# Patient Record
Sex: FEMALE | Race: WHITE | NOT HISPANIC OR LATINO | Employment: PART TIME
[De-identification: names, ages, dates, MRNs, and addresses within clinical notes are randomized per-mention and may not be internally consistent; named-entity substitution may affect disease eponyms.]

---

## 2018-03-23 ENCOUNTER — TRANSCRIBE ORDERS (OUTPATIENT)
Dept: SCHEDULING | Age: 68
End: 2018-03-23

## 2018-03-23 ENCOUNTER — TRANSCRIBE ORDERS (OUTPATIENT)
Dept: LAB | Age: 68
End: 2018-03-23

## 2018-03-23 ENCOUNTER — APPOINTMENT (OUTPATIENT)
Dept: LAB | Age: 68
End: 2018-03-23
Attending: INTERNAL MEDICINE
Payer: MEDICARE

## 2018-03-23 DIAGNOSIS — Z11.59 SCREENING EXAMINATION FOR POLIOMYELITIS: ICD-10-CM

## 2018-03-23 DIAGNOSIS — E78.00 PURE HYPERCHOLESTEROLEMIA: Primary | ICD-10-CM

## 2018-03-23 DIAGNOSIS — E78.00 PURE HYPERCHOLESTEROLEMIA: ICD-10-CM

## 2018-03-23 DIAGNOSIS — Z12.39 SCREENING BREAST EXAMINATION: Primary | ICD-10-CM

## 2018-03-23 LAB
CHOLEST SERPL-MCNC: 203 MG/DL
HDLC SERPL-MCNC: 55 MG/DL
HDLC SERPL: 3.7 {RATIO}
LDLC SERPL CALC-MCNC: 130 MG/DL
NONHDLC SERPL-MCNC: 148 MG/DL
TRIGL SERPL-MCNC: 91 MG/DL (ref 30–149)

## 2018-03-23 PROCEDURE — 36415 COLL VENOUS BLD VENIPUNCTURE: CPT

## 2018-03-23 PROCEDURE — 80061 LIPID PANEL: CPT

## 2018-03-23 PROCEDURE — 86803 HEPATITIS C AB TEST: CPT

## 2018-03-24 LAB — HCV AB SER QL: NONREACTIVE S/CO

## 2018-04-23 ENCOUNTER — HOSPITAL ENCOUNTER (OUTPATIENT)
Dept: RADIOLOGY | Age: 68
Discharge: HOME | End: 2018-04-23
Attending: OBSTETRICS & GYNECOLOGY
Payer: MEDICARE

## 2018-04-23 DIAGNOSIS — Z12.39 SCREENING BREAST EXAMINATION: ICD-10-CM

## 2018-04-23 PROCEDURE — 77063 BREAST TOMOSYNTHESIS BI: CPT

## 2018-05-09 ENCOUNTER — TRANSCRIBE ORDERS (OUTPATIENT)
Dept: SCHEDULING | Age: 68
End: 2018-05-09

## 2018-05-09 DIAGNOSIS — M85.9 DISORDER OF BONE DENSITY AND STRUCTURE, UNSPECIFIED: ICD-10-CM

## 2018-05-09 DIAGNOSIS — Z87.39 PERSONAL HISTORY OF ARTHRITIS: Primary | ICD-10-CM

## 2018-05-15 ENCOUNTER — HOSPITAL ENCOUNTER (OUTPATIENT)
Dept: RADIOLOGY | Age: 68
Discharge: HOME | End: 2018-05-15
Attending: OBSTETRICS & GYNECOLOGY
Payer: MEDICARE

## 2018-05-15 DIAGNOSIS — Z87.39 PERSONAL HISTORY OF ARTHRITIS: ICD-10-CM

## 2018-05-15 DIAGNOSIS — M85.9 DISORDER OF BONE DENSITY AND STRUCTURE, UNSPECIFIED: ICD-10-CM

## 2018-05-15 PROCEDURE — 77080 DXA BONE DENSITY AXIAL: CPT

## 2019-04-18 ENCOUNTER — TRANSCRIBE ORDERS (OUTPATIENT)
Dept: LAB | Age: 69
End: 2019-04-18

## 2019-04-18 ENCOUNTER — APPOINTMENT (OUTPATIENT)
Dept: LAB | Age: 69
End: 2019-04-18
Attending: INTERNAL MEDICINE
Payer: MEDICARE

## 2019-04-18 DIAGNOSIS — E78.00 PURE HYPERCHOLESTEROLEMIA: Primary | ICD-10-CM

## 2019-04-18 DIAGNOSIS — E78.00 PURE HYPERCHOLESTEROLEMIA: ICD-10-CM

## 2019-04-18 LAB
CHOLEST SERPL-MCNC: 165 MG/DL
HDLC SERPL-MCNC: 50 MG/DL
HDLC SERPL: 3.3 {RATIO}
LDLC SERPL CALC-MCNC: 100 MG/DL
NONHDLC SERPL-MCNC: 115 MG/DL
TRIGL SERPL-MCNC: 74 MG/DL (ref 30–149)

## 2019-04-18 PROCEDURE — 36415 COLL VENOUS BLD VENIPUNCTURE: CPT

## 2019-04-18 PROCEDURE — 80061 LIPID PANEL: CPT

## 2019-04-20 ENCOUNTER — TRANSCRIBE ORDERS (OUTPATIENT)
Dept: SCHEDULING | Age: 69
End: 2019-04-20

## 2019-04-20 DIAGNOSIS — Z12.31 ENCOUNTER FOR SCREENING MAMMOGRAM FOR MALIGNANT NEOPLASM OF BREAST: Primary | ICD-10-CM

## 2019-04-30 ENCOUNTER — HOSPITAL ENCOUNTER (OUTPATIENT)
Dept: RADIOLOGY | Age: 69
Discharge: HOME | End: 2019-04-30
Attending: OBSTETRICS & GYNECOLOGY
Payer: MEDICARE

## 2019-04-30 DIAGNOSIS — Z12.31 ENCOUNTER FOR SCREENING MAMMOGRAM FOR MALIGNANT NEOPLASM OF BREAST: ICD-10-CM

## 2019-04-30 PROCEDURE — 77063 BREAST TOMOSYNTHESIS BI: CPT

## 2020-01-09 ENCOUNTER — TRANSCRIBE ORDERS (OUTPATIENT)
Dept: LAB | Age: 70
End: 2020-01-09

## 2020-01-09 ENCOUNTER — APPOINTMENT (OUTPATIENT)
Dept: LAB | Age: 70
End: 2020-01-09
Attending: INTERNAL MEDICINE
Payer: MEDICARE

## 2020-01-09 DIAGNOSIS — E78.00 PURE HYPERCHOLESTEROLEMIA, UNSPECIFIED: ICD-10-CM

## 2020-01-09 DIAGNOSIS — E78.00 PURE HYPERCHOLESTEROLEMIA, UNSPECIFIED: Primary | ICD-10-CM

## 2020-01-09 LAB
ALT SERPL-CCNC: 27 IU/L (ref 11–54)
AST SERPL-CCNC: 27 IU/L (ref 15–41)
CHOLEST SERPL-MCNC: 176 MG/DL
CK SERPL-CCNC: 72 U/L (ref 15–200)
HDLC SERPL-MCNC: 63 MG/DL
HDLC SERPL: 2.8 {RATIO}
LDLC SERPL CALC-MCNC: 97 MG/DL
NONHDLC SERPL-MCNC: 113 MG/DL
TRIGL SERPL-MCNC: 82 MG/DL (ref 30–149)

## 2020-01-09 PROCEDURE — 36415 COLL VENOUS BLD VENIPUNCTURE: CPT

## 2020-01-09 PROCEDURE — 84450 TRANSFERASE (AST) (SGOT): CPT

## 2020-01-09 PROCEDURE — 84460 ALANINE AMINO (ALT) (SGPT): CPT

## 2020-01-09 PROCEDURE — 82550 ASSAY OF CK (CPK): CPT

## 2020-01-09 PROCEDURE — 80061 LIPID PANEL: CPT

## 2020-02-12 ENCOUNTER — OFFICE VISIT (OUTPATIENT)
Dept: OTOLARYNGOLOGY | Facility: CLINIC | Age: 70
End: 2020-02-12
Payer: MEDICARE

## 2020-02-12 VITALS
DIASTOLIC BLOOD PRESSURE: 70 MMHG | OXYGEN SATURATION: 96 % | HEART RATE: 64 BPM | HEIGHT: 63 IN | TEMPERATURE: 98.2 F | SYSTOLIC BLOOD PRESSURE: 130 MMHG | WEIGHT: 220 LBS | BODY MASS INDEX: 38.98 KG/M2

## 2020-02-12 DIAGNOSIS — H61.23 BILATERAL IMPACTED CERUMEN: Primary | ICD-10-CM

## 2020-02-12 PROCEDURE — 69210 REMOVE IMPACTED EAR WAX UNI: CPT | Performed by: OTOLARYNGOLOGY

## 2020-02-12 PROCEDURE — 99999 PR OFFICE/OUTPT VISIT,PROCEDURE ONLY: CPT | Performed by: OTOLARYNGOLOGY

## 2020-02-12 RX ORDER — MULTIVIT-MIN/IRON/FA/VIT K/LUT 8MG-400MCG
1 TABLET ORAL DAILY
COMMUNITY

## 2020-02-12 RX ORDER — METHYLPREDNISOLONE 4 MG
1 TABLET, DOSE PACK ORAL DAILY
COMMUNITY

## 2020-02-12 RX ORDER — ROSUVASTATIN CALCIUM 5 MG/1
5 TABLET, COATED ORAL DAILY
COMMUNITY

## 2020-02-12 RX ORDER — CHOLECALCIFEROL (VITAMIN D3) 1250 MCG
3000 TABLET ORAL DAILY
COMMUNITY

## 2020-02-12 NOTE — LETTER
February 12, 2020     Don Rand MD  [address] [facility]  [address]  [place] [address]    Patient: Em Briseno  YOB: 1950  Date of Visit: 2/12/2020      Dear Dr. Rand:    Thank you for referring Em Briseno to me for evaluation. Below are my notes for this consultation.    If you have questions, please do not hesitate to call me. I look forward to following your patient along with you.         Sincerely,        Charley Epps MD        CC: MD Mich Hall, Charley NAJERA MD  2/12/2020  1:03 PM  Signed  Procedures   Cerumen Removal-   Both ears were examined under the otomicroscope and noted to have cerumen impaction obstruting adequate visualization of the tympanic membrane.  Using a combination of suction, curettes, alligator and micro instruments, the cerumen was successfully extracted.  At the conclusion of the procedure the EAC was noted to be healthy and intact without evidence of bleeding or trama. The tympanic membrane appeared intact, with normal landmarks and no evidence of effusion    In the left ear there was some mild wet wax no evidence of acute otitis externa the patient did report some pain in palpation of the tragus a few days ago that has since resolved    I discussed with the patient that if she starts to feel more pain in the outer ear or tragus that she should call our office and I will call in Ciprodex eardrops for now I recommend dry ear precautions to prevent infection.

## 2020-08-03 ENCOUNTER — TRANSCRIBE ORDERS (OUTPATIENT)
Dept: SCHEDULING | Age: 70
End: 2020-08-03

## 2020-08-03 DIAGNOSIS — Z13.820 ENCOUNTER FOR SCREENING FOR OSTEOPOROSIS: ICD-10-CM

## 2020-08-03 DIAGNOSIS — Z12.31 ENCOUNTER FOR SCREENING MAMMOGRAM FOR MALIGNANT NEOPLASM OF BREAST: Primary | ICD-10-CM

## 2020-08-03 DIAGNOSIS — M85.80 OTHER SPECIFIED DISORDERS OF BONE DENSITY AND STRUCTURE, UNSPECIFIED SITE: ICD-10-CM

## 2020-08-19 ENCOUNTER — HOSPITAL ENCOUNTER (OUTPATIENT)
Dept: RADIOLOGY | Age: 70
Discharge: HOME | End: 2020-08-19
Attending: OBSTETRICS & GYNECOLOGY
Payer: MEDICARE

## 2020-08-19 ENCOUNTER — TRANSCRIBE ORDERS (OUTPATIENT)
Dept: RADIOLOGY | Age: 70
End: 2020-08-19

## 2020-08-19 DIAGNOSIS — Z12.31 ENCOUNTER FOR SCREENING MAMMOGRAM FOR MALIGNANT NEOPLASM OF BREAST: ICD-10-CM

## 2020-08-19 DIAGNOSIS — M85.89 OTHER SPECIFIED DISORDERS OF BONE DENSITY AND STRUCTURE, MULTIPLE SITES: ICD-10-CM

## 2020-08-19 DIAGNOSIS — M85.80 OTHER SPECIFIED DISORDERS OF BONE DENSITY AND STRUCTURE, UNSPECIFIED SITE: ICD-10-CM

## 2020-08-19 DIAGNOSIS — M85.80 OTHER SPECIFIED DISORDERS OF BONE DENSITY AND STRUCTURE, UNSPECIFIED SITE: Primary | ICD-10-CM

## 2020-08-19 DIAGNOSIS — M85.89 OTHER SPECIFIED DISORDERS OF BONE DENSITY AND STRUCTURE, MULTIPLE SITES: Primary | ICD-10-CM

## 2020-08-19 PROCEDURE — 77063 BREAST TOMOSYNTHESIS BI: CPT

## 2020-08-19 PROCEDURE — 77080 DXA BONE DENSITY AXIAL: CPT

## 2020-08-24 ENCOUNTER — TRANSCRIBE ORDERS (OUTPATIENT)
Dept: SCHEDULING | Age: 70
End: 2020-08-24

## 2020-08-24 DIAGNOSIS — N20.0 CALCULUS OF KIDNEY: Primary | ICD-10-CM

## 2020-09-08 ENCOUNTER — HOSPITAL ENCOUNTER (OUTPATIENT)
Dept: RADIOLOGY | Age: 70
Discharge: HOME | End: 2020-09-08
Attending: UROLOGY
Payer: MEDICARE

## 2020-09-08 DIAGNOSIS — N20.0 CALCULUS OF KIDNEY: ICD-10-CM

## 2020-09-08 PROCEDURE — 76775 US EXAM ABDO BACK WALL LIM: CPT

## 2020-09-08 PROCEDURE — 74018 RADEX ABDOMEN 1 VIEW: CPT

## 2021-03-31 ENCOUNTER — TRANSCRIBE ORDERS (OUTPATIENT)
Dept: LAB | Age: 71
End: 2021-03-31

## 2021-03-31 ENCOUNTER — APPOINTMENT (OUTPATIENT)
Dept: LAB | Age: 71
End: 2021-03-31
Attending: INTERNAL MEDICINE
Payer: MEDICARE

## 2021-03-31 DIAGNOSIS — E78.00 PURE HYPERCHOLESTEROLEMIA, UNSPECIFIED: Primary | ICD-10-CM

## 2021-03-31 DIAGNOSIS — E78.00 PURE HYPERCHOLESTEROLEMIA, UNSPECIFIED: ICD-10-CM

## 2021-03-31 LAB
ALT SERPL-CCNC: 19 IU/L (ref 11–54)
AST SERPL-CCNC: 22 IU/L (ref 15–41)
CHOLEST SERPL-MCNC: 142 MG/DL
CK SERPL-CCNC: 93 U/L (ref 15–200)
HDLC SERPL-MCNC: 58 MG/DL
HDLC SERPL: 2.4 {RATIO}
LDLC SERPL CALC-MCNC: 73 MG/DL
NONHDLC SERPL-MCNC: 84 MG/DL
TRIGL SERPL-MCNC: 56 MG/DL (ref 30–149)

## 2021-03-31 PROCEDURE — 80061 LIPID PANEL: CPT

## 2021-03-31 PROCEDURE — 84460 ALANINE AMINO (ALT) (SGPT): CPT

## 2021-03-31 PROCEDURE — 36415 COLL VENOUS BLD VENIPUNCTURE: CPT

## 2021-03-31 PROCEDURE — 84450 TRANSFERASE (AST) (SGOT): CPT

## 2021-03-31 PROCEDURE — 82550 ASSAY OF CK (CPK): CPT

## 2021-04-15 DIAGNOSIS — Z23 ENCOUNTER FOR IMMUNIZATION: ICD-10-CM

## 2021-08-04 ENCOUNTER — OFFICE VISIT (OUTPATIENT)
Dept: SURGERY | Facility: CLINIC | Age: 71
End: 2021-08-04
Payer: MEDICARE

## 2021-08-04 VITALS — HEIGHT: 63 IN | WEIGHT: 208 LBS | BODY MASS INDEX: 36.86 KG/M2

## 2021-08-04 DIAGNOSIS — K64.8 INTERNAL HEMORRHOID, BLEEDING: Primary | ICD-10-CM

## 2021-08-04 PROCEDURE — 99204 OFFICE O/P NEW MOD 45 MIN: CPT | Performed by: SURGERY

## 2021-08-04 NOTE — PROGRESS NOTES
Chief Complaint:   Chief Complaint   Patient presents with   • Rectal Bleeding       HPI     Patient is a 70 y.o. female who presents with the following:  Pt is up to date with colonoscopies - few times per year has tinge of blood on TP - size of nickel maybe - maybe 2-3 x / year -     Pt got hems when daughter was born - daughter is 40 -     Depends on what she eats - pt has IBS for years - watch out for broccoli and corn - bowels have been irregular in last few months    No pain with BM  Last scope was 2017  No itch    Bleeds more with with wipes -     Had stains on underwear - had to wear pads -     Had stains a month ago -      Medical History:   Past Medical History:   Diagnosis Date   • Lipid disorder        Surgical History:   Past Surgical History:   Procedure Laterality Date   • HERNIA REPAIR     • JOINT REPLACEMENT     • KIDNEY SURGERY      stones   • KNEE ARTHROSCOPY Bilateral    • OOPHORECTOMY         Social History:   Social History     Socioeconomic History   • Marital status:      Spouse name: Not on file   • Number of children: Not on file   • Years of education: Not on file   • Highest education level: Not on file   Occupational History   • Not on file   Tobacco Use   • Smoking status: Never Smoker   • Smokeless tobacco: Never Used   Vaping Use   • Vaping Use: Never used   Substance and Sexual Activity   • Alcohol use: Yes   • Drug use: Never   • Sexual activity: Defer   Other Topics Concern   • Not on file   Social History Narrative   • Not on file     Social Determinants of Health     Financial Resource Strain:    • Difficulty of Paying Living Expenses:    Food Insecurity:    • Worried About Running Out of Food in the Last Year:    • Ran Out of Food in the Last Year:    Transportation Needs:    • Lack of Transportation (Medical):    • Lack of Transportation (Non-Medical):    Physical Activity:    • Days of Exercise per Week:    • Minutes of Exercise per Session:    Stress:    • Feeling  of Stress :    Social Connections:    • Frequency of Communication with Friends and Family:    • Frequency of Social Gatherings with Friends and Family:    • Attends Hoah[employer] Services:    • Active Member of Clubs or Organizations:    • Attends Club or Organization Meetings:    • Marital Status:    Intimate Partner Violence:    • Fear of Current or Ex-Partner:    • Emotionally Abused:    • Physically Abused:    • Sexually Abused:        Family History:   History reviewed. No pertinent family history.    Allergies:   Sulfa (sulfonamide antibiotics), Epinephrine, Phenylephrine, Pseudoephedrine, and Triprolidine    Current Medications:      Current Outpatient Medications:   •  calc-D3-magnes-Q8-Yn-Gx-park 250 mg-400 unit -40 mg-5 mg tablet, Take by mouth., Disp: , Rfl:   •  cholecalciferol (VITAMIN D3) 50,000 unit tablet, Take by mouth., Disp: , Rfl:   •  glucosamine sulfate (GLUCOSAMINE) 500 mg tablet, Take by mouth., Disp: , Rfl:   •  multivitamin with minerals tablet, Take by mouth., Disp: , Rfl:   •  rosuvastatin (CRESTOR) 5 mg tablet, Take 5 mg by mouth daily., Disp: , Rfl:   •  vitB6/mag cit,ox/potassium cit (THERALITH XR ORAL), Take by mouth 4 (four) times a day., Disp: , Rfl:     Review of Systems  All 14 systems reviewed and the findings are not pertinent to the current problem.    Objective     Vital Signs  There were no vitals filed for this visit.  Body mass index is 36.85 kg/m².      Physicial Exam  General Appearance:  Well developed.  Well nourished.  In no acute distress.    Anorectal Examination:    No pilonidal sinus was observed.   No pilonidal cyst was observed.   Perianal skin was not erythematous.  No perianal wart was observed.  No anal fissure was observed.   Anus did not have a fistula.   Anal sphincter tone was normal.    The patient had moderate internal hems - not bleeding    No external anal skin tags were observed.   Anus had no mass.  Rectum:  No rectal abscess was observed.   Rectal  prolapse was not observed.   No rectal fistula was observed.   Rectal exam was not tender.   No rectal fluctuance was observed.  Rectal exam showed no mass.   Normal preet-anal skin with no lesions or dermatitis      Problem List Items Addressed This Visit     Internal hemorrhoid, bleeding - Primary     This nice lady has rectal bleeding that amounts to few dots on the paper every other month.  She has no pain or any other symptoms.  On examination she has moderate internal hemorrhoids that are not actively bleeding.  I reassured her that everything is basically normal and that she does not need any treatment for this.  Her last colonoscopy was 2017.  If her bleeding increases significantly then I will offer her injection sclerotherapy for her moderate internal hemorrhoids                   Pan Mcclellan MD 8/4/2021 9:38 AM

## 2021-08-04 NOTE — PATIENT INSTRUCTIONS
Injection Sclerotherapy for Hemorrhoids    Treatment Overview    Injection sclerotherapy is a medical procedure used to treat bulging or bleeding internal hemorrhoids. This fixative procedure uses a chemical that scars the tissues and cuts off the hemorrhoids' blood supply.    The doctor injects a chemical called “phenol” mixed in olive oil into the veins within a hemorrhoid. This chemical causes the vein to harden and the hemorrhoid tissue to die. A scar forms in place of the hemorrhoid on the wall of the anal canal. The scar tissue, which is firm and thick, holds nearby tissue and veins in place so they don't bulge into the anal canal.    What To Expect After Treatment    ? a small to moderate amount of bleeding may occur for up to 7 days  ? you may feel an ache or soreness for 24-48 hours  ? you may feel full or like you have to defecate for 24 hours    Taking Care of Yourself    ? sitting in a warm tub or sitz bath may relieve the aching sensation  ? tylenol or ibuprofen should be adequate to control the discomfort  ? try to maintain a high fiber diet in order to reduce straining with defecation    Adverse Reactions - please call Dr. Mcclellan if any of these occur    ? fever  ? chills  ? aching joints  ? pelvic pain  ? pelvic inflammation  ? rectal inflammation or infection      These reactions are rare and may occur in 1 in 500 cases.  Most are not serious, are self-limiting, and require no medical intervention.

## 2021-08-04 NOTE — ASSESSMENT & PLAN NOTE
This nice lady has rectal bleeding that amounts to few dots on the paper every other month.  She has no pain or any other symptoms.  On examination she has moderate internal hemorrhoids that are not actively bleeding.  I reassured her that everything is basically normal and that she does not need any treatment for this.  Her last colonoscopy was 2017.  If her bleeding increases significantly then I will offer her injection sclerotherapy for her moderate internal hemorrhoids

## 2021-08-04 NOTE — LETTER
August 4, 2021     Don Rand MD  [address] [place], [place] [address]  [address]  [place] [address]    Patient: Em Briseno  YOB: 1950  Date of Visit: 8/4/2021      Dear Dr. Rand:    Thank you for referring Em Briseno to me for evaluation. Below are my notes for this consultation.    If you have questions, please do not hesitate to call me. I look forward to following your patient along with you.         Sincerely,        Pan Mcclellan MD        CC: No Recipients  Pan Mcclellan MD  8/4/2021  9:38 AM  Sign when Signing Visit      Chief Complaint:   Chief Complaint   Patient presents with   • Rectal Bleeding       HPI     Patient is a 70 y.o. female who presents with the following:  Pt is up to date with colonoscopies - few times per year has tinge of blood on TP - size of nickel maybe - maybe 2-3 x / year -     Pt got hems when daughter was born - daughter is 40 -     Depends on what she eats - pt has IBS for years - watch out for broccoli and corn - bowels have been irregular in last few months    No pain with BM  Last scope was 2017  No itch    Bleeds more with with wipes -     Had stains on underwear - had to wear pads -     Had stains a month ago -      Medical History:   Past Medical History:   Diagnosis Date   • Lipid disorder        Surgical History:   Past Surgical History:   Procedure Laterality Date   • HERNIA REPAIR     • JOINT REPLACEMENT     • KIDNEY SURGERY      stones   • KNEE ARTHROSCOPY Bilateral    • OOPHORECTOMY         Social History:   Social History     Socioeconomic History   • Marital status:      Spouse name: Not on file   • Number of children: Not on file   • Years of education: Not on file   • Highest education level: Not on file   Occupational History   • Not on file   Tobacco Use   • Smoking status: Never Smoker   • Smokeless tobacco: Never Used   Vaping Use   • Vaping Use: Never used   Substance and Sexual Activity   • Alcohol use: Yes   • Drug use:  Never   • Sexual activity: Defer   Other Topics Concern   • Not on file   Social History Narrative   • Not on file     Social Determinants of Health     Financial Resource Strain:    • Difficulty of Paying Living Expenses:    Food Insecurity:    • Worried About Running Out of Food in the Last Year:    • Ran Out of Food in the Last Year:    Transportation Needs:    • Lack of Transportation (Medical):    • Lack of Transportation (Non-Medical):    Physical Activity:    • Days of Exercise per Week:    • Minutes of Exercise per Session:    Stress:    • Feeling of Stress :    Social Connections:    • Frequency of Communication with Friends and Family:    • Frequency of Social Gatherings with Friends and Family:    • Attends Worship Services:    • Active Member of Clubs or Organizations:    • Attends Club or Organization Meetings:    • Marital Status:    Intimate Partner Violence:    • Fear of Current or Ex-Partner:    • Emotionally Abused:    • Physically Abused:    • Sexually Abused:        Family History:   History reviewed. No pertinent family history.    Allergies:   Sulfa (sulfonamide antibiotics), Epinephrine, Phenylephrine, Pseudoephedrine, and Triprolidine    Current Medications:      Current Outpatient Medications:   •  calc-D3-magnes-M5-Al-Pu-park 250 mg-400 unit -40 mg-5 mg tablet, Take by mouth., Disp: , Rfl:   •  cholecalciferol (VITAMIN D3) 50,000 unit tablet, Take by mouth., Disp: , Rfl:   •  glucosamine sulfate (GLUCOSAMINE) 500 mg tablet, Take by mouth., Disp: , Rfl:   •  multivitamin with minerals tablet, Take by mouth., Disp: , Rfl:   •  rosuvastatin (CRESTOR) 5 mg tablet, Take 5 mg by mouth daily., Disp: , Rfl:   •  vitB6/mag cit,ox/potassium cit (THERALITH XR ORAL), Take by mouth 4 (four) times a day., Disp: , Rfl:     Review of Systems  All 14 systems reviewed and the findings are not pertinent to the current problem.    Objective     Vital Signs  There were no vitals filed for this visit.  Body  mass index is 36.85 kg/m².      Physicial Exam  General Appearance:  Well developed.  Well nourished.  In no acute distress.    Anorectal Examination:    No pilonidal sinus was observed.   No pilonidal cyst was observed.   Perianal skin was not erythematous.  No perianal wart was observed.  No anal fissure was observed.   Anus did not have a fistula.   Anal sphincter tone was normal.    The patient had moderate internal hems - not bleeding    No external anal skin tags were observed.   Anus had no mass.  Rectum:  No rectal abscess was observed.   Rectal prolapse was not observed.   No rectal fistula was observed.   Rectal exam was not tender.   No rectal fluctuance was observed.  Rectal exam showed no mass.   Normal preet-anal skin with no lesions or dermatitis      Problem List Items Addressed This Visit     Internal hemorrhoid, bleeding - Primary     This nice lady has rectal bleeding that amounts to few dots on the paper every other month.  She has no pain or any other symptoms.  On examination she has moderate internal hemorrhoids that are not actively bleeding.  I reassured her that everything is basically normal and that she does not need any treatment for this.  Her last colonoscopy was 2017.  If her bleeding increases significantly then I will offer her injection sclerotherapy for her moderate internal hemorrhoids                   Pan Mcclellan MD 8/4/2021 9:38 AM

## 2021-09-01 ENCOUNTER — TRANSCRIBE ORDERS (OUTPATIENT)
Dept: SCHEDULING | Age: 71
End: 2021-09-01

## 2021-09-01 DIAGNOSIS — Z12.31 ENCOUNTER FOR SCREENING MAMMOGRAM FOR MALIGNANT NEOPLASM OF BREAST: Primary | ICD-10-CM

## 2021-09-14 ENCOUNTER — TELEPHONE (OUTPATIENT)
Dept: SURGERY | Facility: CLINIC | Age: 71
End: 2021-09-14

## 2021-09-16 ENCOUNTER — HOSPITAL ENCOUNTER (OUTPATIENT)
Dept: RADIOLOGY | Age: 71
Discharge: HOME | End: 2021-09-16
Attending: OBSTETRICS & GYNECOLOGY
Payer: MEDICARE

## 2021-09-16 DIAGNOSIS — Z12.31 ENCOUNTER FOR SCREENING MAMMOGRAM FOR MALIGNANT NEOPLASM OF BREAST: ICD-10-CM

## 2021-09-16 PROCEDURE — 77063 BREAST TOMOSYNTHESIS BI: CPT

## 2021-10-08 ENCOUNTER — IMMUNIZATION (OUTPATIENT)
Dept: IMMUNIZATION | Facility: CLINIC | Age: 71
End: 2021-10-08
Payer: MEDICARE

## 2021-10-08 PROCEDURE — 0004A COVID-19 (SARS-COV-2) VACCINE, PFIZER: CPT | Performed by: FAMILY MEDICINE

## 2021-10-08 PROCEDURE — 91300 COVID-19 (SARS-COV-2) VACCINE, PFIZER: CPT | Performed by: FAMILY MEDICINE

## 2022-01-12 ENCOUNTER — TRANSCRIBE ORDERS (OUTPATIENT)
Dept: SCHEDULING | Age: 72
End: 2022-01-12

## 2022-01-12 DIAGNOSIS — N95.0 POSTMENOPAUSAL BLEEDING: Primary | ICD-10-CM

## 2022-01-26 ENCOUNTER — HOSPITAL ENCOUNTER (OUTPATIENT)
Dept: RADIOLOGY | Age: 72
Discharge: HOME | End: 2022-01-26
Attending: OBSTETRICS & GYNECOLOGY
Payer: MEDICARE

## 2022-01-26 DIAGNOSIS — N95.0 POSTMENOPAUSAL BLEEDING: ICD-10-CM

## 2022-01-26 PROCEDURE — 76856 US EXAM PELVIC COMPLETE: CPT

## 2022-01-28 PROBLEM — R93.89 THICKENED ENDOMETRIUM: Status: ACTIVE | Noted: 2022-01-28

## 2022-01-28 PROBLEM — N95.0 PMB (POSTMENOPAUSAL BLEEDING): Status: ACTIVE | Noted: 2022-01-28

## 2022-01-31 ENCOUNTER — TELEPHONE (OUTPATIENT)
Dept: GYNECOLOGIC ONCOLOGY | Facility: CLINIC | Age: 72
End: 2022-01-31
Payer: MEDICARE

## 2022-02-03 ENCOUNTER — OFFICE VISIT (OUTPATIENT)
Dept: GYNECOLOGIC ONCOLOGY | Facility: CLINIC | Age: 72
End: 2022-02-03
Attending: OBSTETRICS & GYNECOLOGY
Payer: MEDICARE

## 2022-02-03 VITALS
WEIGHT: 215 LBS | DIASTOLIC BLOOD PRESSURE: 78 MMHG | HEART RATE: 81 BPM | SYSTOLIC BLOOD PRESSURE: 137 MMHG | OXYGEN SATURATION: 98 % | HEIGHT: 63 IN | BODY MASS INDEX: 38.09 KG/M2

## 2022-02-03 DIAGNOSIS — R93.89 THICKENED ENDOMETRIUM: Primary | ICD-10-CM

## 2022-02-03 DIAGNOSIS — N95.0 PMB (POSTMENOPAUSAL BLEEDING): ICD-10-CM

## 2022-02-03 PROCEDURE — 99203 OFFICE O/P NEW LOW 30 MIN: CPT | Performed by: OBSTETRICS & GYNECOLOGY

## 2022-02-03 RX ORDER — ASCORBIC ACID 500 MG
1 TABLET,CHEWABLE ORAL DAILY
COMMUNITY
Start: 2020-05-01

## 2022-02-03 RX ORDER — MISOPROSTOL 200 UG/1
TABLET ORAL
COMMUNITY
Start: 2022-01-26 | End: 2022-02-15 | Stop reason: HOSPADM

## 2022-02-03 RX ORDER — MISOPROSTOL 200 UG/1
200 TABLET ORAL SEE ADMIN INSTRUCTIONS
Qty: 2 TABLET | Refills: 0 | Status: SHIPPED | OUTPATIENT
Start: 2022-02-03 | End: 2022-02-15 | Stop reason: HOSPADM

## 2022-02-03 NOTE — LETTER
February 14, 2022    Patient: Em Briseno   YOB: 1950   Date of Visit: 2/3/2022       Dear Dr. Rand:    The patient is seen back at the [facility] today in follow up with regard to her   1. Thickened endometrium    2. PMB (postmenopausal bleeding)    . Below is my assessment and plan of care.       Georgina, who had a laparoscopic BSO through our office in 2012, returned with an ultrasound showing a thickened endometrium and 2 episodes of postmenopausal bleeding.    I offered her a D&C hysteroscopy either with Dr. Miller or myself.  This could be done in the office or in the operating room.  I do not think a blind endometrial biopsy is appropriate in a patient with persistent bleeding and a thickened endometrium.    She is asked me to do the procedure given her familiarity with the office.  I reassured her that Dr. Miller does this procedure on a routine basis but she was insistent.    I will keep you both aware of her pathology.  Please not hesitate to call me if I can be of further assistance         Sincerely,        Adryan Leyva MD  CC: Oksana Sierra MD

## 2022-02-03 NOTE — PATIENT INSTRUCTIONS
Tylenol 650-1000 mg  Ibuprofen 400mg    Take both 1 hour prior to coming to the office    Cytotec 400mg 12 hours prior to coming to the office

## 2022-02-03 NOTE — H&P (VIEW-ONLY)
Em Briseno presents to the office for  second opinion.  Episode of spotty vaginal bleeding in November.  Several days ago, another episode of spotty vaginal bleeding.  She was seen by Dr. Sierra who noticed thinning of the vaginal epithelium most likely due to her prolapse.  Pelvic ultrasound showed a 6 mm endometrial stripe    Past Medical History:   Diagnosis Date   • Lipid disorder      Past Surgical History:   Procedure Laterality Date   • HERNIA REPAIR     • JOINT REPLACEMENT     • KIDNEY SURGERY      stones   • KNEE ARTHROSCOPY Bilateral    • OOPHORECTOMY         Current Outpatient Medications:   •  ascorbic acid, vitamin C, (VITAMIN C) 500 mg tablet,chewable, , Disp: , Rfl:   •  calc-D3-magnes-I8-Mt-Zq-park 250 mg-400 unit -40 mg-5 mg tablet, Take by mouth., Disp: , Rfl:   •  cholecalciferol (VITAMIN D3) 50,000 unit tablet, Take by mouth., Disp: , Rfl:   •  glucosamine sulfate (GLUCOSAMINE) 500 mg tablet, Take by mouth., Disp: , Rfl:   •  multivitamin with minerals tablet, Take by mouth., Disp: , Rfl:   •  rosuvastatin (CRESTOR) 5 mg tablet, Take 5 mg by mouth daily., Disp: , Rfl:   •  vitB6/mag cit,ox/potassium cit (THERALITH XR ORAL), Take by mouth 4 (four) times a day., Disp: , Rfl:   •  miSOPROStoL (CYTOTEC) 200 mcg tablet, , Disp: , Rfl:   •  miSOPROStoL (CYTOTEC) 200 mcg tablet, Take 1 tablet (200 mcg total) by mouth See admin instr. 2 tablets orally 12 hours prior to procedure, Disp: 2 tablet, Rfl: 0  Sulfa (sulfonamide antibiotics), Epinephrine, Phenylephrine, Pseudoephedrine, and Triprolidine  Cancer-related family history includes Pancreatic cancer in her biological brother.   reports that she has never smoked. She has never used smokeless tobacco. She reports current alcohol use. She reports that she does not use drugs.  ROS: Negative in all systems with the exception of the above    Physical examination: Well-developed female in no apparent distress  Vitals: BP: 137/78  Heart Rate:  "81  SpO2: 98 %  Height: 160 cm (5' 3\")  Weight: 97.5 kg (215 lb) (02/03 1259)  Body mass index is 38.09 kg/m².  HEENT: Normocephalic, atraumatic, nonicteric sclera  Neck: Supple no masses, thyroid normal nontender  Lymphatic: No inguinal or supraclavicular adenopathy  Heart: Regular rate no murmurs  Lungs: Clear to auscultation with good respiratory effort  Extremities: No clubbing, cyanosis, edema  Neuro: Oriented ×3 with normal mood and affect  Abdomen: Soft, nontender, nondistended. No hepatosplenomegaly. No rebound or guarding.      Assessment/Plan   Assesment:  Problem List Items Addressed This Visit        Genitourinary    PMB (postmenopausal bleeding)    Current Assessment & Plan     Patient should have a D&C hysteroscopy, either by Dr. Miller or myself, either in the office or in the operating room.  A blind endometrial biopsy in the face of persistent bleeding and a thickened endometrial stripe would be inappropriate.  She needs directed biopsies.    Georgina has requested an office procedure in our office since she is had surgery with us in the past.  Consent was signed and we will schedule for an office based D&C hysteroscopy         Relevant Medications    miSOPROStoL (CYTOTEC) 200 mcg tablet    Thickened endometrium - Primary    Overview     Spotty VB starting in November.  1/26/22 US Uterus measures 9.3 x 2.8 x 4.1 cm on transvaginal imaging.  Endometrial stripe complex measures 6 mm in thickness.  No focal mass or abnormal vascularity.         Relevant Medications    miSOPROStoL (CYTOTEC) 200 mcg tablet           "

## 2022-02-03 NOTE — ASSESSMENT & PLAN NOTE
Patient should have a D&C hysteroscopy, either by Dr. Miller or myself, either in the office or in the operating room.  A blind endometrial biopsy in the face of persistent bleeding and a thickened endometrial stripe would be inappropriate.  She needs directed biopsies.    Georgina has requested an office procedure in our office since she is had surgery with us in the past.  Consent was signed and we will schedule for an office based D&C hysteroscopy

## 2022-02-03 NOTE — PROGRESS NOTES
Em Briseno presents to the office for  second opinion.  Episode of spotty vaginal bleeding in November.  Several days ago, another episode of spotty vaginal bleeding.  She was seen by Dr. Sierra who noticed thinning of the vaginal epithelium most likely due to her prolapse.  Pelvic ultrasound showed a 6 mm endometrial stripe    Past Medical History:   Diagnosis Date   • Lipid disorder      Past Surgical History:   Procedure Laterality Date   • HERNIA REPAIR     • JOINT REPLACEMENT     • KIDNEY SURGERY      stones   • KNEE ARTHROSCOPY Bilateral    • OOPHORECTOMY         Current Outpatient Medications:   •  ascorbic acid, vitamin C, (VITAMIN C) 500 mg tablet,chewable, , Disp: , Rfl:   •  calc-D3-magnes-R9-Se-Ux-park 250 mg-400 unit -40 mg-5 mg tablet, Take by mouth., Disp: , Rfl:   •  cholecalciferol (VITAMIN D3) 50,000 unit tablet, Take by mouth., Disp: , Rfl:   •  glucosamine sulfate (GLUCOSAMINE) 500 mg tablet, Take by mouth., Disp: , Rfl:   •  multivitamin with minerals tablet, Take by mouth., Disp: , Rfl:   •  rosuvastatin (CRESTOR) 5 mg tablet, Take 5 mg by mouth daily., Disp: , Rfl:   •  vitB6/mag cit,ox/potassium cit (THERALITH XR ORAL), Take by mouth 4 (four) times a day., Disp: , Rfl:   •  miSOPROStoL (CYTOTEC) 200 mcg tablet, , Disp: , Rfl:   •  miSOPROStoL (CYTOTEC) 200 mcg tablet, Take 1 tablet (200 mcg total) by mouth See admin instr. 2 tablets orally 12 hours prior to procedure, Disp: 2 tablet, Rfl: 0  Sulfa (sulfonamide antibiotics), Epinephrine, Phenylephrine, Pseudoephedrine, and Triprolidine  Cancer-related family history includes Pancreatic cancer in her biological brother.   reports that she has never smoked. She has never used smokeless tobacco. She reports current alcohol use. She reports that she does not use drugs.  ROS: Negative in all systems with the exception of the above    Physical examination: Well-developed female in no apparent distress  Vitals: BP: 137/78  Heart Rate:  "81  SpO2: 98 %  Height: 160 cm (5' 3\")  Weight: 97.5 kg (215 lb) (02/03 1259)  Body mass index is 38.09 kg/m².  HEENT: Normocephalic, atraumatic, nonicteric sclera  Neck: Supple no masses, thyroid normal nontender  Lymphatic: No inguinal or supraclavicular adenopathy  Heart: Regular rate no murmurs  Lungs: Clear to auscultation with good respiratory effort  Extremities: No clubbing, cyanosis, edema  Neuro: Oriented ×3 with normal mood and affect  Abdomen: Soft, nontender, nondistended. No hepatosplenomegaly. No rebound or guarding.      Assessment/Plan   Assesment:  Problem List Items Addressed This Visit        Genitourinary    PMB (postmenopausal bleeding)    Current Assessment & Plan     Patient should have a D&C hysteroscopy, either by Dr. Miller or myself, either in the office or in the operating room.  A blind endometrial biopsy in the face of persistent bleeding and a thickened endometrial stripe would be inappropriate.  She needs directed biopsies.    Georgina has requested an office procedure in our office since she is had surgery with us in the past.  Consent was signed and we will schedule for an office based D&C hysteroscopy         Relevant Medications    miSOPROStoL (CYTOTEC) 200 mcg tablet    Thickened endometrium - Primary    Overview     Spotty VB starting in November.  1/26/22 US Uterus measures 9.3 x 2.8 x 4.1 cm on transvaginal imaging.  Endometrial stripe complex measures 6 mm in thickness.  No focal mass or abnormal vascularity.         Relevant Medications    miSOPROStoL (CYTOTEC) 200 mcg tablet           "

## 2022-02-04 ENCOUNTER — TELEPHONE (OUTPATIENT)
Dept: GYNECOLOGIC ONCOLOGY | Facility: CLINIC | Age: 72
End: 2022-02-04
Payer: MEDICARE

## 2022-02-04 ENCOUNTER — PREP FOR CASE (OUTPATIENT)
Dept: GYNECOLOGIC ONCOLOGY | Facility: CLINIC | Age: 72
End: 2022-02-04
Payer: MEDICARE

## 2022-02-04 DIAGNOSIS — R93.89 THICKENED ENDOMETRIUM: ICD-10-CM

## 2022-02-04 DIAGNOSIS — Z01.818 PREOP TESTING: ICD-10-CM

## 2022-02-04 DIAGNOSIS — N95.0 PMB (POSTMENOPAUSAL BLEEDING): Primary | ICD-10-CM

## 2022-02-04 RX ORDER — CELECOXIB 100 MG/1
400 CAPSULE ORAL ONCE
Status: CANCELLED | OUTPATIENT
Start: 2022-02-04 | End: 2022-02-04

## 2022-02-04 RX ORDER — ACETAMINOPHEN 325 MG/1
975 TABLET ORAL ONCE
Status: CANCELLED | OUTPATIENT
Start: 2022-02-04 | End: 2022-02-04

## 2022-02-04 RX ORDER — SODIUM CHLORIDE, SODIUM GLUCONATE, SODIUM ACETATE, POTASSIUM CHLORIDE AND MAGNESIUM CHLORIDE 30; 37; 368; 526; 502 MG/100ML; MG/100ML; MG/100ML; MG/100ML; MG/100ML
80 INJECTION, SOLUTION INTRAVENOUS CONTINUOUS
Status: CANCELLED | OUTPATIENT
Start: 2022-02-04 | End: 2022-02-05

## 2022-02-04 NOTE — TELEPHONE ENCOUNTER
Spoke to this patient. They want to have their D&C procedure switched to the OR. Sent leeanna and  a message to make them aware.

## 2022-02-07 ENCOUNTER — TELEPHONE (OUTPATIENT)
Dept: GYNECOLOGIC ONCOLOGY | Facility: CLINIC | Age: 72
End: 2022-02-07
Payer: MEDICARE

## 2022-02-07 NOTE — TELEPHONE ENCOUNTER
Called patient to discuss questions. Discussed that there is no need to bring CPAP. Discussed inserting Cytotec night before.

## 2022-02-07 NOTE — TELEPHONE ENCOUNTER
She has questions: Should she be picking up medicine to dilate her cervix. Also, She uses a CPAP at night does she need to bring that with her? She also is not clear whether she's having a D&C or something else.

## 2022-02-08 DIAGNOSIS — Z01.818 PRE-OP EVALUATION: Primary | ICD-10-CM

## 2022-02-10 ENCOUNTER — APPOINTMENT (OUTPATIENT)
Dept: LAB | Age: 72
End: 2022-02-10
Attending: OBSTETRICS & GYNECOLOGY
Payer: MEDICARE

## 2022-02-10 ENCOUNTER — APPOINTMENT (OUTPATIENT)
Dept: CARDIOLOGY | Facility: CLINIC | Age: 72
End: 2022-02-10
Attending: PHYSICIAN ASSISTANT
Payer: MEDICARE

## 2022-02-10 ENCOUNTER — APPOINTMENT (OUTPATIENT)
Dept: PREADMISSION TESTING | Facility: HOSPITAL | Age: 72
End: 2022-02-10
Payer: MEDICARE

## 2022-02-10 VITALS — HEIGHT: 63 IN | WEIGHT: 215 LBS | BODY MASS INDEX: 38.09 KG/M2

## 2022-02-10 DIAGNOSIS — R93.89 THICKENED ENDOMETRIUM: ICD-10-CM

## 2022-02-10 DIAGNOSIS — N95.0 PMB (POSTMENOPAUSAL BLEEDING): ICD-10-CM

## 2022-02-10 DIAGNOSIS — Z01.818 PREOP TESTING: ICD-10-CM

## 2022-02-10 DIAGNOSIS — Z01.818 PRE-OP EVALUATION: ICD-10-CM

## 2022-02-10 LAB
ANION GAP SERPL CALC-SCNC: 9 MEQ/L (ref 3–15)
BUN SERPL-MCNC: 14 MG/DL (ref 8–20)
CALCIUM SERPL-MCNC: 9.6 MG/DL (ref 8.9–10.3)
CHLORIDE SERPL-SCNC: 101 MEQ/L (ref 98–109)
CO2 SERPL-SCNC: 31 MEQ/L (ref 22–32)
CREAT SERPL-MCNC: 0.7 MG/DL (ref 0.6–1.1)
ERYTHROCYTE [DISTWIDTH] IN BLOOD BY AUTOMATED COUNT: 14.2 % (ref 11.7–14.4)
GFR SERPL CREATININE-BSD FRML MDRD: >60 ML/MIN/1.73M*2
GLUCOSE SERPL-MCNC: 103 MG/DL (ref 70–99)
HCT VFR BLDCO AUTO: 43.3 % (ref 35–45)
HGB BLD-MCNC: 13.8 G/DL (ref 11.8–15.7)
MCH RBC QN AUTO: 27.4 PG (ref 28–33.2)
MCHC RBC AUTO-ENTMCNC: 31.9 G/DL (ref 32.2–35.5)
MCV RBC AUTO: 86.1 FL (ref 83–98)
PDW BLD AUTO: 9.8 FL (ref 9.4–12.3)
PLATELET # BLD AUTO: 215 K/UL (ref 150–369)
POTASSIUM SERPL-SCNC: 4.3 MEQ/L (ref 3.6–5.1)
RBC # BLD AUTO: 5.03 M/UL (ref 3.93–5.22)
SODIUM SERPL-SCNC: 141 MEQ/L (ref 136–144)
WBC # BLD AUTO: 7.39 K/UL (ref 3.8–10.5)

## 2022-02-10 PROCEDURE — 93000 ELECTROCARDIOGRAM COMPLETE: CPT | Performed by: PHYSICIAN ASSISTANT

## 2022-02-10 PROCEDURE — 36415 COLL VENOUS BLD VENIPUNCTURE: CPT

## 2022-02-10 PROCEDURE — C9803 HOPD COVID-19 SPEC COLLECT: HCPCS

## 2022-02-10 PROCEDURE — U0003 INFECTIOUS AGENT DETECTION BY NUCLEIC ACID (DNA OR RNA); SEVERE ACUTE RESPIRATORY SYNDROME CORONAVIRUS 2 (SARS-COV-2) (CORONAVIRUS DISEASE [COVID-19]), AMPLIFIED PROBE TECHNIQUE, MAKING USE OF HIGH THROUGHPUT TECHNOLOGIES AS DESCRIBED BY CMS-2020-01-R: HCPCS

## 2022-02-10 PROCEDURE — 80048 BASIC METABOLIC PNL TOTAL CA: CPT

## 2022-02-10 PROCEDURE — 85027 COMPLETE CBC AUTOMATED: CPT

## 2022-02-10 ASSESSMENT — PAIN SCALES - GENERAL: PAINLEVEL: 0-NO PAIN

## 2022-02-10 NOTE — PRE-PROCEDURE INSTRUCTIONS
1. We will call you between 3 pm and 7 pm on February 14, 2022 to determine that arrival time for your procedure. If you do not hear by 6PM. Please call [phone] for arrival time.    2. Please report to Main Entrance near Parking lot A, walk into main lobby and report to the admission desk on the first floor on the day of your procedure.      3. Please follow the following fasting guideline:   NPO after midnight as per Dr. Leyva's instructions        Nothing to eat after midnight.  Unlimited clear liquids, meaning water or PLAIN black coffee WITHOUT any milk or cream, are permitted up to TWO HOURS prior to arrival at the hospital.     4. On the morning of the procedure please take your usual dose of the listed medications with a sip of water:  Tylenol  No NSAIDs, Supplements, Vitamins    May take Tylenol if needed.           5. Other Instructions: You may brush your teeth the morning of the procedure. Rinse and spit, do not swallow.  Bring a list of your medications with dosages with you.    Please follow the hospital provided surgical wash instructions given to you today. If surgical procedure indicates the need for CHG anti-bacterial wash, please use provided soap and follow instructions.  Dial Antibacterial Soap the night before and the morning of the procedure.    6. If you develop a cold, cough, fever, rash, or other symptom prior to the data of the procedure, please report it to your physician immediately.   7. If you need to cancel the procedure for any reason, please contact your physician or call the unit listed above.   8. Make arrangements to have someone drive you home from the procedure. If you have not arranged for transportation home, your surgery may be cancelled.    9. You may not take public transportation unless you are accompanied by a responsible person.   10. You may not drive a car or operate complex or potentially dangerous machinery for 24 hours following anesthesia and/or sedation.    11. If it is medically necessary for you to have a longer stay, you will be informed as soon as the decision is made.   12. Do not wear or bring anything of value to the hospital including jewelry of any kind, money, or wallet. Do not wear make-up or contact lenses. DO bring your glasses and a case. DO NOT BRING MEDICATIONS FROM HOME.   13. No lotion, creams, powders, or oils on skin the morning of procedure    14. Dress in comfortable clothes.   15.  If instructed, please bring a copy of your Advanced Directive (Living Will/Durable Power of ) on the day of your procedure.      Pre operative instructions given as per protocol.  Form explained by: Christel Wells RN     I have read and understand the above information. I have had sufficient opportunity to ask questions I might have and they have been answered to my satisfaction. I agree to comply with the Patient Responsibilities listed above and have received a copy of this form.

## 2022-02-11 LAB — SARS-COV-2 RNA RESP QL NAA+PROBE: NEGATIVE

## 2022-02-14 ENCOUNTER — ANESTHESIA EVENT (OUTPATIENT)
Dept: OPERATING ROOM | Facility: HOSPITAL | Age: 72
Setting detail: HOSPITAL OUTPATIENT SURGERY
End: 2022-02-14
Payer: MEDICARE

## 2022-02-14 NOTE — ANESTHESIA PREPROCEDURE EVALUATION
Relevant Problems   GASTROINTESTINAL   (+) Internal hemorrhoid, bleeding       ROS/Med Hx     Past Surgical History:   Procedure Laterality Date   • HERNIA REPAIR     • JOINT REPLACEMENT     • KIDNEY SURGERY      stones   • KNEE ARTHROSCOPY Bilateral    • OOPHORECTOMY     • SKIN BIOPSY       Patient Active Problem List   Diagnosis   • Internal hemorrhoid, bleeding   • PMB (postmenopausal bleeding)   • Thickened endometrium       No current facility-administered medications for this encounter.       Prior to Admission medications    Medication Sig Start Date End Date Taking? Authorizing Provider   ascorbic acid, vitamin C, (VITAMIN C) 500 mg tablet,chewable Take 1 tablet by mouth daily.   5/1/20   Leonarda Almanza MD   calc-D3-magnes-P8-Pa-Gl-park 250 mg-400 unit -40 mg-5 mg tablet Take 1 tablet by mouth daily.      Leonarda Almanza MD   cholecalciferol (VITAMIN D3) 50,000 unit tablet Take 2,000 Units by mouth daily.      Leonarda Almanza MD   glucosamine sulfate (GLUCOSAMINE) 500 mg tablet Take 1 tablet by mouth daily.      Leonarda Almanza MD   miSOPROStoL (CYTOTEC) 200 mcg tablet  1/26/22   Leonarda Almanza MD   miSOPROStoL (CYTOTEC) 200 mcg tablet Take 1 tablet (200 mcg total) by mouth See admin instr. 2 tablets orally 12 hours prior to procedure 2/3/22 3/5/22  Adryan Leyva MD   multivitamin with minerals tablet Take 1 tablet by mouth daily.      Lenoarda Almanza MD   rosuvastatin (CRESTOR) 5 mg tablet Take 5 mg by mouth daily.    Leonarda Almanza MD   vitB6/mag cit,ox/potassium cit (THERALITH XR ORAL) Take 2 tablets by mouth 2 (two) times a day.      Leonarda Almanza MD       CBC Results       02/10/22 09/29/16 09/28/16     1324 0813 0724    WBC 7.39 7.53 6.63    RBC 5.03 3.72 4.19    HGB 13.8 10.6 11.5    HCT 43.3 32.3 35.8    MCV 86.1 86.8 85.4    MCH 27.4 28.5 27.4    MCHC 31.9 32.8 32.1     172 175          BMP Results       02/10/22 09/29/16 09/28/16      1324 0813 0724     139 138    K 4.3 3.9 4.0    Cl 101 106 104    CO2 31 29 29    Glucose 103 117 129    BUN 14 11 11    Creatinine 0.7 0.7 0.7    Calcium 9.6 8.6 8.7    Anion Gap 9 4 5    EGFR >60.0 -- --          No results found for: HCGPREGUR, PREGSERUM, HCG, HCGQUANT          No orders to display         Physical Exam    Anesthesia Plan    Plan: general    Technique: total IV anesthesia     Lines and Monitors: PIV     Airway: natural airway / supplemental oxygen   ASA 3  Anesthetic plan and risks discussed with: patient  Induction:    intravenous   Postop Plan:   Patient Disposition: inpatient floor planned admission   Pain Management: IV analgesics  Comments:    Plan: Full chart reviewed including all available and relevant H&P, notes, consults, labs, studies, medications, allergies, etc.  Decreased physiologic reserve based on age and comorbidities.  Anesthesia plan discussed in detail.  All questions answered.

## 2022-02-15 ENCOUNTER — ANESTHESIA (OUTPATIENT)
Dept: OPERATING ROOM | Facility: HOSPITAL | Age: 72
Setting detail: HOSPITAL OUTPATIENT SURGERY
End: 2022-02-15
Payer: MEDICARE

## 2022-02-15 ENCOUNTER — HOSPITAL ENCOUNTER (OUTPATIENT)
Facility: HOSPITAL | Age: 72
Setting detail: HOSPITAL OUTPATIENT SURGERY
Discharge: HOME | End: 2022-02-15
Attending: OBSTETRICS & GYNECOLOGY | Admitting: OBSTETRICS & GYNECOLOGY
Payer: MEDICARE

## 2022-02-15 VITALS
OXYGEN SATURATION: 100 % | RESPIRATION RATE: 18 BRPM | SYSTOLIC BLOOD PRESSURE: 122 MMHG | TEMPERATURE: 97 F | HEART RATE: 65 BPM | DIASTOLIC BLOOD PRESSURE: 54 MMHG

## 2022-02-15 DIAGNOSIS — N95.0 PMB (POSTMENOPAUSAL BLEEDING): ICD-10-CM

## 2022-02-15 DIAGNOSIS — R93.89 THICKENED ENDOMETRIUM: ICD-10-CM

## 2022-02-15 PROCEDURE — 71000001 HC PACU PHASE 1 INITIAL 30MIN: Performed by: OBSTETRICS & GYNECOLOGY

## 2022-02-15 PROCEDURE — 36000002 HC OR LEVEL 2 INITIAL 30MIN: Performed by: OBSTETRICS & GYNECOLOGY

## 2022-02-15 PROCEDURE — 37000001 HC ANESTHESIA GENERAL: Performed by: OBSTETRICS & GYNECOLOGY

## 2022-02-15 PROCEDURE — 63700000 HC SELF-ADMINISTRABLE DRUG: Performed by: OBSTETRICS & GYNECOLOGY

## 2022-02-15 PROCEDURE — 63600000 HC DRUGS/DETAIL CODE: Performed by: ANESTHESIOLOGY

## 2022-02-15 PROCEDURE — 25000000 HC PHARMACY GENERAL: Performed by: OBSTETRICS & GYNECOLOGY

## 2022-02-15 PROCEDURE — 25000000 HC PHARMACY GENERAL: Performed by: NURSE ANESTHETIST, CERTIFIED REGISTERED

## 2022-02-15 PROCEDURE — 88305 TISSUE EXAM BY PATHOLOGIST: CPT | Performed by: OBSTETRICS & GYNECOLOGY

## 2022-02-15 PROCEDURE — 0UDB7ZX EXTRACTION OF ENDOMETRIUM, VIA NATURAL OR ARTIFICIAL OPENING, DIAGNOSTIC: ICD-10-PCS | Performed by: OBSTETRICS & GYNECOLOGY

## 2022-02-15 PROCEDURE — 25800000 HC PHARMACY IV SOLUTIONS: Performed by: OBSTETRICS & GYNECOLOGY

## 2022-02-15 PROCEDURE — 71000011 HC PACU PHASE 1 EA ADDL MIN: Performed by: OBSTETRICS & GYNECOLOGY

## 2022-02-15 PROCEDURE — 63600000 HC DRUGS/DETAIL CODE: Performed by: NURSE ANESTHETIST, CERTIFIED REGISTERED

## 2022-02-15 PROCEDURE — 71000002 HC PACU PHASE 2 INITIAL 30MIN: Performed by: OBSTETRICS & GYNECOLOGY

## 2022-02-15 PROCEDURE — 71000012 HC PACU PHASE 2 EA ADDL MIN: Performed by: OBSTETRICS & GYNECOLOGY

## 2022-02-15 PROCEDURE — 58558 HYSTEROSCOPY BIOPSY: CPT | Performed by: OBSTETRICS & GYNECOLOGY

## 2022-02-15 PROCEDURE — 0UJD8ZZ INSPECTION OF UTERUS AND CERVIX, VIA NATURAL OR ARTIFICIAL OPENING ENDOSCOPIC: ICD-10-PCS | Performed by: OBSTETRICS & GYNECOLOGY

## 2022-02-15 RX ORDER — DEXAMETHASONE SODIUM PHOSPHATE 4 MG/ML
INJECTION, SOLUTION INTRA-ARTICULAR; INTRALESIONAL; INTRAMUSCULAR; INTRAVENOUS; SOFT TISSUE AS NEEDED
Status: DISCONTINUED | OUTPATIENT
Start: 2022-02-15 | End: 2022-02-15 | Stop reason: SURG

## 2022-02-15 RX ORDER — OXYCODONE HYDROCHLORIDE 5 MG/1
5 TABLET ORAL EVERY 4 HOURS PRN
Status: DISCONTINUED | OUTPATIENT
Start: 2022-02-15 | End: 2022-02-15 | Stop reason: HOSPADM

## 2022-02-15 RX ORDER — LABETALOL HCL 20 MG/4 ML
5 SYRINGE (ML) INTRAVENOUS AS NEEDED
Status: DISCONTINUED | OUTPATIENT
Start: 2022-02-15 | End: 2022-02-15 | Stop reason: HOSPADM

## 2022-02-15 RX ORDER — IBUPROFEN 200 MG
16-32 TABLET ORAL AS NEEDED
Status: DISCONTINUED | OUTPATIENT
Start: 2022-02-15 | End: 2022-02-15 | Stop reason: HOSPADM

## 2022-02-15 RX ORDER — LIDOCAINE HYDROCHLORIDE 10 MG/ML
INJECTION, SOLUTION INFILTRATION; PERINEURAL
Status: DISCONTINUED | OUTPATIENT
Start: 2022-02-15 | End: 2022-02-15 | Stop reason: HOSPADM

## 2022-02-15 RX ORDER — ONDANSETRON HYDROCHLORIDE 2 MG/ML
INJECTION, SOLUTION INTRAVENOUS AS NEEDED
Status: DISCONTINUED | OUTPATIENT
Start: 2022-02-15 | End: 2022-02-15 | Stop reason: SURG

## 2022-02-15 RX ORDER — PROPOFOL 10 MG/ML
INJECTION, EMULSION INTRAVENOUS AS NEEDED
Status: DISCONTINUED | OUTPATIENT
Start: 2022-02-15 | End: 2022-02-15 | Stop reason: SURG

## 2022-02-15 RX ORDER — CELECOXIB 200 MG/1
400 CAPSULE ORAL ONCE
Status: COMPLETED | OUTPATIENT
Start: 2022-02-15 | End: 2022-02-15

## 2022-02-15 RX ORDER — ONDANSETRON HYDROCHLORIDE 2 MG/ML
4 INJECTION, SOLUTION INTRAVENOUS
Status: DISCONTINUED | OUTPATIENT
Start: 2022-02-15 | End: 2022-02-15 | Stop reason: HOSPADM

## 2022-02-15 RX ORDER — DEXTROSE 40 %
15-30 GEL (GRAM) ORAL AS NEEDED
Status: DISCONTINUED | OUTPATIENT
Start: 2022-02-15 | End: 2022-02-15 | Stop reason: HOSPADM

## 2022-02-15 RX ORDER — DEXTROSE 50 % IN WATER (D50W) INTRAVENOUS SYRINGE
25 AS NEEDED
Status: DISCONTINUED | OUTPATIENT
Start: 2022-02-15 | End: 2022-02-15 | Stop reason: SDUPTHER

## 2022-02-15 RX ORDER — LIDOCAINE HYDROCHLORIDE 10 MG/ML
INJECTION, SOLUTION INFILTRATION; PERINEURAL AS NEEDED
Status: DISCONTINUED | OUTPATIENT
Start: 2022-02-15 | End: 2022-02-15 | Stop reason: SURG

## 2022-02-15 RX ORDER — DEXTROSE 40 %
15-30 GEL (GRAM) ORAL AS NEEDED
Status: DISCONTINUED | OUTPATIENT
Start: 2022-02-15 | End: 2022-02-15 | Stop reason: SDUPTHER

## 2022-02-15 RX ORDER — ACETAMINOPHEN 325 MG/1
975 TABLET ORAL EVERY 6 HOURS PRN
Status: DISCONTINUED | OUTPATIENT
Start: 2022-02-15 | End: 2022-02-15 | Stop reason: HOSPADM

## 2022-02-15 RX ORDER — ONDANSETRON 4 MG/1
4 TABLET, ORALLY DISINTEGRATING ORAL EVERY 8 HOURS PRN
Status: DISCONTINUED | OUTPATIENT
Start: 2022-02-15 | End: 2022-02-15 | Stop reason: HOSPADM

## 2022-02-15 RX ORDER — ACETAMINOPHEN 325 MG/1
975 TABLET ORAL ONCE
Status: COMPLETED | OUTPATIENT
Start: 2022-02-15 | End: 2022-02-15

## 2022-02-15 RX ORDER — IBUPROFEN 200 MG
16-32 TABLET ORAL AS NEEDED
Status: DISCONTINUED | OUTPATIENT
Start: 2022-02-15 | End: 2022-02-15 | Stop reason: SDUPTHER

## 2022-02-15 RX ORDER — DEXTROSE 50 % IN WATER (D50W) INTRAVENOUS SYRINGE
25 AS NEEDED
Status: DISCONTINUED | OUTPATIENT
Start: 2022-02-15 | End: 2022-02-15 | Stop reason: HOSPADM

## 2022-02-15 RX ORDER — FENTANYL CITRATE 50 UG/ML
INJECTION, SOLUTION INTRAMUSCULAR; INTRAVENOUS AS NEEDED
Status: DISCONTINUED | OUTPATIENT
Start: 2022-02-15 | End: 2022-02-15 | Stop reason: SURG

## 2022-02-15 RX ORDER — MIDAZOLAM HYDROCHLORIDE 2 MG/2ML
INJECTION, SOLUTION INTRAMUSCULAR; INTRAVENOUS AS NEEDED
Status: DISCONTINUED | OUTPATIENT
Start: 2022-02-15 | End: 2022-02-15 | Stop reason: SURG

## 2022-02-15 RX ORDER — SODIUM CHLORIDE, SODIUM GLUCONATE, SODIUM ACETATE, POTASSIUM CHLORIDE AND MAGNESIUM CHLORIDE 30; 37; 368; 526; 502 MG/100ML; MG/100ML; MG/100ML; MG/100ML; MG/100ML
80 INJECTION, SOLUTION INTRAVENOUS CONTINUOUS
Status: DISCONTINUED | OUTPATIENT
Start: 2022-02-15 | End: 2022-02-15 | Stop reason: HOSPADM

## 2022-02-15 RX ORDER — PROPOFOL 10 MG/ML
INJECTION, EMULSION INTRAVENOUS CONTINUOUS PRN
Status: DISCONTINUED | OUTPATIENT
Start: 2022-02-15 | End: 2022-02-15 | Stop reason: SURG

## 2022-02-15 RX ORDER — FENTANYL CITRATE 50 UG/ML
25 INJECTION, SOLUTION INTRAMUSCULAR; INTRAVENOUS
Status: DISCONTINUED | OUTPATIENT
Start: 2022-02-15 | End: 2022-02-15 | Stop reason: HOSPADM

## 2022-02-15 RX ADMIN — LIDOCAINE HYDROCHLORIDE 5 ML: 10 INJECTION, SOLUTION INFILTRATION; PERINEURAL at 07:37

## 2022-02-15 RX ADMIN — FENTANYL CITRATE 25 MCG: 50 INJECTION, SOLUTION INTRAMUSCULAR; INTRAVENOUS at 07:37

## 2022-02-15 RX ADMIN — DEXAMETHASONE SODIUM PHOSPHATE 4 MG: 4 INJECTION, SOLUTION INTRAMUSCULAR; INTRAVENOUS at 07:46

## 2022-02-15 RX ADMIN — FENTANYL CITRATE 25 MCG: 50 INJECTION INTRAMUSCULAR; INTRAVENOUS at 08:40

## 2022-02-15 RX ADMIN — CELECOXIB 400 MG: 200 CAPSULE ORAL at 06:41

## 2022-02-15 RX ADMIN — MIDAZOLAM HYDROCHLORIDE 2 MG: 1 INJECTION, SOLUTION INTRAMUSCULAR; INTRAVENOUS at 07:31

## 2022-02-15 RX ADMIN — PROPOFOL 150 MCG/KG/MIN: 10 INJECTION, EMULSION INTRAVENOUS at 07:37

## 2022-02-15 RX ADMIN — ONDANSETRON HYDROCHLORIDE 4 MG: 2 SOLUTION INTRAMUSCULAR; INTRAVENOUS at 07:46

## 2022-02-15 RX ADMIN — ACETAMINOPHEN 975 MG: 325 TABLET, FILM COATED ORAL at 06:41

## 2022-02-15 RX ADMIN — FENTANYL CITRATE 25 MCG: 50 INJECTION INTRAMUSCULAR; INTRAVENOUS at 09:00

## 2022-02-15 RX ADMIN — PROPOFOL INJECTABLE EMULSION 25 MG: 10 INJECTION, EMULSION INTRAVENOUS at 07:37

## 2022-02-15 RX ADMIN — SODIUM CHLORIDE, SODIUM GLUCONATE, SODIUM ACETATE, POTASSIUM CHLORIDE AND MAGNESIUM CHLORIDE 80 ML/HR: 526; 502; 368; 37; 30 INJECTION, SOLUTION INTRAVENOUS at 06:45

## 2022-02-15 NOTE — ANESTHESIOLOGIST PRE-PROCEDURE ATTESTATION
Pre-Procedure Patient Identification:  I am the Primary Anesthesiologist and have identified the patient on 02/15/22 at 6:28 AM.   I have confirmed the procedure(s) will be performed by the following surgeon/proceduralist Adryan Leyva MD.

## 2022-02-15 NOTE — DISCHARGE INSTRUCTIONS
[facility]  Dilation and Curettage (D&C/Hysteroscopy)  Discharge Instructions    What to Expect    It is normal to have light bleeding.  This can occur immediately after the procedure or in a few days.      It is normal to experience some mild cramping after the procedure but this usually does not last for more than a few days.  You may take Acetaminophen (Tylenol) or Ibuprofen (Motrin, Advil) as directed for pain.    Activity      Please do not get in a swimming pool, hot tub or tub bath for at least 2-3 days after your procedure.      You may resume normal activities in 1-2 days. Please delay sexual intercourse for at least 5-7 days.      PLEASE CALL THE OFFICE [phone] IF YOU EXPERIENCE ANY OF THE FOLLOWING:    - Heavy bleeding (using one pad an hour)  - Fever greater than 101.0   - Foul smelling vaginal discharge   - Worsening pain or any other concerns

## 2022-02-15 NOTE — ANESTHESIA POSTPROCEDURE EVALUATION
Patient: Em Briseno    Procedure Summary     Date: 02/15/22 Room / Location: [facility] OR 14 / [facility] OR    Anesthesia Start: 0731 Anesthesia Stop: 0807    Procedure: D&C, HYSTEROSCOPY DIAGNOSTIC (N/A ) Diagnosis:       PMB (postmenopausal bleeding)      Thickened endometrium      (PMB (postmenopausal bleeding) [N95.0])      (Thickened endometrium [R93.89])    Surgeons: Adryan Leyva MD Responsible Provider: Bao Mims MD    Anesthesia Type: general ASA Status: 3          Anesthesia Type: general  PACU Vitals    No data found in the last 10 encounters.           Anesthesia Post Evaluation    Mode of pain management: IV medication  Patient location during evaluation: PACU  Patient participation: complete - patient participated  Level of consciousness: awake  Cardiovascular status: acceptable  Airway Patency: patent  Respiratory status: acceptable, spontaneous ventilation and unassisted  Hydration status: stable  Anesthetic complications: no  Comments: Full report to PACU RN.  Vitals stable.  Patent airway, good spontaneous respirations.    nasal supernova

## 2022-02-15 NOTE — OP NOTE
D&C, HYSTEROSCOPY DIAGNOSTIC Procedure Note    Pre-op Diagnosis:  post-menopausal bleeding and thickened endometrium    Post-op Diagnosis: same    Procedure: Procedure:    D&C, HYSTEROSCOPY DIAGNOSTIC  CPT(R) Code:  47352 - MA HYSTEROSCOPY,W/ENDO BX      Surgeon: Adryan Leyva MD    Assistant: Juan Pablo Kelley MD PGY3    Findings: Significant cervical densnsus to level of introitus. Significant anterior compartment prolapse. Polypoid appearing endometrium with papillary projection    Estimated Blood Loss:  5mL           Drains: none           Specimens:   ID Type Source Tests Collected by Time Destination   1 : Endometrial currettings Tissue Endometrium PATHOLOGY TISSUE EXAM Adryan Leyva MD 2/15/2022 0754               Complications:  none           Disposition:  francy Briseno presented to [facility] on 2/15/2022 The surgeon and patient were mutually identified in the preoperative area. Her consents were reconfirmed and her questions were again answered. She was taken to the operating suite and administered MAC anesthesia . She was placed in the dorsal lithotomy position. A bimanual exam under anesthesia revealed a 8-week-sized, anteverted uterus. She was prepped and draped in the usual sterile fashion. A timeout was performed.     A Harden speculum and Keyur retractor were placed in the patient’s vagina. The anterior lip of the cervix was grasped with a single-tooth tenaculum.  10cc of 1% Lidocaine was injected as a paracervical block. The uterus was sounded to 8cm with a nolan dilator. Her cervix was then serially dilated using Nolan dilators to accommodate a 5mm hysteroscope, which was advanced through the cervix into the uterine fundus and uterine distention was applied. Polypoid tissue with projections was noted. There were no signs of injury or uterine perforation. The hysteroscope was removed. The cervix was further dilated to accommodate a sharp curette.    Gentle sharp curettage  was then performed in 360° fashion. This specimen was sent to pathology for permanent evaluation. Bleeding at the cervical os was noted to be minimal. The hysteroscope was reinserted and the projections seen prior to the procedure were gone. The tenaculum was removed from the cervix and the site was hemostatic. All sponges, instruments, needles were removed from the patient’s vagina. All counts were found to be correct. Fluid deficit was noted to be minimal.The patient was awoken in the operating room and taken to the PACU awake and in stable condition.    Dr. Leyva was present and scrubbed for the entire procedure.     Juan Pablo Kelley MD PGY3  Obstetrics and Gynecology  Beeper #[id number]

## 2022-02-15 NOTE — OR SURGEON
Pre-Procedure patient identification:  I am the primary operating surgeon/proceduralist and I have identified the patient on 02/15/22 at 6:57 AM Adryan Leyva MD  Phone Number: [phone]

## 2022-02-16 ENCOUNTER — TELEPHONE (OUTPATIENT)
Dept: GYNECOLOGIC ONCOLOGY | Facility: CLINIC | Age: 72
End: 2022-02-16
Payer: MEDICARE

## 2022-02-16 LAB
CASE RPRT: NORMAL
CLINICAL INFO: NORMAL
PATH REPORT.FINAL DX SPEC: NORMAL
PATH REPORT.GROSS SPEC: NORMAL

## 2022-02-17 ENCOUNTER — TELEPHONE (OUTPATIENT)
Dept: GYNECOLOGIC ONCOLOGY | Facility: CLINIC | Age: 72
End: 2022-02-17
Payer: MEDICARE

## 2022-02-24 ENCOUNTER — TELEPHONE (OUTPATIENT)
Dept: GYNECOLOGIC ONCOLOGY | Facility: CLINIC | Age: 72
End: 2022-02-24
Payer: MEDICARE

## 2022-02-24 DIAGNOSIS — N81.10 VAGINAL PROLAPSE: Primary | ICD-10-CM

## 2022-02-24 NOTE — TELEPHONE ENCOUNTER
Spoke to Pat. Told her spotting after a D&C is normal. She told me that  said if she is till having bleeding mid-march, to give our office a call to schedule surgery.  Told her that she is good to get her reclast injection.

## 2022-03-04 NOTE — TELEPHONE ENCOUNTER
Patient called the office again regarding intermittent spotty bleeding since D&C on 2/15/22. Discussed with patient that spotty bleeding can be normal up until 2 weeks after D&C. She denies any heavy vaginal bleeding, hypovolemic symptoms. Discussed plan to call our office if bleeding persists after march 17; per Dr. Leyva's plan for further evaluation and to discuss hysterectomy if bleeding persists.     Patient also wants referral to see uro/gyn for her prolapse.referral provided for Dr. Orozco.

## 2022-03-17 ENCOUNTER — TELEPHONE (OUTPATIENT)
Dept: GYNECOLOGIC ONCOLOGY | Facility: CLINIC | Age: 72
End: 2022-03-17
Payer: MEDICARE

## 2022-03-17 NOTE — TELEPHONE ENCOUNTER
Pt reached out was told to call back if she was having any further issues after her procedure ,will like a call back

## 2022-03-17 NOTE — TELEPHONE ENCOUNTER
Looks like Dr. Leyva spoke with this patient. I called to follow-up to make sure she didn't have any other additional questions/concerns. She is scheduled to see Dr. Orozco.

## 2022-03-17 NOTE — TELEPHONE ENCOUNTER
Corrected - Test was 18th bled for 8 days bled until 23rd - didn't bleed again until March 3rd only once. Did not bleed 4 days ago like it was stated in last message. Only called today because Dr. Leyva said to call him by the 17th to give update. Wanted to make sure correct info is in MyChart.

## 2022-03-17 NOTE — TELEPHONE ENCOUNTER
VB 8 days after procedure, stopped but then episode 4 days ago.     Thinks may be related to prolapse. Seeing Dr. Orozco on 3/31

## 2022-03-31 ENCOUNTER — OFFICE VISIT (OUTPATIENT)
Dept: UROGYNECOLOGY | Facility: CLINIC | Age: 72
End: 2022-03-31
Payer: MEDICARE

## 2022-03-31 VITALS
BODY MASS INDEX: 37.03 KG/M2 | DIASTOLIC BLOOD PRESSURE: 71 MMHG | WEIGHT: 209 LBS | HEIGHT: 63 IN | SYSTOLIC BLOOD PRESSURE: 150 MMHG

## 2022-03-31 DIAGNOSIS — E66.09 CLASS 2 OBESITY DUE TO EXCESS CALORIES WITHOUT SERIOUS COMORBIDITY WITH BODY MASS INDEX (BMI) OF 37.0 TO 37.9 IN ADULT: ICD-10-CM

## 2022-03-31 DIAGNOSIS — N81.2 UTEROVAGINAL PROLAPSE, INCOMPLETE: Primary | ICD-10-CM

## 2022-03-31 DIAGNOSIS — N95.0 PMB (POSTMENOPAUSAL BLEEDING): ICD-10-CM

## 2022-03-31 DIAGNOSIS — N39.3 STRESS INCONTINENCE: ICD-10-CM

## 2022-03-31 DIAGNOSIS — E66.812 CLASS 2 OBESITY DUE TO EXCESS CALORIES WITHOUT SERIOUS COMORBIDITY WITH BODY MASS INDEX (BMI) OF 37.0 TO 37.9 IN ADULT: ICD-10-CM

## 2022-03-31 DIAGNOSIS — N30.00 ACUTE CYSTITIS WITHOUT HEMATURIA: ICD-10-CM

## 2022-03-31 PROCEDURE — 87086 URINE CULTURE/COLONY COUNT: CPT | Performed by: OBSTETRICS & GYNECOLOGY

## 2022-03-31 PROCEDURE — 99205 OFFICE O/P NEW HI 60 MIN: CPT | Mod: 25,GC | Performed by: OBSTETRICS & GYNECOLOGY

## 2022-03-31 PROCEDURE — 51701 INSERT BLADDER CATHETER: CPT | Mod: GC | Performed by: OBSTETRICS & GYNECOLOGY

## 2022-03-31 NOTE — ASSESSMENT & PLAN NOTE
A point-of-care urinalysis on a clean-catch specimen showed the presence of leukocyte esterase and heme. I therefore ordered a urine culture on a catheterized specimen. Furthermore, I reviewed her previous urine cultures in our system, with the following results:  No results found for: URINECX    I will follow-up on the result of her urine studies, and will offer antibiotics and/or further evaluation, if appropriate.

## 2022-03-31 NOTE — ASSESSMENT & PLAN NOTE
Ms. Briseno has stress urinary incontinence by history. She has a negative urinalysis and her post void residual is within normal limits. Her symptoms occur frequently and are having a significantly negative impact on her quality of life. She has not yet tried behavioral modifications and pelvic exercises, without significant improvement. We discussed expectant management, conservative management, weight loss, urethral bulking, and sling surgery. Patient education was provided regarding surgical and non-surgical options for stress urinary incontinence, including risks and complications. We discussed the specific risks of mesh, and I also gave her the TVT Exact sling brochure from [employer], and a copy of the [employer] joint position statement about mesh midurethral slings for stress urinary incontinence. Ms. Briseno was provided handouts from [employer] about midurethral slings and urodynamic testing for more information.

## 2022-03-31 NOTE — PATIENT INSTRUCTIONS
If you have any problems or concerns, call our office at [phone].  If you think you are having a medical emergency, please call [phone].    If you have access to [facility] (the online patient portal), results from tests ordered at your visit (such as urine tests or ultrasounds) will appear in your portal as soon as they are ready. Please understand that you may see these results faster than we do. Please do not call about the results immediately - we will review your results and contact you after we have had time to analyze your tests.     Please also understand that we frequently do not receive messages sent to us by patients through [facility]. If you have questions or concerns, please call our office.

## 2022-03-31 NOTE — PROGRESS NOTES
"Ms. Briseno is seen in consultation regarding leaking urine and prolapse at the request of Dr. Leyva      CC: urinary leaking and prolapse     HPI: This is a 71 y.o.  who presents to discuss her symptoms of urinary leaking and prolpase. Of note, the patient has a history of post-menopausal bleeding and was found to have a thickened endometrial stripe of 6mm on ultrasound. The patient underwent a D&C, hysteroscopy. I personally reviewed the operative note from 2/15/22 where polypoid endometrial tissue was visualized. I also personally reviewed the pathology report, where no endometrial tissue was visualized in the curetting sample provided. In regards to her bleeding, she notes a few episodes of vaginal bleeding over the past year. She notices bleeding especially after walking. The bleeding is always spotting and she has not reported passing blood clots.     Urinary Symptoms: She has noted leaking with exercise, as well as, when she has a \"bad\" cough. She uses panty liner pads sometimes for protection. She denies nocturnal enuresis.  She denies recurrent or frequent urinary tract infections, had no urinary tract infections in the last year, denies dysuria, and denies hematuria. Denies symptoms of urge incontinence. Reports that she empties completely when she void.     Prolapse Symptoms: Em Briseno feels a \"bulge\" and pressure after lifting something heavy. She also does need to reduce the prolapse.     Bowel Symptoms: Denies constipation or diarrhea.  She denies splinting.  She denies anal incontinence.    Sexual Function: She is not sexually active.     She  has a past medical history of Arthritis, Bladder prolapse, female, acquired, Bronchitis, Concussion, IBS (irritable bowel syndrome), Kidney stones, Lipid disorder, Murmur, cardiac, Osteoporosis, and Sleep apnea.    She  has a past surgical history that includes Knee arthroscopy (Bilateral); Hernia repair; Joint replacement; Oophorectomy; Kidney surgery; " and Skin biopsy.      Current Outpatient Medications:   •  ascorbic acid, vitamin C, 500 mg tablet,chewable, Take 1 tablet by mouth daily.  , Disp: , Rfl:   •  calc-D3-magnes-E1-Bo-Go-park 250 mg-400 unit -40 mg-5 mg tablet, Take 1 tablet by mouth daily.  , Disp: , Rfl:   •  cholecalciferol (VITAMIN D3) 50,000 unit tablet, Take 2,000 Units by mouth daily.  , Disp: , Rfl:   •  glucosamine sulfate 500 mg tablet, Take 1 tablet by mouth daily.  , Disp: , Rfl:   •  multivitamin with minerals tablet, Take 1 tablet by mouth daily.  , Disp: , Rfl:   •  rosuvastatin (CRESTOR) 5 mg tablet, Take 5 mg by mouth daily., Disp: , Rfl:   •  vitB6/mag cit,ox/potassium cit (THERALITH XR ORAL), Take 2 tablets by mouth 2 (two) times a day.  , Disp: , Rfl:   •  zoledronic acid/mannitol-water (RECLAST IV), Infuse into a venous catheter., Disp: , Rfl:     She is allergic to sulfa (sulfonamide antibiotics), epinephrine, phenylephrine, pseudoephedrine, and triprolidine.    Her social history is not contributory.    Her family history is not contributory.    Review of Systems    Review of Systems   All other systems reviewed and are negative.      Physical Exam    Vitals:    03/31/22 1409   BP: (!) 150/71     Weight: 94.8 kg (209 lb)   Body mass index is 37.02 kg/m².   Constitutional: She is oriented to person, place, and time and well-developed, well-nourished, and in no distress.  Psychiatric: Mood, memory, affect and judgment normal.   Neurological: Pleasant and oriented.   Neck: Normal in appearance.   Back: No tenderness over the spine or CVA tenderness.  Cardiovascular: No JVD.  No lower extremity edema.  Pulmonary/Chest: Effort normal. No respiratory distress.   Skin: Skin is warm and dry. No rash noted. No pallor.   Abd: Soft, non tender.    Pelvic Exam (a female chaperone was present during the entire pelvic examination)    Normal external genitalia, including clitoris, urethral meatus and perineal body.  NAOMI was not demonstrable  with cough or Valsalva in the lithotomy position, approximately 15 mine after her last spontaneous void.    PROCEDURE NOTE FOR POST-VOID RESIDUAL: (81120)  The urethra was prepped, and a lubricated 12 [place] catheter was inserted to collect a post void residual.  The procedure was well tolerated by the patient  The PVR amount is 60 mL.     POC UA was ordered: positive for trace heme, positive for LE, negative for nitrites detected.    Speculum examination: Vaginal epithelium was normal, without any lesions or masses.  The cervix was mobile, nontender.  Bimanual exam: The uterus was mobile, non-tender.  There were no masses or tenderness in the pelvis/adnexal region.  Rectal exam: Normally-situated anus.  JESSICA deferred.    POP-Q: Physical Exam  Genitourinary:   POP-Q measurements were:      Aa: +3, Ba: +5, C: +2     gH: 4/6, pB: 2, TVL: 10     Ap: -2, Bp: -2, D: -3              Assessment/Plan     Uterovaginal prolapse, incomplete  Ms. Briseno has stage III A uterovaginal prolapse. I discussed multiple options, including expectant management, conservative management with pessary and/or pelvic floor PT, and surgical management. I discussed transabdominal and transvaginal, as well as native-tissue vs mesh-augmented surgical approaches.  Ms Briseno is currently being seen by Dr Leyva for post-menopausal bleeding. We discussed the possibility of a combined surgery with Dr Leyva versus performing the surgery by myself. We discussed that Dr Leyav and I will have a discussion and will get back to her.         Class 2 obesity due to excess calories without serious comorbidity with body mass index (BMI) of 37.0 to 37.9 in adult  Her obesity is likely contributing to her incontinence.  I explained that research has shown that weight loss of 5-10% could improve her leakage by approximately 50%. We discussed the fact that a BMI of > 30 may adversely affect success rates for anti-incontinence surgery such as mid urethral slings. I  also explained that research has shown that women who are overweight or obese are more likely to have lower urinary tract symptoms, and that urgency and urgency urinary incontinence are more refractory to treatment in these women. As such, weight loss via diet and exercise were encouraged.         Stress incontinence  Ms. Briseno has stress urinary incontinence by history. She has a negative urinalysis and her post void residual is within normal limits. Her symptoms occur frequently and are having a significantly negative impact on her quality of life. She has not yet tried behavioral modifications and pelvic exercises, without significant improvement. We discussed expectant management, conservative management, weight loss, urethral bulking, and sling surgery. Patient education was provided regarding surgical and non-surgical options for stress urinary incontinence, including risks and complications. We discussed the specific risks of mesh, and I also gave her the TVT Exact sling brochure from [employer], and a copy of the AUGS/SUFU joint position statement about mesh midurethral slings for stress urinary incontinence. Ms. Briseno was provided handouts from [facility] about midurethral slings and urodynamic testing for more information.            Acute cystitis without hematuria  A point-of-care urinalysis on a clean-catch specimen showed the presence of leukocyte esterase and heme. I therefore ordered a urine culture on a catheterized specimen. Furthermore, I reviewed her previous urine cultures in our system, with the following results:  No results found for: URINECX    I will follow-up on the result of her urine studies, and will offer antibiotics and/or further evaluation, if appropriate.        Return for Urodynamics, Dede will schedule.       Chrystal Yost MD    I performed a history and physical examination of the patient and discussed the management with the Resident. I reviewed the Resident's note  and agree with the documented findings and plan of care, except for my comments below or within the additional notes today.    I spent 70 minutes on this date of service performing the following activities: obtaining history, performing examination, documenting, obtaining / reviewing records, providing counseling and education and coordinating care.     Jairo Orozco MD PhD

## 2022-03-31 NOTE — ASSESSMENT & PLAN NOTE
Her obesity is likely contributing to her incontinence.  I explained that research has shown that weight loss of 5-10% could improve her leakage by approximately 50%. We discussed the fact that a BMI of > 30 may adversely affect success rates for anti-incontinence surgery such as mid urethral slings. I also explained that research has shown that women who are overweight or obese are more likely to have lower urinary tract symptoms, and that urgency and urgency urinary incontinence are more refractory to treatment in these women. As such, weight loss via diet and exercise were encouraged.

## 2022-03-31 NOTE — ASSESSMENT & PLAN NOTE
Ms. Briseno has stage III A uterovaginal prolapse. I discussed multiple options, including expectant management, conservative management with pessary and/or pelvic floor PT, and surgical management. I discussed transabdominal and transvaginal, as well as native-tissue vs mesh-augmented surgical approaches.  Ms Briseno is currently being seen by Dr Leyva for post-menopausal bleeding. We discussed the possibility of a combined surgery with Dr Leyva versus performing the surgery by myself. We discussed that Dr Leyva and I will have a discussion and will get back to her.

## 2022-04-01 ENCOUNTER — TELEPHONE (OUTPATIENT)
Dept: UROGYNECOLOGY | Facility: CLINIC | Age: 72
End: 2022-04-01
Payer: MEDICARE

## 2022-04-01 NOTE — TELEPHONE ENCOUNTER
I called Pat to let her know that I spoke to Dr. Leyva and he did not think he needed to be involved in her surgery. She had several questions about the surgery, recovery and treatment options. She is most interested in having surgery at the [facility], so I will ask my surgery scheduler to call Pat and discuss possible dates for the surgery (vaginal hysterectomy, apical ligament suspension, combined anterior and posterior colporrhaphy, cystoscopy and possible TVT).    Jairo Orozco MD PhD

## 2022-04-02 LAB — BACTERIA UR CULT: NORMAL

## 2022-04-04 ENCOUNTER — TELEPHONE (OUTPATIENT)
Dept: GYNECOLOGIC ONCOLOGY | Facility: CLINIC | Age: 72
End: 2022-04-04
Payer: MEDICARE

## 2022-04-05 ENCOUNTER — TELEPHONE (OUTPATIENT)
Dept: GYNECOLOGIC ONCOLOGY | Facility: CLINIC | Age: 72
End: 2022-04-05
Payer: MEDICARE

## 2022-04-12 ENCOUNTER — TRANSCRIBE ORDERS (OUTPATIENT)
Dept: SCHEDULING | Age: 72
End: 2022-04-12

## 2022-04-12 DIAGNOSIS — N95.0 POSTMENOPAUSAL BLEEDING: Primary | ICD-10-CM

## 2022-04-20 ENCOUNTER — HOSPITAL ENCOUNTER (OUTPATIENT)
Dept: RADIOLOGY | Age: 72
Discharge: HOME | End: 2022-04-20
Attending: OBSTETRICS & GYNECOLOGY
Payer: MEDICARE

## 2022-04-20 DIAGNOSIS — N95.0 POSTMENOPAUSAL BLEEDING: ICD-10-CM

## 2022-04-20 PROCEDURE — 76830 TRANSVAGINAL US NON-OB: CPT

## 2022-04-20 PROCEDURE — 76856 US EXAM PELVIC COMPLETE: CPT

## 2022-04-27 ENCOUNTER — TRANSCRIBE ORDERS (OUTPATIENT)
Dept: LAB | Age: 72
End: 2022-04-27

## 2022-04-27 ENCOUNTER — APPOINTMENT (OUTPATIENT)
Dept: LAB | Age: 72
End: 2022-04-27
Attending: INTERNAL MEDICINE
Payer: MEDICARE

## 2022-04-27 DIAGNOSIS — E78.00 PURE HYPERCHOLESTEROLEMIA, UNSPECIFIED: ICD-10-CM

## 2022-04-27 DIAGNOSIS — E78.00 PURE HYPERCHOLESTEROLEMIA, UNSPECIFIED: Primary | ICD-10-CM

## 2022-04-27 LAB
ALT SERPL-CCNC: 25 IU/L (ref 11–54)
AST SERPL-CCNC: 25 IU/L (ref 15–41)
CHOLEST SERPL-MCNC: 160 MG/DL
CK SERPL-CCNC: 72 U/L (ref 15–200)
HDLC SERPL-MCNC: 61 MG/DL
HDLC SERPL: 2.6 {RATIO}
LDLC SERPL CALC-MCNC: 80 MG/DL
NONHDLC SERPL-MCNC: 99 MG/DL
TRIGL SERPL-MCNC: 93 MG/DL (ref 30–149)

## 2022-04-27 PROCEDURE — 82550 ASSAY OF CK (CPK): CPT

## 2022-04-27 PROCEDURE — 84460 ALANINE AMINO (ALT) (SGPT): CPT

## 2022-04-27 PROCEDURE — 80061 LIPID PANEL: CPT

## 2022-04-27 PROCEDURE — 36415 COLL VENOUS BLD VENIPUNCTURE: CPT

## 2022-04-27 PROCEDURE — 84450 TRANSFERASE (AST) (SGOT): CPT

## 2022-05-23 ENCOUNTER — TELEPHONE (OUTPATIENT)
Dept: GYNECOLOGIC ONCOLOGY | Facility: CLINIC | Age: 72
End: 2022-05-23
Payer: MEDICARE

## 2022-05-23 NOTE — TELEPHONE ENCOUNTER
Called and spoke with Pat. She is s/p D&C hysteroscopy with you on 2/15/22 for PMB. Pathology revealed no endometrial tissue. You recommended TLH because she had another episode of vaginal bleeding a few weeks after this procedure. Patient also has prolapse and was counseled on sling with Dr. Orozco. She wanted to treat prolapse more conservatively and also does not want a hysterectomy unless absolutely necessary. She went back to Dr. Sierra's office and they performed another in office EMBX.Results showed benign findings. She went for follow-up US and US showed normal EMS. Patient wants to know if you still recommend TLH at this time. She denies any further vaginal bleeding since 3/2022.      Let me know what you think and I can call her with plan/recommendations

## 2022-05-23 NOTE — TELEPHONE ENCOUNTER
Patient has a long message for Dr. Leyva but would like to speak to Tanvi berg about some concerns.

## 2022-05-27 NOTE — TELEPHONE ENCOUNTER
Called patient back after discussing with Dr. Leyva. Advised patient that our recommendations are the same and we still recommend TL. Discussed with patient that she may hold off 1-2 months if she would like. If she experiences any vaginal bleeding she must contact us immediately. Patient advised to call and schedule pre-op appointment 1 month out from when she would like to have the surgery.

## 2022-06-02 ENCOUNTER — TELEPHONE (OUTPATIENT)
Dept: GYNECOLOGIC ONCOLOGY | Facility: CLINIC | Age: 72
End: 2022-06-02
Payer: MEDICARE

## 2022-06-02 NOTE — TELEPHONE ENCOUNTER
Called to discuss next steps. Will reach out to Dr. Sierra, if no bleeding OK to observe understanding small residual risk of undiagnosed endometrial cancer.

## 2022-08-08 ENCOUNTER — TRANSCRIBE ORDERS (OUTPATIENT)
Dept: SCHEDULING | Age: 72
End: 2022-08-08

## 2022-08-08 DIAGNOSIS — M81.0 AGE-RELATED OSTEOPOROSIS WITHOUT CURRENT PATHOLOGICAL FRACTURE: Primary | ICD-10-CM

## 2022-09-09 ENCOUNTER — HOSPITAL ENCOUNTER (OUTPATIENT)
Dept: RADIOLOGY | Age: 72
Discharge: HOME | End: 2022-09-09
Attending: INTERNAL MEDICINE
Payer: MEDICARE

## 2022-09-09 DIAGNOSIS — M81.0 AGE-RELATED OSTEOPOROSIS WITHOUT CURRENT PATHOLOGICAL FRACTURE: ICD-10-CM

## 2022-09-09 PROCEDURE — 77080 DXA BONE DENSITY AXIAL: CPT

## 2022-09-12 ENCOUNTER — TRANSCRIBE ORDERS (OUTPATIENT)
Dept: SCHEDULING | Age: 72
End: 2022-09-12

## 2022-09-27 ENCOUNTER — HOSPITAL ENCOUNTER (OUTPATIENT)
Dept: RADIOLOGY | Age: 72
Discharge: HOME | End: 2022-09-27
Attending: OBSTETRICS & GYNECOLOGY
Payer: MEDICARE

## 2022-09-27 ENCOUNTER — TRANSCRIBE ORDERS (OUTPATIENT)
Dept: RADIOLOGY | Age: 72
End: 2022-09-27

## 2022-09-27 DIAGNOSIS — Z12.31 ENCOUNTER FOR SCREENING MAMMOGRAM FOR MALIGNANT NEOPLASM OF BREAST: Primary | ICD-10-CM

## 2022-09-27 DIAGNOSIS — Z12.31 ENCOUNTER FOR SCREENING MAMMOGRAM FOR MALIGNANT NEOPLASM OF BREAST: ICD-10-CM

## 2022-09-27 PROCEDURE — 77067 SCR MAMMO BI INCL CAD: CPT

## 2022-09-30 ENCOUNTER — TRANSCRIBE ORDERS (OUTPATIENT)
Dept: SCHEDULING | Age: 72
End: 2022-09-30

## 2022-09-30 DIAGNOSIS — N20.0 CALCULUS OF KIDNEY: Primary | ICD-10-CM

## 2022-10-04 ENCOUNTER — HOSPITAL ENCOUNTER (OUTPATIENT)
Dept: RADIOLOGY | Facility: HOSPITAL | Age: 72
Discharge: HOME | End: 2022-10-04
Attending: UROLOGY
Payer: MEDICARE

## 2022-10-04 DIAGNOSIS — N20.0 CALCULUS OF KIDNEY: ICD-10-CM

## 2022-10-04 PROCEDURE — 74018 RADEX ABDOMEN 1 VIEW: CPT

## 2022-10-04 PROCEDURE — 76775 US EXAM ABDO BACK WALL LIM: CPT

## 2022-11-21 ENCOUNTER — TRANSCRIBE ORDERS (OUTPATIENT)
Dept: REGISTRATION | Facility: CLINIC | Age: 72
End: 2022-11-21

## 2022-11-21 DIAGNOSIS — N95.0 POSTMENOPAUSAL BLEEDING: Primary | ICD-10-CM

## 2022-11-29 ENCOUNTER — HOSPITAL ENCOUNTER (OUTPATIENT)
Dept: RADIOLOGY | Facility: CLINIC | Age: 72
Discharge: HOME | End: 2022-11-29
Attending: OBSTETRICS & GYNECOLOGY
Payer: MEDICARE

## 2022-11-29 DIAGNOSIS — N95.0 POSTMENOPAUSAL BLEEDING: ICD-10-CM

## 2022-11-29 PROCEDURE — 76856 US EXAM PELVIC COMPLETE: CPT

## 2022-12-30 ENCOUNTER — TRANSCRIBE ORDERS (OUTPATIENT)
Dept: SCHEDULING | Age: 72
End: 2022-12-30

## 2022-12-30 ENCOUNTER — TRANSCRIBE ORDERS (OUTPATIENT)
Dept: SCHEDULING | Facility: REHABILITATION | Age: 72
End: 2022-12-30

## 2022-12-30 DIAGNOSIS — N20.0 CALCULUS OF KIDNEY: Primary | ICD-10-CM

## 2022-12-30 DIAGNOSIS — N32.81 OAB (OVERACTIVE BLADDER): Primary | ICD-10-CM

## 2023-01-03 ENCOUNTER — HOSPITAL ENCOUNTER (OUTPATIENT)
Dept: RADIOLOGY | Age: 73
Discharge: HOME | End: 2023-01-03
Attending: UROLOGY
Payer: MEDICARE

## 2023-01-03 DIAGNOSIS — N20.0 CALCULUS OF KIDNEY: ICD-10-CM

## 2023-01-03 PROCEDURE — 74176 CT ABD & PELVIS W/O CONTRAST: CPT

## 2023-02-14 ENCOUNTER — HOSPITAL ENCOUNTER (OUTPATIENT)
Dept: PHYSICAL THERAPY | Age: 73
Setting detail: THERAPIES SERIES
Discharge: HOME | End: 2023-02-14
Attending: UROLOGY
Payer: MEDICARE

## 2023-02-14 DIAGNOSIS — N32.81 OAB (OVERACTIVE BLADDER): Primary | ICD-10-CM

## 2023-02-14 PROCEDURE — 97162 PT EVAL MOD COMPLEX 30 MIN: CPT | Mod: GP

## 2023-02-14 PROCEDURE — 97110 THERAPEUTIC EXERCISES: CPT | Mod: GP

## 2023-02-14 NOTE — OP PT TREATMENT LOG
Pt is a 72 y.o. F with complaint of pelvic pressure and heaviness, using a pessary current; and complaints of daily UI.  Feels like sitting on a “ball” when not wearing a pessary. Using pessary right now and for last 1 year, with Dr. Mcdermott. Some bloody discharge prior to pessary, only noted one time since pessary in place.  Had recent US which was negative. Pt wants to avoid surgery for now.     Bladder: Comment:            Urination frequency Likely WNL, thinks about 8x/day   Urgency:  Can be urgent   Incontinence UUI first in the AM sometimes, maybe with running errands away from home    NAOMI with coughing/sneezing; lifting something heavy;   Usually a small amount of leak unless lifting something heavy    No UI stairs, walking, bed mobility   Pads Liner mult/day, will change when damp   Nocturia 5 hours without need to void, only sleeps 5 hours   Pain None   Emptying Complete  Pessary helped with starting the stream   Prolapse symptoms  Minimal with pessary, sometimes a discomfort, feels like she needs a break   Liquid consumption 3-4 quarts water  Decaf coffee in AM   UTI history    Irritants          Bowel: Comment:        Frequency Every other day   Urge WNL   Incontinence Avoids eggplant and food irritants   Emptying Complete, no pushing/straining   [place] stool smooth   Fiber Well rounded, fruits>veggies   Management Apricots   Pain None current    No bleeding current     OBGYN Comment:        Pregnancies 3   Births 3   Birth type Vaginal   Tears/ episiotomies Episiotomy x3   Surgery B ovaries removed due to cyst  2012  appendectomy   Menstruation Menopause    Last baby was 9#               Sexual Activity Comment:        Type None current     Pain None historically   Orgasm    Masturbation    Libido              Pain Comment:        L LBP on occasion 1/10 today   R shoulder/arm pain Has aggravated this with exercises in the past               Other Comment:        Physical activity  Walking 4-5  days/wk  [facility] on occasion  Free weights on arms   PLOF    Living environment withhusband   Other osteoporosis   Sleep Fall asleep in recliner  5 hours only  cpap             Systems Review:   Cord questions:  Pins and needles or tingling in both arms and both legs at the same time? denies  Problems with stumbling or falling? Denies     Cauda equina questions:  Problems with bowel and bladder control? Specifically retention? See above  Pins and needles or numbness in the saddle area? Denies     Review of systems:  General - (chills, night sweats, recent infection, fever, weight loss/gain, unexplained night pain, excessive fatigue): Denies  Do you have a history of cancer? Denies  Gastrointestinal system - (abdominal pain, bowel changes, nausea, vomiting, bloating): Denies  Cardiovascular system - (chest pain, palpitations, orthopnea, other): Denies  Respiratory system - (cough, SOB, sputum production, other): Denies  Musculoskeletal system: osteoporosis, vertebral fracture? Denies  Endocrine - (polyuria, polydipsia, heat or cold intolerance, other): Denies  Neurological - (numbness/tingling, falling/stumbling, HA, dizziness, diplopia, dysphagia/dysarthria, double vision, tinnitus, memory, drop attacks, other): Denies  Any long-term steroid use? Denies      Patient goals - reduce UI    OBJECTIVE FINDINGS:      ASSESSMENT   PELVIS/POSTURE            LUMBAR AROM    Flexion    Extension    rotation    Sidebending        HIP ROM    ER    IR    Flexion    Extension    Ankle ROM    Lower Extremity Strength    hip flexion    hip extension    Hip IR    Hip ER    Hip abduction    Knee    Ankle            SPECIAL TESTS    Hamstring length    Hip FADIR    Hip SCOUR    Trendelenburg    Slump test    SLR test    BOZENA            LUMBAR PALPATION    HIP PALPATION    OTHER PALPATION        SI JOINT TESTING     S/L Compression test    Thigh thrust test    Prone sacral thrust test    Gaenslen test        BALANCE    GAIT MECHANICS  "   RUNNING MECHANICS    SQUATTING MECHANICS    LUNGING MECHANICS    BED MOBILITY    SIT TO STAND    FLOOR TO STAND      Verbal consent give for internal evaluation and treatment. yes    Pelvic Floor MMT and Function INITIAL EVALUATION F/U ASSESSMENT   Consent to eval and treat yes    Assessment position Hooklying, draped with sheet     EXTERNAL PFM EXAM     PFM OBSERVATION     Skin integrity     Contract     Relax     Bulge/pelvic drop     Cough     Perineal descent     Vaginal wall laxity     Light touch sensation     INTERNAL Assessment performed Internal vaginal     Levator Ani PERF       Power      Endurance      Repetitions      Quick Flicks      Vaginal introitus     Preet-urethra     Levator palpation     Puborectalis      Obturator Internus      Piriformis      TAILBONE          ABDOMEN     MMT strength      Diastasis Recti 1 finger width for 4\" preet-umbilicus     Pain Mild discomfort RA L>R  P! With pressure to that area following ab contraction     Breathing pattern WNL     Hip flexors No TTP    Skin integumentary/incision lines Unremarkable, well healed lap incision lines          PFDI OUTCOME MEASURE 70.8 total: 12.5 POPDI, 12.5 CRAD, 45.8 DARNELL     Adductors      Biofeedback                                                    TREATMENT LOG:  verbal consent for internal intervention: yes  Diagnosis  UI    Precautions       PT INITIAL EVALUATION:   2/14/23     PT PROGRESS NOTE:        Y/N Treatment Details: Time:   MODALITIES  26026   0 mins   Heat       Ice       THER ACT          10896   5 mins   OUTCOME MEASURES Y PFDI    POC discussion y     Review of symptoms  Tests/Measures y     Falls Screen, Medication Review, VS, pain assessment Y      Education:  Y Pelvic floor anatomy/ purpose function    Intro to POP precautions: intro to breath with movements/exercise; no pushing/straining  Weight loss goal discussion  Use of vaginal estrogen discussion    *Pt verbalized understanding of education and returned " demonstration appropriately*    Bladder Education      Bowel Education      Toilet Posture edu/demo/practice      Postural edu      HEP / HEP Review Y  eval: TAC, TAC with add    THER EX         25941   15 mins   SUPINE THEREX:   y  y TAC with exhale 10x8 sec  TAC with bolster squeeze ADD 10x5 sec    SEATED THEREX:         STANDING THEREX:        STRETCHING:               NEURO RE-ED    98646   0 mins   Diaphragmatic breathing      Postural Re-Edu            Coordination with EMG Biofeedback  Pelvic floor muscle strengthening with Promethappssavvy biofeedback unit, external sensors placed at 9 and 3 o'clock of anal opening, grounding electrode placed on ischial tuberosity:   - SUPINE:   - SEATED:  - STANDING:      Biofeedback Objective Findings  SUPINE REST:  SUPINE WORK:  SEATED REST:  SEATED WORK:  STANDING REST:  STANDING WORK:    MANUAL                 19966   0 mins   Joint Mobilization       Deep Tissue mobilizations External      Deep Tissue Mobilization Internal      IASTM/MFR/TrP Release                PLAN:   - pelvic eval - skin   - progress TE

## 2023-02-14 NOTE — Clinical Note
[address]  [facility] [address]      Dear DR. Latham,      Thank you for this referral. Please review the attached notes and plan of care for your approval.  Please contact our department with any questions.     Sincerely,     Deann Waters, PT        Referring Provider: By co-signing this Plan of Care (POC) either electronically or physically you agree to the following:    I have reviewed the the Plan of Care established by the therapist within this document and certify that the services are skilled and medically necessary. I have reviewed the plan and recommend that these services continue to meet the goals stated in this document.       EXTERNAL PROVIDER FAXING BACK:    PHYSICIAN SIGNATURE: __________________________________     DATE: ___________________  TIME: _____________    IMPORTANT:  If returning this Plan of Care by fax, please fax back ONLY the signature page.   _________________________________________________________________________      [facility] Fax: [phone]        PT EVALUATION FOR OUTPATIENT THERAPY    Patient: Georgina Briseno  MRN: [id number]  : 1950 72 y.o.   Referring Physician: Rocio Latham MD  Date of Visit: 2023      Certification Dates:  23 through 05/15/23         Recommended Frequency & Duration:  1 time/week for up to 3 months     Diagnosis:   1. OAB (overactive bladder)        Chief Complaints:   Chief Complaint   Patient presents with   • Pain   • Other     Bowel and bladder function       Precautions: no known precautions/restrictions  Precautions additional comments:      Past Medical History:   Past Medical History:   Diagnosis Date   • Arthritis     balbina. knees   • Bladder prolapse, female, acquired     hx of   • Bronchitis     hx of   • Concussion     hx of   • IBS (irritable bowel syndrome)     hx of   • Kidney stones     hx of   • Lipid disorder    • Murmur, cardiac     hx of   • Osteoporosis    • Sleep apnea         Past Surgical History:   Past Surgical History:   Procedure Laterality Date   • HERNIA REPAIR  2000    umbilical   • JOINT REPLACEMENT     • KIDNEY SURGERY      stones   • KNEE ARTHROSCOPY Bilateral    • OOPHORECTOMY Bilateral    • SKIN BIOPSY           LEARNING ASSESSMENT    Assessment completed:  Yes    Learner name:  Pat    Learner: Patient    Learning Barriers:  Learning barriers: No Barriers    Preferred Language: English     Needed: No    Education Provided:   Method: Discussion, Handout and Demonstration  Readiness: acceptance and eager  Response: Demonstrated understanding, Needs reinforcement and Verbalizes understanding      CO-LEARNER ASSESSMENT:    Completed: No            OBJECTIVE MEASUREMENTS/DATA:    Time In Session:  Start Time: 0809 (check-in time)  Stop Time: 0902  Time Calculation (min): 53 min   Assessment and Plan - 02/14/23 0813        Assessment    Plan of Care reviewed and patient/family in agreement Yes     System Pathology/Pathophysiology Noted musculoskeletal     Functional Limitations in Following Categories (PT Eval) self-care;home management;community/leisure     Rehab Potential/Prognosis good, to achieve stated therapy goals     Problem List other (see comments);decreased strength   bowel and bladder function    Clinical Assessment Pt presents to pelvic PT today for initial eval with goal of minimizing UI and potential goal of delaying POP surgery, using a pessary at present and is pleased with her symptom reduction. Pt today educated on initial HEP of core and pelvic strengthening to improve her symptoms and complaints. Pt will benefit from a pelvic PT plan of care for progression towards her bladder function goals.     Planned Services CPT 09350 Gait training;CPT 15634 Manual therapy;CPT 04826 Neuromuscular Reeducation;CPT 67070 Therapeutic activities;CPT 61457 Therapeutic exercises;CPT 20330 Electrical stimulation ATTENDED;CPT 18013 Electrical stimulation  UNATTENDED;CPT 59177 Hot/Cold Packs therapy                General Information - 02/14/23 0813        Session Details    Document Type initial evaluation     Mode of Treatment individual therapy        General Information    Referring Physician Dr. Rocio Latham MD     Patient/Family/Caregiver Comments/Observations MedHx: POP with pessary in place; 3 vaginal births with episiotomy x3; B ovaries removed due to cysts; lap appendectomy; B TKAs     Existing Precautions/Restrictions no known precautions/restrictions                Pain/Vitals - 02/14/23 0813        Pain Assessment    Currently in pain Yes     Preferred Pain Scale number (Numeric Rating Pain Scale)     Pain Side/Orientation left;lower     Pain: Body location Back     Pain Rating (0-10): Pre Activity 1        Pain Intervention    Intervention  n/a     Post Intervention Comments n/a                Falls/Food Screening - 02/14/23 0813        Initial Falls Assessment    One or more falls in the last year No                PT - 02/14/23 0813        Physical Therapy    Physical Therapy Specialty Pelvic Floor Program: Age 14+        PT Plan    Frequency of treatment 1 time/week     PT Duration 3 months     PT Cert From 02/14/23     PT Cert To 05/15/23     Date PT POC was sent to provider 02/14/23     Signed PT Plan of Care received?  No                   Outcome Measures    PT Outcome Measures - 02/14/23 0813        Other Outcome Measures Used/Comments    Other outcome measure used: PFDI 70.8 total                  Goals        Patient Stated    •  <enter goal here> (pt-stated)        Other    •  Mutually agreed upon pain goal       Mutually agreed upon pain goal: n/a    PATIENT STATED: reduce UI      •  Pelvic PT Goals       Pt will be I with initial pain management strategies and PFM relaxation/coordination/strengthening exercises to improve pt's bladder function: 3 wks    Pt will be able to reduce urinary freq to 3 hours ave, 90% of the time, for improved  bladder function and attention to work/errand task: 6 wks    Pt will be able to successfully demonstrate urge suppression strategies to delay urinary urgency for 5 min, 90% of the time, for improved bladder function and attention to errand task: 8 wks    Pt will be I with diaphragmatic breathing for reducing tension, stress, pelvic pain complaints: 4 wks    Pt will demonstrate the knack with cough/laugh/sneeze, 90% of the time, to reduce risk of urinary incontinence: 8 wks    Pt will report urinary leakage no greater than 2x/day, for improved bladder function: 8 wks    Pt will note 50% reduction in pad wetness, indicating improvement in UI: 12 wks    Pt will report urinary leakage no greater than 1x/wk, for improved bladder function: 12 wks    PFDI score will improve to below 55.8, indicating improvements in bladder function and QOL (70.8 on IE): 12 wks     Pt will be I with progression of PFM strengthening exercises to maintain/improve strength and bladder function: 12 wks                TREATMENT PLAN:    Pt is a 72 y.o. F with complaint of pelvic pressure and heaviness, using a pessary current; and complaints of daily UI.  Feels like sitting on a “ball” when not wearing a pessary. Using pessary right now and for last 1 year, with Dr. Mcdermott. Some bloody discharge prior to pessary, only noted one time since pessary in place.  Had recent US which was negative. Pt wants to avoid surgery for now.     Bladder: Comment:            Urination frequency Likely WNL, thinks about 8x/day   Urgency:  Can be urgent   Incontinence UUI first in the AM sometimes, maybe with running errands away from home    NAOMI with coughing/sneezing; lifting something heavy;   Usually a small amount of leak unless lifting something heavy    No UI stairs, walking, bed mobility   Pads Liner mult/day, will change when damp   Nocturia 5 hours without need to void, only sleeps 5 hours   Pain None   Emptying Complete  Pessary helped with starting the  stream   Prolapse symptoms  Minimal with pessary, sometimes a discomfort, feels like she needs a break   Liquid consumption 3-4 quarts water  Decaf coffee in AM   UTI history    Irritants          Bowel: Comment:        Frequency Every other day   Urge WNL   Incontinence Avoids eggplant and food irritants   Emptying Complete, no pushing/straining   Jewell stool smooth   Fiber Well rounded, fruits>veggies   Management Apricots   Pain None current    No bleeding current     OBGYN Comment:        Pregnancies 3   Births 3   Birth type Vaginal   Tears/ episiotomies Episiotomy x3   Surgery B ovaries removed due to cyst  2012  appendectomy   Menstruation Menopause    Last baby was 9#               Sexual Activity Comment:        Type None current     Pain None historically   Orgasm    Masturbation    Libido              Pain Comment:        L LBP on occasion 1/10 today   R shoulder/arm pain Has aggravated this with exercises in the past               Other Comment:        Physical activity  Walking 4-5 days/wk  [facility] on occasion  Free weights on arms   PLOF    Living environment withhusband   Other osteoporosis   Sleep Fall asleep in recliner  5 hours only  cpap             Systems Review:   Cord questions:  Pins and needles or tingling in both arms and both legs at the same time? denies  Problems with stumbling or falling? Denies     Cauda equina questions:  Problems with bowel and bladder control? Specifically retention? See above  Pins and needles or numbness in the saddle area? Denies     Review of systems:  General - (chills, night sweats, recent infection, fever, weight loss/gain, unexplained night pain, excessive fatigue): Denies  Do you have a history of cancer? Denies  Gastrointestinal system - (abdominal pain, bowel changes, nausea, vomiting, bloating): Denies  Cardiovascular system - (chest pain, palpitations, orthopnea, other): Denies  Respiratory system - (cough, SOB, sputum production, other):  "Denies  Musculoskeletal system: osteoporosis, vertebral fracture? Denies  Endocrine - (polyuria, polydipsia, heat or cold intolerance, other): Denies  Neurological - (numbness/tingling, falling/stumbling, HA, dizziness, diplopia, dysphagia/dysarthria, double vision, tinnitus, memory, drop attacks, other): Denies  Any long-term steroid use? Denies      Patient goals - reduce UI    OBJECTIVE FINDINGS:      ASSESSMENT   PELVIS/POSTURE            LUMBAR AROM    Flexion    Extension    rotation    Sidebending        HIP ROM    ER    IR    Flexion    Extension    Ankle ROM    Lower Extremity Strength    hip flexion    hip extension    Hip IR    Hip ER    Hip abduction    Knee    Ankle            SPECIAL TESTS    Hamstring length    Hip FADIR    Hip SCOUR    Trendelenburg    Slump test    SLR test    BOZENA            LUMBAR PALPATION    HIP PALPATION    OTHER PALPATION        SI JOINT TESTING     S/L Compression test    Thigh thrust test    Prone sacral thrust test    Gaenslen test        BALANCE    GAIT MECHANICS    RUNNING MECHANICS    SQUATTING MECHANICS    LUNGING MECHANICS    BED MOBILITY    SIT TO STAND    FLOOR TO STAND      Verbal consent give for internal evaluation and treatment. yes    Pelvic Floor MMT and Function INITIAL EVALUATION F/U ASSESSMENT   Consent to eval and treat yes    Assessment position Hooklying, draped with sheet     EXTERNAL PFM EXAM     PFM OBSERVATION     Skin integrity     Contract     Relax     Bulge/pelvic drop     Cough     Perineal descent     Vaginal wall laxity     Light touch sensation     INTERNAL Assessment performed Internal vaginal     Levator Ani PERF       Power      Endurance      Repetitions      Quick Flicks      Vaginal introitus     Preet-urethra     Levator palpation     Puborectalis      Obturator Internus      Piriformis      TAILBONE          ABDOMEN     MMT strength      Diastasis Recti 1 finger width for 4\" preet-umbilicus     Pain Mild discomfort RA L>R  P! With " pressure to that area following ab contraction     Breathing pattern WNL     Hip flexors No TTP    Skin integumentary/incision lines Unremarkable, well healed lap incision lines          PFDI OUTCOME MEASURE 70.8 total: 12.5 POPDI, 12.5 CRAD, 45.8 DARNELL     Adductors      Biofeedback                                                    TREATMENT LOG:  verbal consent for internal intervention: yes  Diagnosis  UI    Precautions       PT INITIAL EVALUATION:   2/14/23     PT PROGRESS NOTE:        Y/N Treatment Details: Time:   MODALITIES  56366   0 mins   Heat       Ice       THER ACT          96088   5 mins   OUTCOME MEASURES Y PFDI    POC discussion y     Review of symptoms  Tests/Measures y     Falls Screen, Medication Review, VS, pain assessment Y      Education:  Y Pelvic floor anatomy/ purpose function    Intro to POP precautions: intro to breath with movements/exercise; no pushing/straining  Weight loss goal discussion  Use of vaginal estrogen discussion    *Pt verbalized understanding of education and returned demonstration appropriately*    Bladder Education      Bowel Education      Toilet Posture edu/demo/practice      Postural edu      HEP / HEP Review Y  eval: TAC, TAC with add    THER EX         22481   15 mins   SUPINE THEREX:   y  y TAC with exhale 10x8 sec  TAC with bolster squeeze ADD 10x5 sec    SEATED THEREX:         STANDING THEREX:        STRETCHING:               NEURO RE-ED    76755   0 mins   Diaphragmatic breathing      Postural Re-Edu            Coordination with EMG Biofeedback  Pelvic floor muscle strengthening with [employer] biofeedback unit, external sensors placed at 9 and 3 o'clock of anal opening, grounding electrode placed on ischial tuberosity:   - SUPINE:   - SEATED:  - STANDING:      Biofeedback Objective Findings  SUPINE REST:  SUPINE WORK:  SEATED REST:  SEATED WORK:  STANDING REST:  STANDING WORK:    MANUAL                 46807   0 mins   Joint Mobilization       Deep Tissue  mobilizations External      Deep Tissue Mobilization Internal      IASTM/MFR/TrP Release                PLAN:   - pelvic eval - skin   - progress TE              ASSESSMENT:    This 72 y.o. year old female presents to PT with above stated diagnosis. Physical Therapy evaluation reveals other (see comments), decreased strength (bowel and bladder function) resulting in self-care, home management, community/leisure limitations. Em Briseno will benefit from skilled PT services to address limitation, work towards rehab and patient goals and maximize PLOF of chosen ADLs.     Planned Services: The patient's treatment will include CPT 71466 Gait training, CPT 71593 Manual therapy, CPT 03985 Neuromuscular Reeducation, CPT 34799 Therapeutic activities, CPT 39982 Therapeutic exercises, CPT 85753 Electrical stimulation ATTENDED, CPT 92538 Electrical stimulation UNATTENDED, CPT 16112 Hot/Cold Packs therapy, .

## 2023-02-14 NOTE — PROGRESS NOTES
Referring Provider: By co-signing this Plan of Care (POC) either electronically or physically you agree to the following:    I have reviewed the the Plan of Care established by the therapist within this document and certify that the services are skilled and medically necessary. I have reviewed the plan and recommend that these services continue to meet the goals stated in this document.       EXTERNAL PROVIDER FAXING BACK:    PHYSICIAN SIGNATURE: __________________________________     DATE: ___________________  TIME: _____________    IMPORTANT:  If returning this Plan of Care by fax, please fax back ONLY the signature page.   _________________________________________________________________________      [facility] Fax: [phone]        PT EVALUATION FOR OUTPATIENT THERAPY    Patient: Georgina Briseno  MRN: [id number]  : 1950 72 y.o.   Referring Physician: Rocio Latham MD  Date of Visit: 2023      Certification Dates:  23 through 05/15/23         Recommended Frequency & Duration:  1 time/week for up to 3 months     Diagnosis:   1. OAB (overactive bladder)        Chief Complaints:   Chief Complaint   Patient presents with   • Pain   • Other     Bowel and bladder function       Precautions: no known precautions/restrictions  Precautions additional comments:      Past Medical History:   Past Medical History:   Diagnosis Date   • Arthritis     balbina. knees   • Bladder prolapse, female, acquired     hx of   • Bronchitis     hx of   • Concussion     hx of   • IBS (irritable bowel syndrome)     hx of   • Kidney stones     hx of   • Lipid disorder    • Murmur, cardiac     hx of   • Osteoporosis    • Sleep apnea        Past Surgical History:   Past Surgical History:   Procedure Laterality Date   • HERNIA REPAIR      umbilical   • JOINT REPLACEMENT     • KIDNEY SURGERY      stones   • KNEE ARTHROSCOPY Bilateral    • OOPHORECTOMY Bilateral    • SKIN BIOPSY           LEARNING  ASSESSMENT    Assessment completed:  Yes    Learner name:  Georgina    Learner: Patient    Learning Barriers:  Learning barriers: No Barriers    Preferred Language: English     Needed: No    Education Provided:   Method: Discussion, Handout and Demonstration  Readiness: acceptance and eager  Response: Demonstrated understanding, Needs reinforcement and Verbalizes understanding      CO-LEARNER ASSESSMENT:    Completed: No            OBJECTIVE MEASUREMENTS/DATA:    Time In Session:  Start Time: 0809 (check-in time)  Stop Time: 0902  Time Calculation (min): 53 min   Assessment and Plan - 02/14/23 0813        Assessment    Plan of Care reviewed and patient/family in agreement Yes     System Pathology/Pathophysiology Noted musculoskeletal     Functional Limitations in Following Categories (PT Eval) self-care;home management;community/leisure     Rehab Potential/Prognosis good, to achieve stated therapy goals     Problem List other (see comments);decreased strength   bowel and bladder function    Clinical Assessment Pt presents to pelvic PT today for initial eval with goal of minimizing UI and potential goal of delaying POP surgery, using a pessary at present and is pleased with her symptom reduction. Pt today educated on initial HEP of core and pelvic strengthening to improve her symptoms and complaints. Pt will benefit from a pelvic PT plan of care for progression towards her bladder function goals.     Planned Services CPT 00514 Gait training;CPT 87367 Manual therapy;CPT 18723 Neuromuscular Reeducation;CPT 22817 Therapeutic activities;CPT 14318 Therapeutic exercises;CPT 06064 Electrical stimulation ATTENDED;CPT 67257 Electrical stimulation UNATTENDED;CPT 28072 Hot/Cold Packs therapy                General Information - 02/14/23 0813        Session Details    Document Type initial evaluation     Mode of Treatment individual therapy        General Information    Referring Physician Dr. Rocio Latham MD      Patient/Family/Caregiver Comments/Observations MedHx: POP with pessary in place; 3 vaginal births with episiotomy x3; B ovaries removed due to cysts; lap appendectomy; B TKAs     Existing Precautions/Restrictions no known precautions/restrictions                Pain/Vitals - 02/14/23 0813        Pain Assessment    Currently in pain Yes     Preferred Pain Scale number (Numeric Rating Pain Scale)     Pain Side/Orientation left;lower     Pain: Body location Back     Pain Rating (0-10): Pre Activity 1        Pain Intervention    Intervention  n/a     Post Intervention Comments n/a                Falls/Food Screening - 02/14/23 0813        Initial Falls Assessment    One or more falls in the last year No                PT - 02/14/23 0813        Physical Therapy    Physical Therapy Specialty Pelvic Floor Program: Age 14+        PT Plan    Frequency of treatment 1 time/week     PT Duration 3 months     PT Cert From 02/14/23     PT Cert To 05/15/23     Date PT POC was sent to provider 02/14/23     Signed PT Plan of Care received?  No                   Outcome Measures    PT Outcome Measures - 02/14/23 0813        Other Outcome Measures Used/Comments    Other outcome measure used: PFDI 70.8 total                  Goals        Patient Stated    •  <enter goal here> (pt-stated)        Other    •  Mutually agreed upon pain goal       Mutually agreed upon pain goal: n/a    PATIENT STATED: reduce UI      •  Pelvic PT Goals       Pt will be I with initial pain management strategies and PFM relaxation/coordination/strengthening exercises to improve pt's bladder function: 3 wks    Pt will be able to reduce urinary freq to 3 hours ave, 90% of the time, for improved bladder function and attention to work/errand task: 6 wks    Pt will be able to successfully demonstrate urge suppression strategies to delay urinary urgency for 5 min, 90% of the time, for improved bladder function and attention to errand task: 8 wks    Pt will be I with  diaphragmatic breathing for reducing tension, stress, pelvic pain complaints: 4 wks    Pt will demonstrate the knack with cough/laugh/sneeze, 90% of the time, to reduce risk of urinary incontinence: 8 wks    Pt will report urinary leakage no greater than 2x/day, for improved bladder function: 8 wks    Pt will note 50% reduction in pad wetness, indicating improvement in UI: 12 wks    Pt will report urinary leakage no greater than 1x/wk, for improved bladder function: 12 wks    PFDI score will improve to below 55.8, indicating improvements in bladder function and QOL (70.8 on IE): 12 wks     Pt will be I with progression of PFM strengthening exercises to maintain/improve strength and bladder function: 12 wks                TREATMENT PLAN:    Pt is a 72 y.o. F with complaint of pelvic pressure and heaviness, using a pessary current; and complaints of daily UI.  Feels like sitting on a “ball” when not wearing a pessary. Using pessary right now and for last 1 year, with Dr. Mcdermott. Some bloody discharge prior to pessary, only noted one time since pessary in place.  Had recent US which was negative. Pt wants to avoid surgery for now.     Bladder: Comment:            Urination frequency Likely WNL, thinks about 8x/day   Urgency:  Can be urgent   Incontinence UUI first in the AM sometimes, maybe with running errands away from home    NAOMI with coughing/sneezing; lifting something heavy;   Usually a small amount of leak unless lifting something heavy    No UI stairs, walking, bed mobility   Pads Liner mult/day, will change when damp   Nocturia 5 hours without need to void, only sleeps 5 hours   Pain None   Emptying Complete  Pessary helped with starting the stream   Prolapse symptoms  Minimal with pessary, sometimes a discomfort, feels like she needs a break   Liquid consumption 3-4 quarts water  Decaf coffee in AM   UTI history    Irritants          Bowel: Comment:        Frequency Every other day   Urge WNL   Incontinence  Avoids eggplant and food irritants   Emptying Complete, no pushing/straining   [place] stool smooth   Fiber Well rounded, fruits>veggies   Management Apricots   Pain None current    No bleeding current     OBGYN Comment:        Pregnancies 3   Births 3   Birth type Vaginal   Tears/ episiotomies Episiotomy x3   Surgery B ovaries removed due to cyst  2012  appendectomy   Menstruation Menopause    Last baby was 9#               Sexual Activity Comment:        Type None current     Pain None historically   Orgasm    Masturbation    Libido              Pain Comment:        L LBP on occasion 1/10 today   R shoulder/arm pain Has aggravated this with exercises in the past               Other Comment:        Physical activity  Walking 4-5 days/wk  [facility]  Free weights on arms   PLOF    Living environment withhusband   Other osteoporosis   Sleep Fall asleep in recliner  5 hours only  cpap             Systems Review:   Cord questions:  Pins and needles or tingling in both arms and both legs at the same time? denies  Problems with stumbling or falling? Denies     Cauda equina questions:  Problems with bowel and bladder control? Specifically retention? See above  Pins and needles or numbness in the saddle area? Denies     Review of systems:  General - (chills, night sweats, recent infection, fever, weight loss/gain, unexplained night pain, excessive fatigue): Denies  Do you have a history of cancer? Denies  Gastrointestinal system - (abdominal pain, bowel changes, nausea, vomiting, bloating): Denies  Cardiovascular system - (chest pain, palpitations, orthopnea, other): Denies  Respiratory system - (cough, SOB, sputum production, other): Denies  Musculoskeletal system: osteoporosis, vertebral fracture? Denies  Endocrine - (polyuria, polydipsia, heat or cold intolerance, other): Denies  Neurological - (numbness/tingling, falling/stumbling, HA, dizziness, diplopia, dysphagia/dysarthria, double vision, tinnitus,  "memory, drop attacks, other): Denies  Any long-term steroid use? Denies      Patient goals - reduce UI    OBJECTIVE FINDINGS:      ASSESSMENT   PELVIS/POSTURE            LUMBAR AROM    Flexion    Extension    rotation    Sidebending        HIP ROM    ER    IR    Flexion    Extension    Ankle ROM    Lower Extremity Strength    hip flexion    hip extension    Hip IR    Hip ER    Hip abduction    Knee    Ankle            SPECIAL TESTS    Hamstring length    Hip FADIR    Hip SCOUR    Trendelenburg    Slump test    SLR test    BOZENA            LUMBAR PALPATION    HIP PALPATION    OTHER PALPATION        SI JOINT TESTING     S/L Compression test    Thigh thrust test    Prone sacral thrust test    Gaenslen test        BALANCE    GAIT MECHANICS    RUNNING MECHANICS    SQUATTING MECHANICS    LUNGING MECHANICS    BED MOBILITY    SIT TO STAND    FLOOR TO STAND      Verbal consent give for internal evaluation and treatment. yes    Pelvic Floor MMT and Function INITIAL EVALUATION F/U ASSESSMENT   Consent to eval and treat yes    Assessment position Hooklying, draped with sheet     EXTERNAL PFM EXAM     PFM OBSERVATION     Skin integrity     Contract     Relax     Bulge/pelvic drop     Cough     Perineal descent     Vaginal wall laxity     Light touch sensation     INTERNAL Assessment performed Internal vaginal     Levator Ani PERF       Power      Endurance      Repetitions      Quick Flicks      Vaginal introitus     Preet-urethra     Levator palpation     Puborectalis      Obturator Internus      Piriformis      TAILBONE          ABDOMEN     MMT strength      Diastasis Recti 1 finger width for 4\" preet-umbilicus     Pain Mild discomfort RA L>R  P! With pressure to that area following ab contraction     Breathing pattern WNL     Hip flexors No TTP    Skin integumentary/incision lines Unremarkable, well healed lap incision lines          PFDI OUTCOME MEASURE 70.8 total: 12.5 POPDI, 12.5 CRAD, 45.8 DARNELL     Adductors      Biofeedback "                                                    TREATMENT LOG:  verbal consent for internal intervention: yes  Diagnosis  UI    Precautions       PT INITIAL EVALUATION:   2/14/23     PT PROGRESS NOTE:        Y/N Treatment Details: Time:   MODALITIES  49216   0 mins   Heat       Ice       THER ACT          08207   5 mins   OUTCOME MEASURES Y PFDI    POC discussion y     Review of symptoms  Tests/Measures y     Falls Screen, Medication Review, VS, pain assessment Y      Education:  Y Pelvic floor anatomy/ purpose function    Intro to POP precautions: intro to breath with movements/exercise; no pushing/straining  Weight loss goal discussion  Use of vaginal estrogen discussion    *Pt verbalized understanding of education and returned demonstration appropriately*    Bladder Education      Bowel Education      Toilet Posture edu/demo/practice      Postural edu      HEP / HEP Review Y  eval: TAC, TAC with add    THER EX         94909   15 mins   SUPINE THEREX:   y  y TAC with exhale 10x8 sec  TAC with bolster squeeze ADD 10x5 sec    SEATED THEREX:         STANDING THEREX:        STRETCHING:               NEURO RE-ED    56293   0 mins   Diaphragmatic breathing      Postural Re-Edu            Coordination with EMG Biofeedback  Pelvic floor muscle strengthening with [employer] biofeedback unit, external sensors placed at 9 and 3 o'clock of anal opening, grounding electrode placed on ischial tuberosity:   - SUPINE:   - SEATED:  - STANDING:      Biofeedback Objective Findings  SUPINE REST:  SUPINE WORK:  SEATED REST:  SEATED WORK:  STANDING REST:  STANDING WORK:    MANUAL                 07789   0 mins   Joint Mobilization       Deep Tissue mobilizations External      Deep Tissue Mobilization Internal      IASTM/MFR/TrP Release                PLAN:   - pelvic eval - skin   - progress TE              ASSESSMENT:    This 72 y.o. year old female presents to PT with above stated diagnosis. Physical Therapy evaluation reveals  other (see comments), decreased strength (bowel and bladder function) resulting in self-care, home management, community/leisure limitations. Em Briseno will benefit from skilled PT services to address limitation, work towards rehab and patient goals and maximize PLOF of chosen ADLs.     Planned Services: The patient's treatment will include CPT 48598 Gait training, CPT 59899 Manual therapy, CPT 69458 Neuromuscular Reeducation, CPT 36996 Therapeutic activities, CPT 57155 Therapeutic exercises, CPT 53297 Electrical stimulation ATTENDED, CPT 57485 Electrical stimulation UNATTENDED, CPT 35577 Hot/Cold Packs therapy, .

## 2023-02-22 ENCOUNTER — HOSPITAL ENCOUNTER (OUTPATIENT)
Dept: PHYSICAL THERAPY | Age: 73
Setting detail: THERAPIES SERIES
Discharge: HOME | End: 2023-02-22
Attending: UROLOGY
Payer: MEDICARE

## 2023-02-22 DIAGNOSIS — N32.81 OAB (OVERACTIVE BLADDER): Primary | ICD-10-CM

## 2023-02-22 PROCEDURE — 97530 THERAPEUTIC ACTIVITIES: CPT | Mod: GP

## 2023-02-22 PROCEDURE — 97110 THERAPEUTIC EXERCISES: CPT | Mod: GP

## 2023-02-22 PROCEDURE — 97112 NEUROMUSCULAR REEDUCATION: CPT | Mod: GP

## 2023-02-22 NOTE — OP PT TREATMENT LOG
Pt is a 72 y.o. F with complaint of pelvic pressure and heaviness, using a pessary current; and complaints of daily UI.  Feels like sitting on a “ball” when not wearing a pessary. Using pessary right now and for last 1 year, with Dr. Mcdermott. Some bloody discharge prior to pessary, only noted one time since pessary in place.  Had recent US which was negative. Pt wants to avoid surgery for now.     Bladder: Comment:            Urination frequency Likely WNL, thinks about 8x/day   Urgency:  Can be urgent   Incontinence UUI first in the AM sometimes, maybe with running errands away from home    NAOMI with coughing/sneezing; lifting something heavy;   Usually a small amount of leak unless lifting something heavy    No UI stairs, walking, bed mobility   Pads Liner mult/day, will change when damp   Nocturia 5 hours without need to void, only sleeps 5 hours   Pain None   Emptying Complete  Pessary helped with starting the stream   Prolapse symptoms  Minimal with pessary, sometimes a discomfort, feels like she needs a break   Liquid consumption 3-4 quarts water  Decaf coffee in AM   UTI history    Irritants          Bowel: Comment:        Frequency Every other day   Urge WNL   Incontinence Avoids eggplant and food irritants   Emptying Complete, no pushing/straining   [place] stool smooth   Fiber Well rounded, fruits>veggies   Management Apricots   Pain None current    No bleeding current     OBGYN Comment:        Pregnancies 3   Births 3   Birth type Vaginal   Tears/ episiotomies Episiotomy x3   Surgery B ovaries removed due to cyst  2012  appendectomy   Menstruation Menopause    Last baby was 9#               Sexual Activity Comment:        Type None current     Pain None historically   Orgasm    Masturbation    Libido              Pain Comment:        L LBP on occasion 1/10 today   R shoulder/arm pain Has aggravated this with exercises in the past               Other Comment:        Physical activity  Walking 4-5  days/wk  [facility]  Free weights on arms   PLOF    Living environment withhusband   Other osteoporosis   Sleep Fall asleep in recliner  5 hours only  cpap             Systems Review:   Cord questions:  Pins and needles or tingling in both arms and both legs at the same time? denies  Problems with stumbling or falling? Denies     Cauda equina questions:  Problems with bowel and bladder control? Specifically retention? See above  Pins and needles or numbness in the saddle area? Denies     Review of systems:  General - (chills, night sweats, recent infection, fever, weight loss/gain, unexplained night pain, excessive fatigue): Denies  Do you have a history of cancer? Denies  Gastrointestinal system - (abdominal pain, bowel changes, nausea, vomiting, bloating): Denies  Cardiovascular system - (chest pain, palpitations, orthopnea, other): Denies  Respiratory system - (cough, SOB, sputum production, other): Denies  Musculoskeletal system: osteoporosis, vertebral fracture? Denies  Endocrine - (polyuria, polydipsia, heat or cold intolerance, other): Denies  Neurological - (numbness/tingling, falling/stumbling, HA, dizziness, diplopia, dysphagia/dysarthria, double vision, tinnitus, memory, drop attacks, other): Denies  Any long-term steroid use? Denies      Patient goals - reduce UI    OBJECTIVE FINDINGS:      ASSESSMENT   PELVIS/POSTURE            LUMBAR AROM    Flexion    Extension    rotation    Sidebending        HIP ROM    ER    IR    Flexion    Extension    Ankle ROM    Lower Extremity Strength    hip flexion    hip extension    Hip IR    Hip ER    Hip abduction    Knee    Ankle            SPECIAL TESTS    Hamstring length    Hip FADIR    Hip SCOUR    Trendelenburg    Slump test    SLR test    BOZENA            LUMBAR PALPATION    HIP PALPATION    OTHER PALPATION        SI JOINT TESTING     S/L Compression test    Thigh thrust test    Prone sacral thrust test    Gaenslen test        BALANCE    GAIT MECHANICS  "   RUNNING MECHANICS    SQUATTING MECHANICS    LUNGING MECHANICS    BED MOBILITY    SIT TO STAND    FLOOR TO STAND      Verbal consent give for internal evaluation and treatment. yes    Pelvic Floor MMT and Function INITIAL EVALUATION  2/22/23 pelvic eval F/U ASSESSMENT   Consent to eval and treat yes    Assessment position Hooklying, draped with sheet     EXTERNAL PFM EXAM No TTP B    PFM OBSERVATION     Skin integrity Atrophy, minor presence of labia minora    Contract Present, coordinates with breathing    Relax present    Bulge/pelvic drop present    Cough present    Perineal descent none    Vaginal wall laxity Pessary in place - did not assess    Light touch sensation intact    INTERNAL Assessment performed Internal vaginal     Levator Ani PERF       Power 3/5     Endurance 5     Repetitions 2 - well coordinated with breathing     Quick Flicks      Vaginal introitus loose    Preet-urethra No TTP    Levator palpation Min TTP  Dryness sensation  Mod tension    Puborectalis      Obturator Internus      Piriformis      TAILBONE          ABDOMEN     MMT strength      Diastasis Recti 1 finger width for 4\" preet-umbilicus     Pain Mild discomfort RA L>R  P! With pressure to that area following ab contraction     Breathing pattern WNL     Hip flexors No TTP    Skin integumentary/incision lines Unremarkable, well healed lap incision lines          PFDI OUTCOME MEASURE 70.8 total: 12.5 POPDI, 12.5 CRAD, 45.8 DARNELL     Adductors      Biofeedback                                                    TREATMENT LOG:  verbal consent for internal intervention: yes  Diagnosis  UI    Precautions       PT INITIAL EVALUATION:   2/14/23     PT PROGRESS NOTE:        Y/N Treatment Details: Time:   MODALITIES  20735   0 mins   Heat       Ice       THER ACT          00779   25 mins   OUTCOME MEASURES  PFDI    POC discussion      Review of symptoms  Tests/Measures      Falls Screen, Medication Review, VS, pain assessment Y      Education:  " Y    y    h  y Pelvic floor anatomy/ purpose function    Intro to POP precautions: intro to breath with movements/exercise; no pushing/straining  Weight loss goal discussion  Use of vaginal estrogen discussion - consider topical coconut oil vs replens vs reveree    *Pt verbalized understanding of education and returned demonstration appropriately*    Bladder Education      Bowel Education      Toilet Posture edu/demo/practice      Postural edu y Squatting mechanics - bend from knees and hips; artificial knees may feel mild discomfort but should not have pain    HEP / HEP Review Y  eval: TAC, TAC with add  2/22/23: bridge, hip abd, wall squat    THER EX         01317   19 mins   SUPINE THEREX:   y  y TAC with exhale 10x8 sec  TAC with bolster squeeze ADD 10x5 sec  TAC with hip abd, gtb 10x3 sec  TAC with bridge 15x3 sec, gtb at knees    SEATED THEREX:         STANDING THEREX:   y Wall squat 5x slowly with coordinated exhale    STRETCHING:   y Hamstring str in seated 30 sec B           NEURO RE-ED    23133   10 mins   Diaphragmatic breathing      Postural Re-Edu      Pelvic eval y edu on findings  Cued coordination of PFMC with exhale    Coordination with EMG Biofeedback  Pelvic floor muscle strengthening with [employer] biofeedback unit, external sensors placed at 9 and 3 o'clock of anal opening, grounding electrode placed on ischial tuberosity:   - SUPINE:   - SEATED:  - STANDING:      Biofeedback Objective Findings  SUPINE REST:  SUPINE WORK:  SEATED REST:  SEATED WORK:  STANDING REST:  STANDING WORK:    MANUAL                 40024   0 mins   Joint Mobilization       Deep Tissue mobilizations External      Deep Tissue Mobilization Internal      IASTM/MFR/TrP Release                PLAN:   - progress TE - use TRX? Practice squats

## 2023-02-22 NOTE — PROGRESS NOTES
PT DAILY NOTE FOR OUTPATIENT THERAPY    Patient: Georgina Briseno MRN: [id number]  : 1950 72 y.o.  Referring Physician: Rocio Latham MD  Date of Visit: 2023    Certification Dates: 23 through 05/15/23    Diagnosis:   1. OAB (overactive bladder)               Precautions:   Existing Precautions/Restrictions: no known precautions/restrictions      TODAY'S VISIT    Time In Session:  Start Time: 902  Stop Time: 958  Time Calculation (min): 56 min   History/Vitals/Pain/Encounter Info - 23 09        Injury History/Precautions/Daily Required Info    Document Type daily treatment     Referring Physician Dr. Rocio Latham MD     Existing Precautions/Restrictions no known precautions/restrictions     Patient/Family/Caregiver Comments/Observations Has been doing her initial exercises daily. Pt wants to get her core back in shape. Pt has mostly been walking lately. Pt says that every not and then if she bends from her back to pick something up, she may feel a discomfort in her pelvis region. Never feels like the pessary is sliding around or irritating.     Patient reported fall since last visit No        Pain Assessment    Currently in pain No/Denies        Pain Intervention    Intervention  n/a     Post Intervention Comments n/a                Daily Treatment Assessment and Plan - 23        Daily Treatment Assessment and Plan    Progress toward goals Progressing     Daily Outcome Summary Reviewed squat mechanics for minimizing and preventing POP worsening. Progressed TE to include greater core/pelvic hybrid strengthening exercises. Good PFM coordination noted today, with breathing. Evidence of tissue atrophy that may cause some of her discomfort, and discussed use of topical moisturizers and topical estrogen.                         Today's Treatment:    Pt is a 72 y.o. F with complaint of pelvic pressure and heaviness, using a pessary current; and complaints of daily UI.  Feels  like sitting on a “ball” when not wearing a pessary. Using pessary right now and for last 1 year, with Dr. Mcdermott. Some bloody discharge prior to pessary, only noted one time since pessary in place.  Had recent US which was negative. Pt wants to avoid surgery for now.     Bladder: Comment:            Urination frequency Likely WNL, thinks about 8x/day   Urgency:  Can be urgent   Incontinence UUI first in the AM sometimes, maybe with running errands away from home    NAOMI with coughing/sneezing; lifting something heavy;   Usually a small amount of leak unless lifting something heavy    No UI stairs, walking, bed mobility   Pads Liner mult/day, will change when damp   Nocturia 5 hours without need to void, only sleeps 5 hours   Pain None   Emptying Complete  Pessary helped with starting the stream   Prolapse symptoms  Minimal with pessary, sometimes a discomfort, feels like she needs a break   Liquid consumption 3-4 quarts water  Decaf coffee in AM   UTI history    Irritants          Bowel: Comment:        Frequency Every other day   Urge WNL   Incontinence Avoids eggplant and food irritants   Emptying Complete, no pushing/straining   [place] stool smooth   Fiber Well rounded, fruits>veggies   Management Apricots   Pain None current    No bleeding current     OBGYN Comment:        Pregnancies 3   Births 3   Birth type Vaginal   Tears/ episiotomies Episiotomy x3   Surgery B ovaries removed due to cyst  2012  appendectomy   Menstruation Menopause    Last baby was 9#               Sexual Activity Comment:        Type None current     Pain None historically   Orgasm    Masturbation    Libido              Pain Comment:        L LBP on occasion 1/10 today   R shoulder/arm pain Has aggravated this with exercises in the past               Other Comment:        Physical activity  Walking 4-5 days/wk  [facility]  Free weights on arms   PLOF    Living environment withhusband   Other osteoporosis   Sleep Fall asleep in  recliner  5 hours only  cpap             Systems Review:   Cord questions:  Pins and needles or tingling in both arms and both legs at the same time? denies  Problems with stumbling or falling? Denies     Cauda equina questions:  Problems with bowel and bladder control? Specifically retention? See above  Pins and needles or numbness in the saddle area? Denies     Review of systems:  General - (chills, night sweats, recent infection, fever, weight loss/gain, unexplained night pain, excessive fatigue): Denies  Do you have a history of cancer? Denies  Gastrointestinal system - (abdominal pain, bowel changes, nausea, vomiting, bloating): Denies  Cardiovascular system - (chest pain, palpitations, orthopnea, other): Denies  Respiratory system - (cough, SOB, sputum production, other): Denies  Musculoskeletal system: osteoporosis, vertebral fracture? Denies  Endocrine - (polyuria, polydipsia, heat or cold intolerance, other): Denies  Neurological - (numbness/tingling, falling/stumbling, HA, dizziness, diplopia, dysphagia/dysarthria, double vision, tinnitus, memory, drop attacks, other): Denies  Any long-term steroid use? Denies      Patient goals - reduce UI    OBJECTIVE FINDINGS:      ASSESSMENT   PELVIS/POSTURE            LUMBAR AROM    Flexion    Extension    rotation    Sidebending        HIP ROM    ER    IR    Flexion    Extension    Ankle ROM    Lower Extremity Strength    hip flexion    hip extension    Hip IR    Hip ER    Hip abduction    Knee    Ankle            SPECIAL TESTS    Hamstring length    Hip FADIR    Hip SCOUR    Trendelenburg    Slump test    SLR test    BOZENA            LUMBAR PALPATION    HIP PALPATION    OTHER PALPATION        SI JOINT TESTING     S/L Compression test    Thigh thrust test    Prone sacral thrust test    Gaenslen test        BALANCE    GAIT MECHANICS    RUNNING MECHANICS    SQUATTING MECHANICS    LUNGING MECHANICS    BED MOBILITY    SIT TO STAND    FLOOR TO STAND      Verbal consent  "give for internal evaluation and treatment. yes    Pelvic Floor MMT and Function INITIAL EVALUATION  2/22/23 pelvic eval F/U ASSESSMENT   Consent to eval and treat yes    Assessment position Hooklying, draped with sheet     EXTERNAL PFM EXAM No TTP B    PFM OBSERVATION     Skin integrity Atrophy, minor presence of labia minora    Contract Present, coordinates with breathing    Relax present    Bulge/pelvic drop present    Cough present    Perineal descent none    Vaginal wall laxity Pessary in place - did not assess    Light touch sensation intact    INTERNAL Assessment performed Internal vaginal     Levator Ani PERF       Power 3/5     Endurance 5     Repetitions 2 - well coordinated with breathing     Quick Flicks      Vaginal introitus loose    Preet-urethra No TTP    Levator palpation Min TTP  Dryness sensation  Mod tension    Puborectalis      Obturator Internus      Piriformis      TAILBONE          ABDOMEN     MMT strength      Diastasis Recti 1 finger width for 4\" preet-umbilicus     Pain Mild discomfort RA L>R  P! With pressure to that area following ab contraction     Breathing pattern WNL     Hip flexors No TTP    Skin integumentary/incision lines Unremarkable, well healed lap incision lines          PFDI OUTCOME MEASURE 70.8 total: 12.5 POPDI, 12.5 CRAD, 45.8 DARNELL     Adductors      Biofeedback                                                    TREATMENT LOG:  verbal consent for internal intervention: yes  Diagnosis  UI    Precautions       PT INITIAL EVALUATION:   2/14/23     PT PROGRESS NOTE:        Y/N Treatment Details: Time:   MODALITIES  54464   0 mins   Heat       Ice       THER ACT          03920   25 mins   OUTCOME MEASURES  PFDI    POC discussion      Review of symptoms  Tests/Measures      Falls Screen, Medication Review, VS, pain assessment Y      Education:  Y    y    h  y Pelvic floor anatomy/ purpose function    Intro to POP precautions: intro to breath with movements/exercise; no " pushing/straining  Weight loss goal discussion  Use of vaginal estrogen discussion - consider topical coconut oil vs replens vs reveree    *Pt verbalized understanding of education and returned demonstration appropriately*    Bladder Education      Bowel Education      Toilet Posture edu/demo/practice      Postural edu y Squatting mechanics - bend from knees and hips; artificial knees may feel mild discomfort but should not have pain    HEP / HEP Review Y  eval: TAC, TAC with add  2/22/23: bridge, hip abd, wall squat    THER EX         81421   15 mins   SUPINE THEREX:   y  y TAC with exhale 10x8 sec  TAC with bolster squeeze ADD 10x5 sec  TAC with hip abd, gtb 10x3 sec  TAC with bridge 15x3 sec, gtb at knees    SEATED THEREX:         STANDING THEREX:   y Wall squat 5x slowly with coordinated exhale    STRETCHING:   y Hamstring str in seated 30 sec B           NEURO RE-ED    98285   10 mins   Diaphragmatic breathing      Postural Re-Edu      Pelvic eval y edu on findings  Cued coordination of PFMC with exhale    Coordination with EMG Biofeedback  Pelvic floor muscle strengthening with [employer] biofeedback unit, external sensors placed at 9 and 3 o'clock of anal opening, grounding electrode placed on ischial tuberosity:   - SUPINE:   - SEATED:  - STANDING:      Biofeedback Objective Findings  SUPINE REST:  SUPINE WORK:  SEATED REST:  SEATED WORK:  STANDING REST:  STANDING WORK:    MANUAL                 38012   0 mins   Joint Mobilization       Deep Tissue mobilizations External      Deep Tissue Mobilization Internal      IASTM/MFR/TrP Release                PLAN:   - progress TE - use TRX? Practice squats

## 2023-02-23 NOTE — ADDENDUM NOTE
Encounter addended by: Deann Waters, PT on: 2/23/2023 11:54 AM   Actions taken: Clinical Note Signed, Flowsheet accepted, Charge Capture section accepted

## 2023-02-27 ENCOUNTER — HOSPITAL ENCOUNTER (OUTPATIENT)
Dept: PHYSICAL THERAPY | Age: 73
Setting detail: THERAPIES SERIES
Discharge: HOME | End: 2023-02-27
Attending: UROLOGY
Payer: MEDICARE

## 2023-02-27 DIAGNOSIS — N32.81 OAB (OVERACTIVE BLADDER): Primary | ICD-10-CM

## 2023-02-27 PROCEDURE — 97110 THERAPEUTIC EXERCISES: CPT | Mod: GP

## 2023-02-27 PROCEDURE — 97530 THERAPEUTIC ACTIVITIES: CPT | Mod: GP

## 2023-02-27 NOTE — PROGRESS NOTES
PT DAILY NOTE FOR OUTPATIENT THERAPY    Patient: Georgina Briseno MRN: [id number]  : 1950 72 y.o.  Referring Physician: Rocio Latham MD  Date of Visit: 2023    Certification Dates: 23 through 05/15/23    Diagnosis:   1. OAB (overactive bladder)             Precautions:   Existing Precautions/Restrictions: no known precautions/restrictions      TODAY'S VISIT    Time In Session:  Start Time: 08  Stop Time: 0857  Time Calculation (min): 56 min   History/Vitals/Pain/Encounter Info - 23 1022        Injury History/Precautions/Daily Required Info    Document Type daily treatment     Referring Physician Dr. Rocio Latham MD     Existing Precautions/Restrictions no known precautions/restrictions     Patient/Family/Caregiver Comments/Observations Pt has been completing exercises mostly without complaint. Has been noting neck pain, but not sure if this occurred at the gym or with a different movement.     Patient reported fall since last visit No        Pain Assessment    Currently in pain No/Denies        Pain Intervention    Intervention  education, therex, postural edu     Post Intervention Comments see assessment                Daily Treatment Assessment and Plan - 23 1022        Daily Treatment Assessment and Plan    Progress toward goals Progressing     Daily Outcome Summary Review of therex for PFM strengthening and progressed today. Added postural awareness for pain prevention but also to minimize risk of worsening prolapse with additional truncal pressue and force. Pt agreeable to progression and was assigned HEP.                     OBJECTIVE DATA TAKEN TODAY:      Today's Treatment:    Pt is a 72 y.o. F with complaint of pelvic pressure and heaviness, using a pessary current; and complaints of daily UI.  Feels like sitting on a “ball” when not wearing a pessary. Using pessary right now and for last 1 year, with Dr. Mcdermott. Some bloody discharge prior to pessary, only noted one  time since pessary in place.  Had recent US which was negative. Pt wants to avoid surgery for now.     Bladder: Comment:            Urination frequency Likely WNL, thinks about 8x/day   Urgency:  Can be urgent   Incontinence UUI first in the AM sometimes, maybe with running errands away from home    NAOMI with coughing/sneezing; lifting something heavy;   Usually a small amount of leak unless lifting something heavy    No UI stairs, walking, bed mobility   Pads Liner mult/day, will change when damp   Nocturia 5 hours without need to void, only sleeps 5 hours   Pain None   Emptying Complete  Pessary helped with starting the stream   Prolapse symptoms  Minimal with pessary, sometimes a discomfort, feels like she needs a break   Liquid consumption 3-4 quarts water  Decaf coffee in AM   UTI history    Irritants          Bowel: Comment:        Frequency Every other day   Urge WNL   Incontinence Avoids eggplant and food irritants   Emptying Complete, no pushing/straining   [place] stool smooth   Fiber Well rounded, fruits>veggies   Management Apricots   Pain None current    No bleeding current     OBGYN Comment:        Pregnancies 3   Births 3   Birth type Vaginal   Tears/ episiotomies Episiotomy x3   Surgery B ovaries removed due to cyst  2012  appendectomy   Menstruation Menopause    Last baby was 9#               Sexual Activity Comment:        Type None current     Pain None historically   Orgasm    Masturbation    Libido              Pain Comment:        L LBP on occasion 1/10 today   R shoulder/arm pain Has aggravated this with exercises in the past               Other Comment:        Physical activity  Walking 4-5 days/wk  [facility]  Free weights on arms   PLOF    Living environment withhusband   Other osteoporosis   Sleep Fall asleep in recliner  5 hours only  cpap             Systems Review:   Cord questions:  Pins and needles or tingling in both arms and both legs at the same time? denies  Problems  with stumbling or falling? Denies     Cauda equina questions:  Problems with bowel and bladder control? Specifically retention? See above  Pins and needles or numbness in the saddle area? Denies     Review of systems:  General - (chills, night sweats, recent infection, fever, weight loss/gain, unexplained night pain, excessive fatigue): Denies  Do you have a history of cancer? Denies  Gastrointestinal system - (abdominal pain, bowel changes, nausea, vomiting, bloating): Denies  Cardiovascular system - (chest pain, palpitations, orthopnea, other): Denies  Respiratory system - (cough, SOB, sputum production, other): Denies  Musculoskeletal system: osteoporosis, vertebral fracture? Denies  Endocrine - (polyuria, polydipsia, heat or cold intolerance, other): Denies  Neurological - (numbness/tingling, falling/stumbling, HA, dizziness, diplopia, dysphagia/dysarthria, double vision, tinnitus, memory, drop attacks, other): Denies  Any long-term steroid use? Denies      Patient goals - reduce UI    OBJECTIVE FINDINGS:      ASSESSMENT   PELVIS/POSTURE            LUMBAR AROM    Flexion    Extension    rotation    Sidebending        HIP ROM    ER    IR    Flexion    Extension    Ankle ROM    Lower Extremity Strength    hip flexion    hip extension    Hip IR    Hip ER    Hip abduction    Knee    Ankle            SPECIAL TESTS    Hamstring length    Hip FADIR    Hip SCOUR    Trendelenburg    Slump test    SLR test    BOZENA            LUMBAR PALPATION    HIP PALPATION    OTHER PALPATION        SI JOINT TESTING     S/L Compression test    Thigh thrust test    Prone sacral thrust test    Gaenslen test        BALANCE    GAIT MECHANICS    RUNNING MECHANICS    SQUATTING MECHANICS    LUNGING MECHANICS    BED MOBILITY    SIT TO STAND    FLOOR TO STAND      Verbal consent give for internal evaluation and treatment. yes    Pelvic Floor MMT and Function INITIAL EVALUATION  2/22/23 pelvic eval F/U ASSESSMENT   Consent to eval and treat yes   "  Assessment position Hooklying, draped with sheet     EXTERNAL PFM EXAM No TTP B    PFM OBSERVATION     Skin integrity Atrophy, minor presence of labia minora    Contract Present, coordinates with breathing    Relax present    Bulge/pelvic drop present    Cough present    Perineal descent none    Vaginal wall laxity Pessary in place - did not assess    Light touch sensation intact    INTERNAL Assessment performed Internal vaginal     Levator Ani PERF       Power 3/5     Endurance 5     Repetitions 2 - well coordinated with breathing     Quick Flicks      Vaginal introitus loose    Preet-urethra No TTP    Levator palpation Min TTP  Dryness sensation  Mod tension    Puborectalis      Obturator Internus      Piriformis      TAILBONE          ABDOMEN     MMT strength      Diastasis Recti 1 finger width for 4\" preet-umbilicus     Pain Mild discomfort RA L>R  P! With pressure to that area following ab contraction     Breathing pattern WNL     Hip flexors No TTP    Skin integumentary/incision lines Unremarkable, well healed lap incision lines          PFDI OUTCOME MEASURE 70.8 total: 12.5 POPDI, 12.5 CRAD, 45.8 DARNELL     Adductors      Biofeedback                                                                                                                      TREATMENT LOG:  verbal consent for internal intervention: yes  Diagnosis  UI    Precautions       PT INITIAL EVALUATION:   2/14/23     PT PROGRESS NOTE:        Y/N Treatment Details: Time:   MODALITIES  82733   0 mins   Heat       Ice       THER ACT          41043   10 mins   OUTCOME MEASURES  PFDI    POC discussion      Review of symptoms  Tests/Measures      Falls Screen, Medication Review, VS, pain assessment h      Education:  h    y    h  h Pelvic floor anatomy/ purpose function    Intro to POP precautions: intro to breath with movements/exercise; no pushing/straining  Weight loss goal discussion  Use of vaginal estrogen discussion - consider topical coconut oil " vs replens vs reveree    *Pt verbalized understanding of education and returned demonstration appropriately*    Bladder Education      Bowel Education      Toilet Posture edu/demo/practice      Postural edu h      y Squatting mechanics - bend from knees and hips; artificial knees may feel mild discomfort but should not have pain    Minimize kyphosis to minimize worsening POP and pressure on the bladder    HEP / HEP Review Y  eval: TAC, TAC with add  2/22/23: bridge, hip abd, wall squat  2/27/23: KFO, shoulder row, SKTC str, piriformis str    THER EX         80946   45 mins   SUPINE THEREX:   h  y  y  y  y  y TAC with exhale 10x8 sec  TAC with bolster squeeze ADD 10x5 sec  TAC with hip abd, gtb 10x3 sec  TAC with bridge 15x3 sec, gtb at knees  TAC with march 5x3 alt reps  TAC with KFO    *all with coordinated breathing    SEATED THEREX:         STANDING THEREX:   h  y Wall squat 5x slowly with coordinated exhale  Shoulder row 20x3 sec hold    STRETCHING:   h  y  y Hamstring str in seated 30 sec B  SKTC 3x20 sec  Piriformis str 3x20 sec           NEURO RE-ED    16643   0 mins   Diaphragmatic breathing      Postural Re-Edu      Pelvic eval y edu on findings  Cued coordination of PFMC with exhale    Coordination with EMG Biofeedback  Pelvic floor muscle strengthening with [employer] biofeedback unit, external sensors placed at 9 and 3 o'clock of anal opening, grounding electrode placed on ischial tuberosity:   - SUPINE:   - SEATED:  - STANDING:      Biofeedback Objective Findings  SUPINE REST:  SUPINE WORK:  SEATED REST:  SEATED WORK:  STANDING REST:  STANDING WORK:    MANUAL                 83290   0 mins   Joint Mobilization       Deep Tissue mobilizations External      Deep Tissue Mobilization Internal      IASTM/MFR/TrP Release                PLAN:   - progress TE - use TRX? Practice squats

## 2023-02-27 NOTE — OP PT TREATMENT LOG
Pt is a 72 y.o. F with complaint of pelvic pressure and heaviness, using a pessary current; and complaints of daily UI.  Feels like sitting on a “ball” when not wearing a pessary. Using pessary right now and for last 1 year, with Dr. Mcdermott. Some bloody discharge prior to pessary, only noted one time since pessary in place.  Had recent US which was negative. Pt wants to avoid surgery for now.     Bladder: Comment:            Urination frequency Likely WNL, thinks about 8x/day   Urgency:  Can be urgent   Incontinence UUI first in the AM sometimes, maybe with running errands away from home    NAOMI with coughing/sneezing; lifting something heavy;   Usually a small amount of leak unless lifting something heavy    No UI stairs, walking, bed mobility   Pads Liner mult/day, will change when damp   Nocturia 5 hours without need to void, only sleeps 5 hours   Pain None   Emptying Complete  Pessary helped with starting the stream   Prolapse symptoms  Minimal with pessary, sometimes a discomfort, feels like she needs a break   Liquid consumption 3-4 quarts water  Decaf coffee in AM   UTI history    Irritants          Bowel: Comment:        Frequency Every other day   Urge WNL   Incontinence Avoids eggplant and food irritants   Emptying Complete, no pushing/straining   [place] stool smooth   Fiber Well rounded, fruits>veggies   Management Apricots   Pain None current    No bleeding current     OBGYN Comment:        Pregnancies 3   Births 3   Birth type Vaginal   Tears/ episiotomies Episiotomy x3   Surgery B ovaries removed due to cyst  2012  appendectomy   Menstruation Menopause    Last baby was 9#               Sexual Activity Comment:        Type None current     Pain None historically   Orgasm    Masturbation    Libido              Pain Comment:        L LBP on occasion 1/10 today   R shoulder/arm pain Has aggravated this with exercises in the past               Other Comment:        Physical activity  Walking 4-5  days/wk  [facility]  Free weights on arms   PLOF    Living environment withhusband   Other osteoporosis   Sleep Fall asleep in recliner  5 hours only  cpap             Systems Review:   Cord questions:  Pins and needles or tingling in both arms and both legs at the same time? denies  Problems with stumbling or falling? Denies     Cauda equina questions:  Problems with bowel and bladder control? Specifically retention? See above  Pins and needles or numbness in the saddle area? Denies     Review of systems:  General - (chills, night sweats, recent infection, fever, weight loss/gain, unexplained night pain, excessive fatigue): Denies  Do you have a history of cancer? Denies  Gastrointestinal system - (abdominal pain, bowel changes, nausea, vomiting, bloating): Denies  Cardiovascular system - (chest pain, palpitations, orthopnea, other): Denies  Respiratory system - (cough, SOB, sputum production, other): Denies  Musculoskeletal system: osteoporosis, vertebral fracture? Denies  Endocrine - (polyuria, polydipsia, heat or cold intolerance, other): Denies  Neurological - (numbness/tingling, falling/stumbling, HA, dizziness, diplopia, dysphagia/dysarthria, double vision, tinnitus, memory, drop attacks, other): Denies  Any long-term steroid use? Denies      Patient goals - reduce UI    OBJECTIVE FINDINGS:      ASSESSMENT   PELVIS/POSTURE            LUMBAR AROM    Flexion    Extension    rotation    Sidebending        HIP ROM    ER    IR    Flexion    Extension    Ankle ROM    Lower Extremity Strength    hip flexion    hip extension    Hip IR    Hip ER    Hip abduction    Knee    Ankle            SPECIAL TESTS    Hamstring length    Hip FADIR    Hip SCOUR    Trendelenburg    Slump test    SLR test    BOZENA            LUMBAR PALPATION    HIP PALPATION    OTHER PALPATION        SI JOINT TESTING     S/L Compression test    Thigh thrust test    Prone sacral thrust test    Gaenslen test        BALANCE    GAIT MECHANICS  "   RUNNING MECHANICS    SQUATTING MECHANICS    LUNGING MECHANICS    BED MOBILITY    SIT TO STAND    FLOOR TO STAND      Verbal consent give for internal evaluation and treatment. yes    Pelvic Floor MMT and Function INITIAL EVALUATION  2/22/23 pelvic eval F/U ASSESSMENT   Consent to eval and treat yes    Assessment position Hooklying, draped with sheet     EXTERNAL PFM EXAM No TTP B    PFM OBSERVATION     Skin integrity Atrophy, minor presence of labia minora    Contract Present, coordinates with breathing    Relax present    Bulge/pelvic drop present    Cough present    Perineal descent none    Vaginal wall laxity Pessary in place - did not assess    Light touch sensation intact    INTERNAL Assessment performed Internal vaginal     Levator Ani PERF       Power 3/5     Endurance 5     Repetitions 2 - well coordinated with breathing     Quick Flicks      Vaginal introitus loose    Preet-urethra No TTP    Levator palpation Min TTP  Dryness sensation  Mod tension    Puborectalis      Obturator Internus      Piriformis      TAILBONE          ABDOMEN     MMT strength      Diastasis Recti 1 finger width for 4\" preet-umbilicus     Pain Mild discomfort RA L>R  P! With pressure to that area following ab contraction     Breathing pattern WNL     Hip flexors No TTP    Skin integumentary/incision lines Unremarkable, well healed lap incision lines          PFDI OUTCOME MEASURE 70.8 total: 12.5 POPDI, 12.5 CRAD, 45.8 DARNELL     Adductors      Biofeedback                                                                                                                      TREATMENT LOG:  verbal consent for internal intervention: yes  Diagnosis  UI    Precautions       PT INITIAL EVALUATION:   2/14/23     PT PROGRESS NOTE:        Y/N Treatment Details: Time:   MODALITIES  05922   0 mins   Heat       Ice       THER ACT          43790   10 mins   OUTCOME MEASURES  PFDI    POC discussion      Review of symptoms  Tests/Measures      Falls " Screen, Medication Review, VS, pain assessment h      Education:  h    y    h  h Pelvic floor anatomy/ purpose function    Intro to POP precautions: intro to breath with movements/exercise; no pushing/straining  Weight loss goal discussion  Use of vaginal estrogen discussion - consider topical coconut oil vs replens vs reveree    *Pt verbalized understanding of education and returned demonstration appropriately*    Bladder Education      Bowel Education      Toilet Posture edu/demo/practice      Postural edu h      y Squatting mechanics - bend from knees and hips; artificial knees may feel mild discomfort but should not have pain    Minimize kyphosis to minimize worsening POP and pressure on the bladder    HEP / HEP Review Y  eval: TAC, TAC with add  2/22/23: bridge, hip abd, wall squat  2/27/23: KFO, shoulder row, SKTC str, piriformis str    THER EX         [id number]   45 mins   SUPINE THEREX:   h  y  y  y  y  y TAC with exhale 10x8 sec  TAC with bolster squeeze ADD 10x5 sec  TAC with hip abd, gtb 10x3 sec  TAC with bridge 15x3 sec, gtb at knees  TAC with march 5x3 alt reps  TAC with KFO    *all with coordinated breathing    SEATED THEREX:         STANDING THEREX:   h  y Wall squat 5x slowly with coordinated exhale  Shoulder row 20x3 sec hold    STRETCHING:   h  y  y Hamstring str in seated 30 sec B  SKTC 3x20 sec  Piriformis str 3x20 sec           NEURO RE-ED    46987   0 mins   Diaphragmatic breathing      Postural Re-Edu      Pelvic eval y edu on findings  Cued coordination of PFMC with exhale    Coordination with EMG Biofeedback  Pelvic floor muscle strengthening with [employer] biofeedback unit, external sensors placed at 9 and 3 o'clock of anal opening, grounding electrode placed on ischial tuberosity:   - SUPINE:   - SEATED:  - STANDING:      Biofeedback Objective Findings  SUPINE REST:  SUPINE WORK:  SEATED REST:  SEATED WORK:  STANDING REST:  STANDING WORK:    MANUAL                 78877   0 mins   Joint  Mobilization       Deep Tissue mobilizations External      Deep Tissue Mobilization Internal      IASTM/MFR/TrP Release                PLAN:   - progress TE - use TRX? Practice squats

## 2023-03-06 ENCOUNTER — HOSPITAL ENCOUNTER (OUTPATIENT)
Dept: PHYSICAL THERAPY | Age: 73
Setting detail: THERAPIES SERIES
Discharge: HOME | End: 2023-03-06
Attending: UROLOGY
Payer: MEDICARE

## 2023-03-06 DIAGNOSIS — N32.81 OAB (OVERACTIVE BLADDER): Primary | ICD-10-CM

## 2023-03-06 PROCEDURE — 97110 THERAPEUTIC EXERCISES: CPT | Mod: GP

## 2023-03-06 PROCEDURE — 97530 THERAPEUTIC ACTIVITIES: CPT | Mod: GP

## 2023-03-06 NOTE — OP PT TREATMENT LOG
Pt is a 72 y.o. F with complaint of pelvic pressure and heaviness, using a pessary current; and complaints of daily UI.  Feels like sitting on a “ball” when not wearing a pessary. Using pessary right now and for last 1 year, with Dr. Mcdermott. Some bloody discharge prior to pessary, only noted one time since pessary in place.  Had recent US which was negative. Pt wants to avoid surgery for now.     Bladder: Comment:            Urination frequency Likely WNL, thinks about 8x/day   Urgency:  Can be urgent   Incontinence UUI first in the AM sometimes, maybe with running errands away from home    NAOMI with coughing/sneezing; lifting something heavy;   Usually a small amount of leak unless lifting something heavy    No UI stairs, walking, bed mobility   Pads Liner mult/day, will change when damp   Nocturia 5 hours without need to void, only sleeps 5 hours   Pain None   Emptying Complete  Pessary helped with starting the stream   Prolapse symptoms  Minimal with pessary, sometimes a discomfort, feels like she needs a break   Liquid consumption 3-4 quarts water  Decaf coffee in AM   UTI history    Irritants          Bowel: Comment:        Frequency Every other day   Urge WNL   Incontinence Avoids eggplant and food irritants   Emptying Complete, no pushing/straining   [place] stool smooth   Fiber Well rounded, fruits>veggies   Management Apricots   Pain None current    No bleeding current     OBGYN Comment:        Pregnancies 3   Births 3   Birth type Vaginal   Tears/ episiotomies Episiotomy x3   Surgery B ovaries removed due to cyst  2012  appendectomy   Menstruation Menopause    Last baby was 9#               Sexual Activity Comment:        Type None current     Pain None historically   Orgasm    Masturbation    Libido              Pain Comment:        L LBP on occasion 1/10 today   R shoulder/arm pain Has aggravated this with exercises in the past               Other Comment:        Physical activity  Walking 4-5  days/wk  [facility]  Free weights on arms   PLOF    Living environment withhusband   Other osteoporosis   Sleep Fall asleep in recliner  5 hours only  cpap             Systems Review:   Cord questions:  Pins and needles or tingling in both arms and both legs at the same time? denies  Problems with stumbling or falling? Denies     Cauda equina questions:  Problems with bowel and bladder control? Specifically retention? See above  Pins and needles or numbness in the saddle area? Denies     Review of systems:  General - (chills, night sweats, recent infection, fever, weight loss/gain, unexplained night pain, excessive fatigue): Denies  Do you have a history of cancer? Denies  Gastrointestinal system - (abdominal pain, bowel changes, nausea, vomiting, bloating): Denies  Cardiovascular system - (chest pain, palpitations, orthopnea, other): Denies  Respiratory system - (cough, SOB, sputum production, other): Denies  Musculoskeletal system: osteoporosis, vertebral fracture? Denies  Endocrine - (polyuria, polydipsia, heat or cold intolerance, other): Denies  Neurological - (numbness/tingling, falling/stumbling, HA, dizziness, diplopia, dysphagia/dysarthria, double vision, tinnitus, memory, drop attacks, other): Denies  Any long-term steroid use? Denies      Patient goals - reduce UI    OBJECTIVE FINDINGS:      ASSESSMENT   PELVIS/POSTURE            LUMBAR AROM    Flexion    Extension    rotation    Sidebending        HIP ROM    ER    IR    Flexion    Extension    Ankle ROM    Lower Extremity Strength    hip flexion    hip extension    Hip IR    Hip ER    Hip abduction    Knee    Ankle            SPECIAL TESTS    Hamstring length    Hip FADIR    Hip SCOUR    Trendelenburg    Slump test    SLR test    BOZENA            LUMBAR PALPATION    HIP PALPATION    OTHER PALPATION        SI JOINT TESTING     S/L Compression test    Thigh thrust test    Prone sacral thrust test    Gaenslen test        BALANCE    GAIT MECHANICS  "   RUNNING MECHANICS    SQUATTING MECHANICS    LUNGING MECHANICS    BED MOBILITY    SIT TO STAND    FLOOR TO STAND      Verbal consent give for internal evaluation and treatment. yes    Pelvic Floor MMT and Function INITIAL EVALUATION  2/22/23 pelvic eval F/U ASSESSMENT   Consent to eval and treat yes    Assessment position Hooklying, draped with sheet     EXTERNAL PFM EXAM No TTP B    PFM OBSERVATION     Skin integrity Atrophy, minor presence of labia minora    Contract Present, coordinates with breathing    Relax present    Bulge/pelvic drop present    Cough present    Perineal descent none    Vaginal wall laxity Pessary in place - did not assess    Light touch sensation intact    INTERNAL Assessment performed Internal vaginal     Levator Ani PERF       Power 3/5     Endurance 5     Repetitions 2 - well coordinated with breathing     Quick Flicks      Vaginal introitus loose    Preet-urethra No TTP    Levator palpation Min TTP  Dryness sensation  Mod tension    Puborectalis      Obturator Internus      Piriformis      TAILBONE          ABDOMEN     MMT strength      Diastasis Recti 1 finger width for 4\" preet-umbilicus     Pain Mild discomfort RA L>R  P! With pressure to that area following ab contraction     Breathing pattern WNL     Hip flexors No TTP    Skin integumentary/incision lines Unremarkable, well healed lap incision lines          PFDI OUTCOME MEASURE 70.8 total: 12.5 POPDI, 12.5 CRAD, 45.8 DARNELL     Adductors      Biofeedback                                                                                                                      TREATMENT LOG:  verbal consent for internal intervention: yes  Diagnosis  UI    Precautions       PT INITIAL EVALUATION:   2/14/23     PT PROGRESS NOTE:        Y/N Treatment Details: Time:   MODALITIES  25891   0 mins   Heat       Ice       THER ACT          05112   10 mins   OUTCOME MEASURES  PFDI    POC discussion      Review of symptoms  Tests/Measures      Falls " Screen, Medication Review, VS, pain assessment h      Education:  h    h    h  h Pelvic floor anatomy/ purpose function    Intro to POP precautions: intro to breath with movements/exercise; no pushing/straining  Weight loss goal discussion  Use of vaginal estrogen discussion - consider topical coconut oil vs replens vs reveree    *Pt verbalized understanding of education and returned demonstration appropriately*    Bladder Education      Bowel Education      Toilet Posture edu/demo/practice      Postural edu y        h Squatting mechanics - bend from knees and hips; knee valgus; =WB, shift slightly to R; exhale with the lift  15x picking up bolster    Minimize kyphosis to minimize worsening POP and pressure on the bladder    HEP / HEP Review Y  eval: TAC, TAC with add  2/22/23: bridge, hip abd, wall squat  2/27/23: KFO, shoulder row, SKTC str, piriformis str    THER EX         [id number]   45 mins   SUPINE THEREX:   h  y  y  y  y  y TAC with exhale 10x8 sec  TAC with bolster squeeze ADD 10x5 sec  TAC with hip abd, gtb 10x3 sec  TAC with bridge 15x3 sec, gtb at knees  TAC with march 5x3 alt reps  TAC with KFO    *all with coordinated breathing    SEATED THEREX:   y STS 15x with exhale - cued valgus    STANDING THEREX:   h  y  y Wall squat 5x slowly with coordinated exhale  Shoulder row 20x5 sec hold  No money in from of mirror 20x5 sec    STRETCHING:   h  y  y Hamstring str in seated 30 sec B  SKTC 3x20 sec  Piriformis str 3x20 sec           NEURO RE-ED    63401   0 mins   Diaphragmatic breathing      Postural Re-Edu      Pelvic eval y edu on findings  Cued coordination of PFMC with exhale    Coordination with EMG Biofeedback  Pelvic floor muscle strengthening with [employer] biofeedback unit, external sensors placed at 9 and 3 o'clock of anal opening, grounding electrode placed on ischial tuberosity:   - SUPINE:   - SEATED:  - STANDING:      Biofeedback Objective Findings  SUPINE REST:  SUPINE WORK:  SEATED REST:  SEATED  WORK:  STANDING REST:  STANDING WORK:    MANUAL                 15036   0 mins   Joint Mobilization       Deep Tissue mobilizations External      Deep Tissue Mobilization Internal      IASTM/MFR/TrP Release                PLAN:   - progress TE - use TRX? Practice squats   - add no money and squats to HEP

## 2023-03-06 NOTE — PROGRESS NOTES
PT DAILY NOTE FOR OUTPATIENT THERAPY    Patient: Georgina Briseno MRN: [id number]  : 1950 72 y.o.  Referring Physician: Rocio Latham MD  Date of Visit: 3/6/2023    Certification Dates: 23 through 05/15/23    Diagnosis:   1. OAB (overactive bladder)             Precautions:   Existing Precautions/Restrictions: no known precautions/restrictions      TODAY'S VISIT    Time In Session:  Start Time: 904  Stop Time: 1000  Time Calculation (min): 56 min   History/Vitals/Pain/Encounter Info - 23 09        Injury History/Precautions/Daily Required Info    Document Type daily treatment     Referring Physician Dr. Rocio Latham MD     Existing Precautions/Restrictions no known precautions/restrictions     Patient/Family/Caregiver Comments/Observations Pt had one moment of neck pain this week but it went away. Pt feels that she is getting better at the exercises, thinks the bridges feel ok. Still noting primary leakage moments with bending and picking things up.     Patient reported fall since last visit No        Pain Assessment    Currently in pain No/Denies        Pain Intervention    Intervention  n/a     Post Intervention Comments n/a                Daily Treatment Assessment and Plan - 23        Daily Treatment Assessment and Plan    Progress toward goals Progressing     Daily Outcome Summary Pt educated more extensively today on core engagement with functional movements - bending, lifting, squatting - to  improve leakage with these expereinces. Pt able to demo these activities today with minimal cuing for knee valgus and bias L side wt shift. Will review and re-assess these skills at next session, and cont with lumbopelvic strengthening.                 Today's Treatment:    Pt is a 72 y.o. F with complaint of pelvic pressure and heaviness, using a pessary current; and complaints of daily UI.  Feels like sitting on a “ball” when not wearing a pessary. Using pessary right now and for  last 1 year, with Dr. Mcdermott. Some bloody discharge prior to pessary, only noted one time since pessary in place.  Had recent US which was negative. Pt wants to avoid surgery for now.     Bladder: Comment:            Urination frequency Likely WNL, thinks about 8x/day   Urgency:  Can be urgent   Incontinence UUI first in the AM sometimes, maybe with running errands away from home    NAOMI with coughing/sneezing; lifting something heavy;   Usually a small amount of leak unless lifting something heavy    No UI stairs, walking, bed mobility   Pads Liner mult/day, will change when damp   Nocturia 5 hours without need to void, only sleeps 5 hours   Pain None   Emptying Complete  Pessary helped with starting the stream   Prolapse symptoms  Minimal with pessary, sometimes a discomfort, feels like she needs a break   Liquid consumption 3-4 quarts water  Decaf coffee in AM   UTI history    Irritants          Bowel: Comment:        Frequency Every other day   Urge WNL   Incontinence Avoids eggplant and food irritants   Emptying Complete, no pushing/straining   [place] stool smooth   Fiber Well rounded, fruits>veggies   Management Apricots   Pain None current    No bleeding current     OBGYN Comment:        Pregnancies 3   Births 3   Birth type Vaginal   Tears/ episiotomies Episiotomy x3   Surgery B ovaries removed due to cyst  2012  appendectomy   Menstruation Menopause    Last baby was 9#               Sexual Activity Comment:        Type None current     Pain None historically   Orgasm    Masturbation    Libido              Pain Comment:        L LBP on occasion 1/10 today   R shoulder/arm pain Has aggravated this with exercises in the past               Other Comment:        Physical activity  Walking 4-5 days/wk  [facility]  Free weights on arms   PLOF    Living environment withhusband   Other osteoporosis   Sleep Fall asleep in recliner  5 hours only  cpap             Systems Review:   Cord questions:  Pins and  needles or tingling in both arms and both legs at the same time? denies  Problems with stumbling or falling? Denies     Cauda equina questions:  Problems with bowel and bladder control? Specifically retention? See above  Pins and needles or numbness in the saddle area? Denies     Review of systems:  General - (chills, night sweats, recent infection, fever, weight loss/gain, unexplained night pain, excessive fatigue): Denies  Do you have a history of cancer? Denies  Gastrointestinal system - (abdominal pain, bowel changes, nausea, vomiting, bloating): Denies  Cardiovascular system - (chest pain, palpitations, orthopnea, other): Denies  Respiratory system - (cough, SOB, sputum production, other): Denies  Musculoskeletal system: osteoporosis, vertebral fracture? Denies  Endocrine - (polyuria, polydipsia, heat or cold intolerance, other): Denies  Neurological - (numbness/tingling, falling/stumbling, HA, dizziness, diplopia, dysphagia/dysarthria, double vision, tinnitus, memory, drop attacks, other): Denies  Any long-term steroid use? Denies      Patient goals - reduce UI    OBJECTIVE FINDINGS:      ASSESSMENT   PELVIS/POSTURE            LUMBAR AROM    Flexion    Extension    rotation    Sidebending        HIP ROM    ER    IR    Flexion    Extension    Ankle ROM    Lower Extremity Strength    hip flexion    hip extension    Hip IR    Hip ER    Hip abduction    Knee    Ankle            SPECIAL TESTS    Hamstring length    Hip FADIR    Hip SCOUR    Trendelenburg    Slump test    SLR test    BOZENA            LUMBAR PALPATION    HIP PALPATION    OTHER PALPATION        SI JOINT TESTING     S/L Compression test    Thigh thrust test    Prone sacral thrust test    Gaenslen test        BALANCE    GAIT MECHANICS    RUNNING MECHANICS    SQUATTING MECHANICS    LUNGING MECHANICS    BED MOBILITY    SIT TO STAND    FLOOR TO STAND      Verbal consent give for internal evaluation and treatment. yes    Pelvic Floor MMT and Function  "INITIAL EVALUATION  2/22/23 pelvic eval F/U ASSESSMENT   Consent to eval and treat yes    Assessment position Hooklying, draped with sheet     EXTERNAL PFM EXAM No TTP B    PFM OBSERVATION     Skin integrity Atrophy, minor presence of labia minora    Contract Present, coordinates with breathing    Relax present    Bulge/pelvic drop present    Cough present    Perineal descent none    Vaginal wall laxity Pessary in place - did not assess    Light touch sensation intact    INTERNAL Assessment performed Internal vaginal     Levator Ani PERF       Power 3/5     Endurance 5     Repetitions 2 - well coordinated with breathing     Quick Flicks      Vaginal introitus loose    Preet-urethra No TTP    Levator palpation Min TTP  Dryness sensation  Mod tension    Puborectalis      Obturator Internus      Piriformis      TAILBONE          ABDOMEN     MMT strength      Diastasis Recti 1 finger width for 4\" preet-umbilicus     Pain Mild discomfort RA L>R  P! With pressure to that area following ab contraction     Breathing pattern WNL     Hip flexors No TTP    Skin integumentary/incision lines Unremarkable, well healed lap incision lines          PFDI OUTCOME MEASURE 70.8 total: 12.5 POPDI, 12.5 CRAD, 45.8 DARNELL     Adductors      Biofeedback                                                                                                                      TREATMENT LOG:  verbal consent for internal intervention: yes  Diagnosis  UI    Precautions       PT INITIAL EVALUATION:   2/14/23     PT PROGRESS NOTE:        Y/N Treatment Details: Time:   MODALITIES  10687   0 mins   Heat       Ice       THER ACT          57071   10 mins   OUTCOME MEASURES  PFDI    POC discussion      Review of symptoms  Tests/Measures      Falls Screen, Medication Review, VS, pain assessment h      Education:  h    h    h  h Pelvic floor anatomy/ purpose function    Intro to POP precautions: intro to breath with movements/exercise; no pushing/straining  Weight " loss goal discussion  Use of vaginal estrogen discussion - consider topical coconut oil vs replens vs reveree    *Pt verbalized understanding of education and returned demonstration appropriately*    Bladder Education      Bowel Education      Toilet Posture edu/demo/practice      Postural edu y        h Squatting mechanics - bend from knees and hips; knee valgus; =WB, shift slightly to R; exhale with the lift  15x picking up bolster    Minimize kyphosis to minimize worsening POP and pressure on the bladder    HEP / HEP Review Y  eval: TAC, TAC with add  2/22/23: bridge, hip abd, wall squat  2/27/23: KFO, shoulder row, SKTC str, piriformis str    THER EX         08458   45 mins   SUPINE THEREX:   h  y  y  y  y  y TAC with exhale 10x8 sec  TAC with bolster squeeze ADD 10x5 sec  TAC with hip abd, gtb 10x3 sec  TAC with bridge 15x3 sec, gtb at knees  TAC with march 5x3 alt reps  TAC with KFO    *all with coordinated breathing    SEATED THEREX:   y STS 15x with exhale - cued valgus    STANDING THEREX:   h  y  y Wall squat 5x slowly with coordinated exhale  Shoulder row 20x5 sec hold  No money in from of mirror 20x5 sec    STRETCHING:   h  y  y Hamstring str in seated 30 sec B  SKTC 3x20 sec  Piriformis str 3x20 sec           NEURO RE-ED    95515   0 mins   Diaphragmatic breathing      Postural Re-Edu      Pelvic eval y edu on findings  Cued coordination of PFMC with exhale    Coordination with EMG Biofeedback  Pelvic floor muscle strengthening with LumiFold biofeedback unit, external sensors placed at 9 and 3 o'clock of anal opening, grounding electrode placed on ischial tuberosity:   - SUPINE:   - SEATED:  - STANDING:      Biofeedback Objective Findings  SUPINE REST:  SUPINE WORK:  SEATED REST:  SEATED WORK:  STANDING REST:  STANDING WORK:    MANUAL                 02874   0 mins   Joint Mobilization       Deep Tissue mobilizations External      Deep Tissue Mobilization Internal      IASTM/MFR/TrP Release                 PLAN:   - progress TE - use TRX? Practice squats   - add no money and squats to HEP

## 2023-03-13 ENCOUNTER — HOSPITAL ENCOUNTER (OUTPATIENT)
Dept: PHYSICAL THERAPY | Age: 73
Setting detail: THERAPIES SERIES
Discharge: HOME | End: 2023-03-13
Attending: UROLOGY
Payer: MEDICARE

## 2023-03-13 DIAGNOSIS — N32.81 OAB (OVERACTIVE BLADDER): Primary | ICD-10-CM

## 2023-03-13 PROCEDURE — 97110 THERAPEUTIC EXERCISES: CPT | Mod: GP

## 2023-03-13 NOTE — OP PT TREATMENT LOG
Pt is a 72 y.o. F with complaint of pelvic pressure and heaviness, using a pessary current; and complaints of daily UI.  Feels like sitting on a “ball” when not wearing a pessary. Using pessary right now and for last 1 year, with Dr. Mcdermott. Some bloody discharge prior to pessary, only noted one time since pessary in place.  Had recent US which was negative. Pt wants to avoid surgery for now.     Bladder: Comment:            Urination frequency Likely WNL, thinks about 8x/day   Urgency:  Can be urgent   Incontinence UUI first in the AM sometimes, maybe with running errands away from home    NAOMI with coughing/sneezing; lifting something heavy;   Usually a small amount of leak unless lifting something heavy    No UI stairs, walking, bed mobility   Pads Liner mult/day, will change when damp   Nocturia 5 hours without need to void, only sleeps 5 hours   Pain None   Emptying Complete  Pessary helped with starting the stream   Prolapse symptoms  Minimal with pessary, sometimes a discomfort, feels like she needs a break   Liquid consumption 3-4 quarts water  Decaf coffee in AM   UTI history    Irritants          Bowel: Comment:        Frequency Every other day   Urge WNL   Incontinence Avoids eggplant and food irritants   Emptying Complete, no pushing/straining   [place] stool smooth   Fiber Well rounded, fruits>veggies   Management Apricots   Pain None current    No bleeding current     OBGYN Comment:        Pregnancies 3   Births 3   Birth type Vaginal   Tears/ episiotomies Episiotomy x3   Surgery B ovaries removed due to cyst  2012  appendectomy   Menstruation Menopause    Last baby was 9#               Sexual Activity Comment:        Type None current     Pain None historically   Orgasm    Masturbation    Libido              Pain Comment:        L LBP on occasion 1/10 today   R shoulder/arm pain Has aggravated this with exercises in the past               Other Comment:        Physical activity  Walking 4-5  days/wk  [facility]  Free weights on arms   PLOF    Living environment withhusband   Other osteoporosis   Sleep Fall asleep in recliner  5 hours only  cpap             Systems Review:   Cord questions:  Pins and needles or tingling in both arms and both legs at the same time? denies  Problems with stumbling or falling? Denies     Cauda equina questions:  Problems with bowel and bladder control? Specifically retention? See above  Pins and needles or numbness in the saddle area? Denies     Review of systems:  General - (chills, night sweats, recent infection, fever, weight loss/gain, unexplained night pain, excessive fatigue): Denies  Do you have a history of cancer? Denies  Gastrointestinal system - (abdominal pain, bowel changes, nausea, vomiting, bloating): Denies  Cardiovascular system - (chest pain, palpitations, orthopnea, other): Denies  Respiratory system - (cough, SOB, sputum production, other): Denies  Musculoskeletal system: osteoporosis, vertebral fracture? Denies  Endocrine - (polyuria, polydipsia, heat or cold intolerance, other): Denies  Neurological - (numbness/tingling, falling/stumbling, HA, dizziness, diplopia, dysphagia/dysarthria, double vision, tinnitus, memory, drop attacks, other): Denies  Any long-term steroid use? Denies      Patient goals - reduce UI    OBJECTIVE FINDINGS:      ASSESSMENT   PELVIS/POSTURE            LUMBAR AROM    Flexion    Extension    rotation    Sidebending        HIP ROM    ER    IR    Flexion    Extension    Ankle ROM    Lower Extremity Strength    hip flexion    hip extension    Hip IR    Hip ER    Hip abduction    Knee    Ankle            SPECIAL TESTS    Hamstring length    Hip FADIR    Hip SCOUR    Trendelenburg    Slump test    SLR test    BOZENA            LUMBAR PALPATION    HIP PALPATION    OTHER PALPATION        SI JOINT TESTING     S/L Compression test    Thigh thrust test    Prone sacral thrust test    Gaenslen test        BALANCE    GAIT MECHANICS  "   RUNNING MECHANICS    SQUATTING MECHANICS    LUNGING MECHANICS    BED MOBILITY    SIT TO STAND    FLOOR TO STAND      Verbal consent give for internal evaluation and treatment. yes    Pelvic Floor MMT and Function INITIAL EVALUATION  2/22/23 pelvic eval F/U ASSESSMENT 3/13/23   Consent to eval and treat yes    Assessment position Hooklying, draped with sheet     EXTERNAL PFM EXAM No TTP B    PFM OBSERVATION     Skin integrity Atrophy, minor presence of labia minora    Contract Present, coordinates with breathing    Relax present    Bulge/pelvic drop present    Cough present    Perineal descent none    Vaginal wall laxity Pessary in place - did not assess    Light touch sensation intact    INTERNAL Assessment performed Internal vaginal     Levator Ani PERF       Power 3/5     Endurance 5     Repetitions 2 - well coordinated with breathing     Quick Flicks      Vaginal introitus loose    Preet-urethra No TTP    Levator palpation Min TTP  Dryness sensation  Mod tension    Puborectalis      Obturator Internus      Piriformis      TAILBONE          ABDOMEN     MMT strength      Diastasis Recti 1 finger width for 4\" preet-umbilicus    Pain Mild discomfort RA L>R  P! With pressure to that area following ab contraction    Breathing pattern WNL WNL    Hip flexors No TTP No TTP   Skin integumentary/incision lines Unremarkable, well healed lap incision lines unremarkable         PFDI OUTCOME MEASURE 70.8 total: 12.5 POPDI, 12.5 CRAD, 45.8 DARNELL 119.8 total; 16.7 POPDI, 40.6 CRAD, 62.5 DARNELL   Adductors      Biofeedback                                                                                                                      TREATMENT LOG:  verbal consent for internal intervention: yes  Diagnosis  UI    Precautions       PT INITIAL EVALUATION:   2/14/23     PT PROGRESS NOTE:        Y/N Treatment Details: Time:   MODALITIES  05922   0 mins   Heat       Ice       THER ACT          27485   10 mins   OUTCOME MEASURES y PFDI  " "  POC discussion y     Review of symptoms  Tests/Measures y  y     Falls Screen, Medication Review, VS, pain assessment y      Education:  h    h    h  h Pelvic floor anatomy/ purpose function    Intro to POP precautions: intro to breath with movements/exercise; no pushing/straining  Weight loss goal discussion  Use of vaginal estrogen discussion - consider topical coconut oil vs replens vs reveree    *Pt verbalized understanding of education and returned demonstration appropriately*    Bladder Education      Bowel Education      Toilet Posture edu/demo/practice      Postural edu y        h Squatting mechanics - bend from knees and hips; knee valgus; =WB, shift slightly to R; exhale with the lift  15x picking up bolster    Minimize kyphosis to minimize worsening POP and pressure on the bladder    HEP / HEP Review   eval: TAC, TAC with add  2/22/23: bridge, hip abd, wall squat  2/27/23: KFO, shoulder row, SKTC str, piriformis str    THER EX         22090   40 mins   SUPINE THEREX:   h  y  y  y  y  y TAC with exhale 10x8 sec  TAC with bolster squeeze ADD 10x5 sec  TAC with hip abd, gtb 10x3 sec  TAC with bridge 15x3 sec, gtb at knees  TAC with march 5x3 alt reps  TAC with KFO    *all with coordinated breathing    SEATED THEREX:   y STS 2x6 with exhale - cued valgus L>R    STANDING THEREX:   h  y  h    y Wall squat 5x slowly with coordinated exhale  Shoulder row 20x5 sec hold  No money in from of mirror 20x5 sec    TRX mini squats 2x15 reps - cud knee valgus, full foot contact, \"sit back in chair\"    STRETCHING:   h  y  y Hamstring str in seated 30 sec B  SKTC 3x20 sec  Piriformis str 3x20 sec           NEURO RE-ED    06177   0 mins   Diaphragmatic breathing      Postural Re-Edu      Pelvic eval y edu on findings  Cued coordination of PFMC with exhale    Coordination with EMG Biofeedback  Pelvic floor muscle strengthening with Vidly biofeedback unit, external sensors placed at 9 and 3 o'clock of anal opening, " grounding electrode placed on ischial tuberosity:   - SUPINE:   - SEATED:  - STANDING:      Biofeedback Objective Findings  SUPINE REST:  SUPINE WORK:  SEATED REST:  SEATED WORK:  STANDING REST:  STANDING WORK:    MANUAL                 03217   0 mins   Joint Mobilization       Deep Tissue mobilizations External      Deep Tissue Mobilization Internal      IASTM/MFR/TrP Release                PLAN:   - progress TE - use TRX? Practice squats   - add no money and squats to HEP

## 2023-03-13 NOTE — PROGRESS NOTES
PT PROGRESS NOTE FOR OUTPATIENT THERAPY    Patient: Georgina Briseno MRN: [id number]  : 1950 72 y.o.  Referring Physician: Rocio Latham MD  Date of Visit: 3/13/2023      Certification Dates: 23 through 05/15/23    Recommended Frequency & Duration:  1 time/week for up to 3 months          Diagnosis:   1. OAB (overactive bladder)        Chief Complaints:  Chief Complaint   Patient presents with   • Pain   • Other     Bowel and bladder function       Precautions:   Existing Precautions/Restrictions: no known precautions/restrictions      TODAY'S VISIT:    Time In Session:  Start Time: 809 (pt late to PT session)  Stop Time: 859  Time Calculation (min): 50 min   General Information - 23 0810        Session Details    Document Type progress note     Mode of Treatment individual therapy        General Information    Referring Physician Dr. Rocio Latham MD     History of present illness/functional impairment Reaching into cabinets may cause a leak, bending forward may cause a leak, minimal NAOMI with coughing/sneezing. Less urgency after going on a walk, can do something around the house before using the BR after the walk. Less UUI, may feel an urge but does not leak. More urgency after coffee in the AM, more frequency after coffee in the AM, does not leak with urge. Wears 1 or more pads a day, will change a pad when damp, especially if going out of the house.     Existing Precautions/Restrictions no known precautions/restrictions                Daily Falls Screen - 23 0810        Daily Falls Assessment    Patient reported fall since last visit No                Pain/Vitals - 23 0810        Pain Assessment    Currently in pain No/Denies        Pain Intervention    Intervention  n/a     Post Intervention Comments n/a                PT - 23 0810        Physical Therapy    Physical Therapy Specialty Pelvic Floor Program: Age 14+        PT Plan    Frequency of treatment 1  time/week     PT Duration 3 months     PT Cert From 02/14/23     PT Cert To 05/15/23     Date PT POC was sent to provider 02/14/23     Signed PT Plan of Care received?  Yes                Assessment and Plan - 03/13/23 0810        Assessment    Clinical Assessment Pt is noting significantly less UI with urgency and less UI throughout the day. Pt is pleased with pessary at present and has not noted complaints. Pt demonstrated greater core/PFM coordination and awareness. Practicing many functional movements with imrpoved form and core contraction to prevent UI with these tasks.                 OBJECTIVE MEASUREMENTS/DATA:        Outcome Measures        2/14/2023    08:13 3/13/2023    08:10   PT SUBJECTIVE Outcome Measures   Other PFDI 70.8 total PFDI 119.8 total       Today's Treatment::    Education provided:  Yes: See treatment log for details of education provided    Pt is a 72 y.o. F with complaint of pelvic pressure and heaviness, using a pessary current; and complaints of daily UI.  Feels like sitting on a “ball” when not wearing a pessary. Using pessary right now and for last 1 year, with Dr. Mcdermott. Some bloody discharge prior to pessary, only noted one time since pessary in place.  Had recent US which was negative. Pt wants to avoid surgery for now.     Bladder: Comment:            Urination frequency Likely WNL, thinks about 8x/day   Urgency:  Can be urgent   Incontinence UUI first in the AM sometimes, maybe with running errands away from home    NAOMI with coughing/sneezing; lifting something heavy;   Usually a small amount of leak unless lifting something heavy    No UI stairs, walking, bed mobility   Pads Liner mult/day, will change when damp   Nocturia 5 hours without need to void, only sleeps 5 hours   Pain None   Emptying Complete  Pessary helped with starting the stream   Prolapse symptoms  Minimal with pessary, sometimes a discomfort, feels like she needs a break   Liquid consumption 3-4 quarts  water  Decaf coffee in AM   UTI history    Irritants          Bowel: Comment:        Frequency Every other day   Urge WNL   Incontinence Avoids eggplant and food irritants   Emptying Complete, no pushing/straining   [place] stool smooth   Fiber Well rounded, fruits>veggies   Management Apricots   Pain None current    No bleeding current     OBGYN Comment:        Pregnancies 3   Births 3   Birth type Vaginal   Tears/ episiotomies Episiotomy x3   Surgery B ovaries removed due to cyst  2012  appendectomy   Menstruation Menopause    Last baby was 9#               Sexual Activity Comment:        Type None current     Pain None historically   Orgasm    Masturbation    Libido              Pain Comment:        L LBP on occasion 1/10 today   R shoulder/arm pain Has aggravated this with exercises in the past               Other Comment:        Physical activity  Walking 4-5 days/wk  [facility] on occasion  Free weights on arms   PLOF    Living environment withhusband   Other osteoporosis   Sleep Fall asleep in recliner  5 hours only  cpap             Systems Review:   Cord questions:  Pins and needles or tingling in both arms and both legs at the same time? denies  Problems with stumbling or falling? Denies     Cauda equina questions:  Problems with bowel and bladder control? Specifically retention? See above  Pins and needles or numbness in the saddle area? Denies     Review of systems:  General - (chills, night sweats, recent infection, fever, weight loss/gain, unexplained night pain, excessive fatigue): Denies  Do you have a history of cancer? Denies  Gastrointestinal system - (abdominal pain, bowel changes, nausea, vomiting, bloating): Denies  Cardiovascular system - (chest pain, palpitations, orthopnea, other): Denies  Respiratory system - (cough, SOB, sputum production, other): Denies  Musculoskeletal system: osteoporosis, vertebral fracture? Denies  Endocrine - (polyuria, polydipsia, heat or cold intolerance, other):  Denies  Neurological - (numbness/tingling, falling/stumbling, HA, dizziness, diplopia, dysphagia/dysarthria, double vision, tinnitus, memory, drop attacks, other): Denies  Any long-term steroid use? Denies      Patient goals - reduce UI    OBJECTIVE FINDINGS:      ASSESSMENT   PELVIS/POSTURE            LUMBAR AROM    Flexion    Extension    rotation    Sidebending        HIP ROM    ER    IR    Flexion    Extension    Ankle ROM    Lower Extremity Strength    hip flexion    hip extension    Hip IR    Hip ER    Hip abduction    Knee    Ankle            SPECIAL TESTS    Hamstring length    Hip FADIR    Hip SCOUR    Trendelenburg    Slump test    SLR test    BOZENA            LUMBAR PALPATION    HIP PALPATION    OTHER PALPATION        SI JOINT TESTING     S/L Compression test    Thigh thrust test    Prone sacral thrust test    Gaenslen test        BALANCE    GAIT MECHANICS    RUNNING MECHANICS    SQUATTING MECHANICS    LUNGING MECHANICS    BED MOBILITY    SIT TO STAND    FLOOR TO STAND      Verbal consent give for internal evaluation and treatment. yes    Pelvic Floor MMT and Function INITIAL EVALUATION  2/22/23 pelvic eval F/U ASSESSMENT 3/13/23   Consent to eval and treat yes    Assessment position Hooklying, draped with sheet     EXTERNAL PFM EXAM No TTP B    PFM OBSERVATION     Skin integrity Atrophy, minor presence of labia minora    Contract Present, coordinates with breathing    Relax present    Bulge/pelvic drop present    Cough present    Perineal descent none    Vaginal wall laxity Pessary in place - did not assess    Light touch sensation intact    INTERNAL Assessment performed Internal vaginal     Levator Ani PERF       Power 3/5     Endurance 5     Repetitions 2 - well coordinated with breathing     Quick Flicks      Vaginal introitus loose    Shoshana-urethra No TTP    Levator palpation Min TTP  Dryness sensation  Mod tension    Puborectalis      Obturator Internus      Piriformis      TAILBONE          ABDOMEN   "   MMT strength      Diastasis Recti 1 finger width for 4\" preet-umbilicus    Pain Mild discomfort RA L>R  P! With pressure to that area following ab contraction    Breathing pattern WNL WNL    Hip flexors No TTP No TTP   Skin integumentary/incision lines Unremarkable, well healed lap incision lines unremarkable         PFDI OUTCOME MEASURE 70.8 total: 12.5 POPDI, 12.5 CRAD, 45.8 DARNELL 119.8 total; 16.7 POPDI, 40.6 CRAD, 62.5 DARNELL   Adductors      Biofeedback                                                                                                                      TREATMENT LOG:  verbal consent for internal intervention: yes  Diagnosis  UI    Precautions       PT INITIAL EVALUATION:   2/14/23     PT PROGRESS NOTE:        Y/N Treatment Details: Time:   MODALITIES  89615   0 mins   Heat       Ice       THER ACT          79144   10 mins   OUTCOME MEASURES y PFDI    POC discussion y     Review of symptoms  Tests/Measures y  y     Falls Screen, Medication Review, VS, pain assessment y      Education:  h    h    h  h Pelvic floor anatomy/ purpose function    Intro to POP precautions: intro to breath with movements/exercise; no pushing/straining  Weight loss goal discussion  Use of vaginal estrogen discussion - consider topical coconut oil vs replens vs reveree    *Pt verbalized understanding of education and returned demonstration appropriately*    Bladder Education      Bowel Education      Toilet Posture edu/demo/practice      Postural edu y        h Squatting mechanics - bend from knees and hips; knee valgus; =WB, shift slightly to R; exhale with the lift  15x picking up bolster    Minimize kyphosis to minimize worsening POP and pressure on the bladder    HEP / HEP Review   eval: TAC, TAC with add  2/22/23: bridge, hip abd, wall squat  2/27/23: KFO, shoulder row, SKTC str, piriformis str    THER EX         20987   40 mins   SUPINE THEREX:   h  y  y  y  y  y TAC with exhale 10x8 sec  TAC with bolster squeeze ADD " "10x5 sec  TAC with hip abd, gtb 10x3 sec  TAC with bridge 15x3 sec, gtb at knees  TAC with march 5x3 alt reps  TAC with KFO    *all with coordinated breathing    SEATED THEREX:   y STS 2x6 with exhale - cued valgus L>R    STANDING THEREX:   h  y  h    y Wall squat 5x slowly with coordinated exhale  Shoulder row 20x5 sec hold  No money in from of mirror 20x5 sec    TRX mini squats 2x15 reps - cud knee valgus, full foot contact, \"sit back in chair\"    STRETCHING:   h  y  y Hamstring str in seated 30 sec B  SKTC 3x20 sec  Piriformis str 3x20 sec           NEURO RE-ED    56394   0 mins   Diaphragmatic breathing      Postural Re-Edu      Pelvic eval y edu on findings  Cued coordination of PFMC with exhale    Coordination with EMG Biofeedback  Pelvic floor muscle strengthening with [employer] biofeedback unit, external sensors placed at 9 and 3 o'clock of anal opening, grounding electrode placed on ischial tuberosity:   - SUPINE:   - SEATED:  - STANDING:      Biofeedback Objective Findings  SUPINE REST:  SUPINE WORK:  SEATED REST:  SEATED WORK:  STANDING REST:  STANDING WORK:    MANUAL                 63128   0 mins   Joint Mobilization       Deep Tissue mobilizations External      Deep Tissue Mobilization Internal      IASTM/MFR/TrP Release                PLAN:   - progress TE - use TRX? Practice squats   - add no money and squats to HEP           Goals Addressed                 This Visit's Progress    • Mutually agreed upon pain goal   On track     Mutually agreed upon pain goal: n/a    PATIENT STATED: reduce UI      • Pelvic PT Goals   On track     Pt will be I with initial pain management strategies and PFM relaxation/coordination/strengthening exercises to improve pt's bladder function: 3 wks MET    Pt will be able to reduce urinary freq to 3 hours ave, 90% of the time, for improved bladder function and attention to work/errand task: 6 wks IMPROVING    Pt will be able to successfully demonstrate urge suppression " strategies to delay urinary urgency for 5 min, 90% of the time, for improved bladder function and attention to errand task: 8 wks IMPROVING    Pt will be I with diaphragmatic breathing for reducing tension, stress, pelvic pain complaints: 4 wks MET    Pt will demonstrate the knack with cough/laugh/sneeze, 90% of the time, to reduce risk of urinary incontinence: 8 wks IMPROVING    Pt will report urinary leakage no greater than 2x/day, for improved bladder function: 8 wks IMPROVING    Pt will note 50% reduction in pad wetness, indicating improvement in UI: 12 wks IMPROVING    Pt will report urinary leakage no greater than 1x/wk, for improved bladder function: 12 wks    PFDI score will improve to below 55.8, indicating improvements in bladder function and QOL (70.8 on IE): 12 wks     Pt will be I with progression of PFM strengthening exercises to maintain/improve strength and bladder function: 12 wks

## 2023-03-13 NOTE — LETTER
[address]  [facility] [address]  [place] [facility] Fax: [phone]    PHYSICAL THERAPY UPDATE    Patient Name: Georgina Briseno    Provider: Deann Waters PT  Referring Provider: Rocio Latham MD  PCP: Don Rand MD  Payor: Payor: MEDICARE / Plan: MEDICARE PART A & B / Product Type: Medicare /   Medical Diagnosis: OAB (overactive bladder) [N32.81]     Dear Dr. Latham,    Thank you for the referral of your patient Georgina Briseno for physical therapy. I am writing to you today to provide you an update on the status of your patient. Please review the latest progress note below and the goals being addressed during this plan of care.     If you have any questions or concerns, please feel free to contact me. Once again, thank you for your referral and the opportunity to work with your patient.     Sincerely,  Deann Waters PT    Rehab Potential:          PT PROGRESS NOTE FOR OUTPATIENT THERAPY    Patient: Georgina Briseno MRN: [id number]  : 1950 72 y.o.  Referring Physician: Rocio Lahtam MD  Date of Visit: 3/13/2023      Certification Dates: 23 through 05/15/23    Recommended Frequency & Duration:  1 time/week for up to 3 months          Diagnosis:   1. OAB (overactive bladder)        Chief Complaints:  Chief Complaint   Patient presents with   • Pain   • Other     Bowel and bladder function       Precautions:   Existing Precautions/Restrictions: no known precautions/restrictions      TODAY'S VISIT:    Time In Session:  Start Time: 809 (pt late to PT session)  Stop Time: 859  Time Calculation (min): 50 min   General Information - 23 0810        Session Details    Document Type progress note     Mode of Treatment individual therapy        General Information    Referring Physician Dr. Rocio Latham MD     History of present illness/functional impairment Reaching into cabinets may cause a leak, bending forward may cause a leak, minimal NAOMI with  coughing/sneezing. Less urgency after going on a walk, can do something around the house before using the BR after the walk. Less UUI, may feel an urge but does not leak. More urgency after coffee in the AM, more frequency after coffee in the AM, does not leak with urge. Wears 1 or more pads a day, will change a pad when damp, especially if going out of the house.     Existing Precautions/Restrictions no known precautions/restrictions                Daily Falls Screen - 03/13/23 0810        Daily Falls Assessment    Patient reported fall since last visit No                Pain/Vitals - 03/13/23 0810        Pain Assessment    Currently in pain No/Denies        Pain Intervention    Intervention  n/a     Post Intervention Comments n/a                PT - 03/13/23 0810        Physical Therapy    Physical Therapy Specialty Pelvic Floor Program: Age 14+        PT Plan    Frequency of treatment 1 time/week     PT Duration 3 months     PT Cert From 02/14/23     PT Cert To 05/15/23     Date PT POC was sent to provider 02/14/23     Signed PT Plan of Care received?  Yes                Assessment and Plan - 03/13/23 0810        Assessment    Clinical Assessment Pt is noting significantly less UI with urgency and less UI throughout the day. Pt is pleased with pessary at present and has not noted complaints. Pt demonstrated greater core/PFM coordination and awareness. Practicing many functional movements with imrpoved form and core contraction to prevent UI with these tasks.                 OBJECTIVE MEASUREMENTS/DATA:        Outcome Measures        2/14/2023    08:13 3/13/2023    08:10   PT SUBJECTIVE Outcome Measures   Other PFDI 70.8 total PFDI 119.8 total       Today's Treatment::    Education provided:  Yes: See treatment log for details of education provided    Pt is a 72 y.o. F with complaint of pelvic pressure and heaviness, using a pessary current; and complaints of daily UI.  Feels like sitting on a “ball” when not  wearing a pessary. Using pessary right now and for last 1 year, with Dr. Mcdermott. Some bloody discharge prior to pessary, only noted one time since pessary in place.  Had recent US which was negative. Pt wants to avoid surgery for now.     Bladder: Comment:            Urination frequency Likely WNL, thinks about 8x/day   Urgency:  Can be urgent   Incontinence UUI first in the AM sometimes, maybe with running errands away from home    NAOMI with coughing/sneezing; lifting something heavy;   Usually a small amount of leak unless lifting something heavy    No UI stairs, walking, bed mobility   Pads Liner mult/day, will change when damp   Nocturia 5 hours without need to void, only sleeps 5 hours   Pain None   Emptying Complete  Pessary helped with starting the stream   Prolapse symptoms  Minimal with pessary, sometimes a discomfort, feels like she needs a break   Liquid consumption 3-4 quarts water  Decaf coffee in AM   UTI history    Irritants          Bowel: Comment:        Frequency Every other day   Urge WNL   Incontinence Avoids eggplant and food irritants   Emptying Complete, no pushing/straining   [place] stool smooth   Fiber Well rounded, fruits>veggies   Management Apricots   Pain None current    No bleeding current     OBGYN Comment:        Pregnancies 3   Births 3   Birth type Vaginal   Tears/ episiotomies Episiotomy x3   Surgery B ovaries removed due to cyst  2012  appendectomy   Menstruation Menopause    Last baby was 9#               Sexual Activity Comment:        Type None current     Pain None historically   Orgasm    Masturbation    Libido              Pain Comment:        L LBP on occasion 1/10 today   R shoulder/arm pain Has aggravated this with exercises in the past               Other Comment:        Physical activity  Walking 4-5 days/wk  [facility]  Free weights on arms   PLOF    Living environment withhusband   Other osteoporosis   Sleep Fall asleep in recliner  5 hours only  cpap              Systems Review:   Cord questions:  Pins and needles or tingling in both arms and both legs at the same time? denies  Problems with stumbling or falling? Denies     Cauda equina questions:  Problems with bowel and bladder control? Specifically retention? See above  Pins and needles or numbness in the saddle area? Denies     Review of systems:  General - (chills, night sweats, recent infection, fever, weight loss/gain, unexplained night pain, excessive fatigue): Denies  Do you have a history of cancer? Denies  Gastrointestinal system - (abdominal pain, bowel changes, nausea, vomiting, bloating): Denies  Cardiovascular system - (chest pain, palpitations, orthopnea, other): Denies  Respiratory system - (cough, SOB, sputum production, other): Denies  Musculoskeletal system: osteoporosis, vertebral fracture? Denies  Endocrine - (polyuria, polydipsia, heat or cold intolerance, other): Denies  Neurological - (numbness/tingling, falling/stumbling, HA, dizziness, diplopia, dysphagia/dysarthria, double vision, tinnitus, memory, drop attacks, other): Denies  Any long-term steroid use? Denies      Patient goals - reduce UI    OBJECTIVE FINDINGS:      ASSESSMENT   PELVIS/POSTURE            LUMBAR AROM    Flexion    Extension    rotation    Sidebending        HIP ROM    ER    IR    Flexion    Extension    Ankle ROM    Lower Extremity Strength    hip flexion    hip extension    Hip IR    Hip ER    Hip abduction    Knee    Ankle            SPECIAL TESTS    Hamstring length    Hip FADIR    Hip SCOUR    Trendelenburg    Slump test    SLR test    BOZENA            LUMBAR PALPATION    HIP PALPATION    OTHER PALPATION        SI JOINT TESTING     S/L Compression test    Thigh thrust test    Prone sacral thrust test    Gaenslen test        BALANCE    GAIT MECHANICS    RUNNING MECHANICS    SQUATTING MECHANICS    LUNGING MECHANICS    BED MOBILITY    SIT TO STAND    FLOOR TO STAND      Verbal consent give for internal evaluation and  "treatment. yes    Pelvic Floor MMT and Function INITIAL EVALUATION  2/22/23 pelvic eval F/U ASSESSMENT 3/13/23   Consent to eval and treat yes    Assessment position Hooklying, draped with sheet     EXTERNAL PFM EXAM No TTP B    PFM OBSERVATION     Skin integrity Atrophy, minor presence of labia minora    Contract Present, coordinates with breathing    Relax present    Bulge/pelvic drop present    Cough present    Perineal descent none    Vaginal wall laxity Pessary in place - did not assess    Light touch sensation intact    INTERNAL Assessment performed Internal vaginal     Levator Ani PERF       Power 3/5     Endurance 5     Repetitions 2 - well coordinated with breathing     Quick Flicks      Vaginal introitus loose    Preet-urethra No TTP    Levator palpation Min TTP  Dryness sensation  Mod tension    Puborectalis      Obturator Internus      Piriformis      TAILBONE          ABDOMEN     MMT strength      Diastasis Recti 1 finger width for 4\" preet-umbilicus    Pain Mild discomfort RA L>R  P! With pressure to that area following ab contraction    Breathing pattern WNL WNL    Hip flexors No TTP No TTP   Skin integumentary/incision lines Unremarkable, well healed lap incision lines unremarkable         PFDI OUTCOME MEASURE 70.8 total: 12.5 POPDI, 12.5 CRAD, 45.8 DARNELL 119.8 total; 16.7 POPDI, 40.6 CRAD, 62.5 DARNELL   Adductors      Biofeedback                                                                                                                      TREATMENT LOG:  verbal consent for internal intervention: yes  Diagnosis  UI    Precautions       PT INITIAL EVALUATION:   2/14/23     PT PROGRESS NOTE:        Y/N Treatment Details: Time:   MODALITIES  61461   0 mins   Heat       Ice       THER ACT          23125   10 mins   OUTCOME MEASURES y PFDI    POC discussion y     Review of symptoms  Tests/Measures y  y     Falls Screen, Medication Review, VS, pain assessment y      Education:  h    h    h  h Pelvic floor " "anatomy/ purpose function    Intro to POP precautions: intro to breath with movements/exercise; no pushing/straining  Weight loss goal discussion  Use of vaginal estrogen discussion - consider topical coconut oil vs replens vs reveree    *Pt verbalized understanding of education and returned demonstration appropriately*    Bladder Education      Bowel Education      Toilet Posture edu/demo/practice      Postural edu y        h Squatting mechanics - bend from knees and hips; knee valgus; =WB, shift slightly to R; exhale with the lift  15x picking up bolster    Minimize kyphosis to minimize worsening POP and pressure on the bladder    HEP / HEP Review   eval: TAC, TAC with add  2/22/23: bridge, hip abd, wall squat  2/27/23: KFO, shoulder row, SKTC str, piriformis str    THER EX         89395   40 mins   SUPINE THEREX:   h  y  y  y  y  y TAC with exhale 10x8 sec  TAC with bolster squeeze ADD 10x5 sec  TAC with hip abd, gtb 10x3 sec  TAC with bridge 15x3 sec, gtb at knees  TAC with march 5x3 alt reps  TAC with KFO    *all with coordinated breathing    SEATED THEREX:   y STS 2x6 with exhale - cued valgus L>R    STANDING THEREX:   h  y  h    y Wall squat 5x slowly with coordinated exhale  Shoulder row 20x5 sec hold  No money in from of mirror 20x5 sec    TRX mini squats 2x15 reps - cud knee valgus, full foot contact, \"sit back in chair\"    STRETCHING:   h  y  y Hamstring str in seated 30 sec B  SKTC 3x20 sec  Piriformis str 3x20 sec           NEURO RE-ED    21572   0 mins   Diaphragmatic breathing      Postural Re-Edu      Pelvic eval y edu on findings  Cued coordination of PFMC with exhale    Coordination with EMG Biofeedback  Pelvic floor muscle strengthening with [employer] biofeedback unit, external sensors placed at 9 and 3 o'clock of anal opening, grounding electrode placed on ischial tuberosity:   - SUPINE:   - SEATED:  - STANDING:      Biofeedback Objective Findings  SUPINE REST:  SUPINE WORK:  SEATED " REST:  SEATED WORK:  STANDING REST:  STANDING WORK:    MANUAL                 33261   0 mins   Joint Mobilization       Deep Tissue mobilizations External      Deep Tissue Mobilization Internal      IASTM/MFR/TrP Release                PLAN:   - progress TE - use TRX? Practice squats   - add no money and squats to HEP           Goals Addressed                 This Visit's Progress    • Mutually agreed upon pain goal   On track     Mutually agreed upon pain goal: n/a    PATIENT STATED: reduce UI      • Pelvic PT Goals   On track     Pt will be I with initial pain management strategies and PFM relaxation/coordination/strengthening exercises to improve pt's bladder function: 3 wks MET    Pt will be able to reduce urinary freq to 3 hours ave, 90% of the time, for improved bladder function and attention to work/errand task: 6 wks IMPROVING    Pt will be able to successfully demonstrate urge suppression strategies to delay urinary urgency for 5 min, 90% of the time, for improved bladder function and attention to errand task: 8 wks IMPROVING    Pt will be I with diaphragmatic breathing for reducing tension, stress, pelvic pain complaints: 4 wks MET    Pt will demonstrate the knack with cough/laugh/sneeze, 90% of the time, to reduce risk of urinary incontinence: 8 wks IMPROVING    Pt will report urinary leakage no greater than 2x/day, for improved bladder function: 8 wks IMPROVING    Pt will note 50% reduction in pad wetness, indicating improvement in UI: 12 wks IMPROVING    Pt will report urinary leakage no greater than 1x/wk, for improved bladder function: 12 wks    PFDI score will improve to below 55.8, indicating improvements in bladder function and QOL (70.8 on IE): 12 wks     Pt will be I with progression of PFM strengthening exercises to maintain/improve strength and bladder function: 12 wks

## 2023-03-27 ENCOUNTER — HOSPITAL ENCOUNTER (OUTPATIENT)
Dept: PHYSICAL THERAPY | Age: 73
Setting detail: THERAPIES SERIES
Discharge: HOME | End: 2023-03-27
Attending: UROLOGY
Payer: MEDICARE

## 2023-03-27 DIAGNOSIS — N32.81 OAB (OVERACTIVE BLADDER): Primary | ICD-10-CM

## 2023-03-27 PROCEDURE — 97530 THERAPEUTIC ACTIVITIES: CPT | Mod: GP

## 2023-03-27 PROCEDURE — 97110 THERAPEUTIC EXERCISES: CPT | Mod: GP

## 2023-03-27 NOTE — PROGRESS NOTES
PT DAILY NOTE FOR OUTPATIENT THERAPY    Patient: Georgina Briseno MRN: [id number]  : 1950 72 y.o.  Referring Physician: Rocio Latham MD  Date of Visit: 3/27/2023    Certification Dates: 23 through 05/15/23    Diagnosis:   1. OAB (overactive bladder)               Precautions:   Existing Precautions/Restrictions: no known precautions/restrictions      TODAY'S VISIT    Time In Session:  Start Time: 908 (pt late to PT appt)  Stop Time: 955  Time Calculation (min): 47 min   History/Vitals/Pain/Encounter Info - 23 09        Injury History/Precautions/Daily Required Info    Document Type daily treatment     Referring Physician Dr. Rocio Latham MD     Existing Precautions/Restrictions no known precautions/restrictions     Patient/Family/Caregiver Comments/Observations Doing exercises regularly. Minimal complaints when away on vacation in [facility], a lot of walking without complaints, used moderate pads but then switched to lighter ones. Typically uses light pad. More JIC voiding when on vacation but able to hold for 2 hours. Pt did have one strong episode of UUI when out on a walk, more soaked pad, not able to control.     Patient reported fall since last visit No        Pain Assessment    Currently in pain No/Denies        Pain Intervention    Intervention  n/a     Post Intervention Comments n/a                Daily Treatment Assessment and Plan - 23 09        Daily Treatment Assessment and Plan    Progress toward goals Progressing     Daily Outcome Summary Trying progression of therex today and pt is demoing understanding. Reviewed and reinforced bladder urgency suppression strategies, especially for walking. Added strengthening with walking movement pattern to prevent UI with walks.                     Today's Treatment:    Pt is a 72 y.o. F with complaint of pelvic pressure and heaviness, using a pessary current; and complaints of daily UI.  Feels like sitting on a “ball”  when not wearing a pessary. Using pessary right now and for last 1 year, with Dr. Mcdermott. Some bloody discharge prior to pessary, only noted one time since pessary in place.  Had recent US which was negative. Pt wants to avoid surgery for now.     Bladder: Comment:            Urination frequency Likely WNL, thinks about 8x/day   Urgency:  Can be urgent   Incontinence UUI first in the AM sometimes, maybe with running errands away from home    NAOMI with coughing/sneezing; lifting something heavy;   Usually a small amount of leak unless lifting something heavy    No UI stairs, walking, bed mobility   Pads Liner mult/day, will change when damp   Nocturia 5 hours without need to void, only sleeps 5 hours   Pain None   Emptying Complete  Pessary helped with starting the stream   Prolapse symptoms  Minimal with pessary, sometimes a discomfort, feels like she needs a break   Liquid consumption 3-4 quarts water  Decaf coffee in AM   UTI history    Irritants          Bowel: Comment:        Frequency Every other day   Urge WNL   Incontinence Avoids eggplant and food irritants   Emptying Complete, no pushing/straining   [place] stool smooth   Fiber Well rounded, fruits>veggies   Management Apricots   Pain None current    No bleeding current     OBGYN Comment:        Pregnancies 3   Births 3   Birth type Vaginal   Tears/ episiotomies Episiotomy x3   Surgery B ovaries removed due to cyst  2012  appendectomy   Menstruation Menopause    Last baby was 9#               Sexual Activity Comment:        Type None current     Pain None historically   Orgasm    Masturbation    Libido              Pain Comment:        L LBP on occasion 1/10 today   R shoulder/arm pain Has aggravated this with exercises in the past               Other Comment:        Physical activity  Walking 4-5 days/wk  [facility]  Free weights on arms   PLOF    Living environment withhusband   Other osteoporosis   Sleep Fall asleep in recliner  5 hours  only  cpap             Systems Review:   Cord questions:  Pins and needles or tingling in both arms and both legs at the same time? denies  Problems with stumbling or falling? Denies     Cauda equina questions:  Problems with bowel and bladder control? Specifically retention? See above  Pins and needles or numbness in the saddle area? Denies     Review of systems:  General - (chills, night sweats, recent infection, fever, weight loss/gain, unexplained night pain, excessive fatigue): Denies  Do you have a history of cancer? Denies  Gastrointestinal system - (abdominal pain, bowel changes, nausea, vomiting, bloating): Denies  Cardiovascular system - (chest pain, palpitations, orthopnea, other): Denies  Respiratory system - (cough, SOB, sputum production, other): Denies  Musculoskeletal system: osteoporosis, vertebral fracture? Denies  Endocrine - (polyuria, polydipsia, heat or cold intolerance, other): Denies  Neurological - (numbness/tingling, falling/stumbling, HA, dizziness, diplopia, dysphagia/dysarthria, double vision, tinnitus, memory, drop attacks, other): Denies  Any long-term steroid use? Denies      Patient goals - reduce UI    OBJECTIVE FINDINGS:      ASSESSMENT   PELVIS/POSTURE            LUMBAR AROM    Flexion    Extension    rotation    Sidebending        HIP ROM    ER    IR    Flexion    Extension    Ankle ROM    Lower Extremity Strength    hip flexion    hip extension    Hip IR    Hip ER    Hip abduction    Knee    Ankle            SPECIAL TESTS    Hamstring length    Hip FADIR    Hip SCOUR    Trendelenburg    Slump test    SLR test    BOZENA            LUMBAR PALPATION    HIP PALPATION    OTHER PALPATION        SI JOINT TESTING     S/L Compression test    Thigh thrust test    Prone sacral thrust test    Gaenslen test        BALANCE    GAIT MECHANICS    RUNNING MECHANICS    SQUATTING MECHANICS    LUNGING MECHANICS    BED MOBILITY    SIT TO STAND    FLOOR TO STAND      Verbal consent give for internal  "evaluation and treatment. yes    Pelvic Floor MMT and Function INITIAL EVALUATION  2/22/23 pelvic eval F/U ASSESSMENT 3/13/23   Consent to eval and treat yes    Assessment position Hooklying, draped with sheet     EXTERNAL PFM EXAM No TTP B    PFM OBSERVATION     Skin integrity Atrophy, minor presence of labia minora    Contract Present, coordinates with breathing    Relax present    Bulge/pelvic drop present    Cough present    Perineal descent none    Vaginal wall laxity Pessary in place - did not assess    Light touch sensation intact    INTERNAL Assessment performed Internal vaginal     Levator Ani PERF       Power 3/5     Endurance 5     Repetitions 2 - well coordinated with breathing     Quick Flicks      Vaginal introitus loose    Preet-urethra No TTP    Levator palpation Min TTP  Dryness sensation  Mod tension    Puborectalis      Obturator Internus      Piriformis      TAILBONE          ABDOMEN     MMT strength      Diastasis Recti 1 finger width for 4\" preet-umbilicus    Pain Mild discomfort RA L>R  P! With pressure to that area following ab contraction    Breathing pattern WNL WNL    Hip flexors No TTP No TTP   Skin integumentary/incision lines Unremarkable, well healed lap incision lines unremarkable         PFDI OUTCOME MEASURE 70.8 total: 12.5 POPDI, 12.5 CRAD, 45.8 ADRNELL 119.8 total; 16.7 POPDI, 40.6 CRAD, 62.5 DARNELL   Adductors      Biofeedback                                                                                                                      TREATMENT LOG:  verbal consent for internal intervention: yes  Diagnosis  UI    Precautions       PT INITIAL EVALUATION:   2/14/23     PT PROGRESS NOTE:        Y/N Treatment Details: Time:   MODALITIES  95208   0 mins   Heat       Ice       THER ACT          96124   10 mins   OUTCOME MEASURES  PFDI    POC discussion y     Review of symptoms  Tests/Measures y       Falls Screen, Medication Review, VS, pain assessment y      Education:  h    h    h  h " "Pelvic floor anatomy/ purpose function    Intro to POP precautions: intro to breath with movements/exercise; no pushing/straining  Weight loss goal discussion  Use of vaginal estrogen discussion - consider topical coconut oil vs replens vs reveree    *Pt verbalized understanding of education and returned demonstration appropriately*    Bladder Education y Urgency suppression strategies - verbal, for during walking    Bowel Education      Toilet Posture edu/demo/practice      Postural edu y        h Squatting mechanics - bend from knees and hips; knee valgus; =WB, shift slightly to R; exhale with the lift  15x picking up bolster    Minimize kyphosis to minimize worsening POP and pressure on the bladder    HEP / HEP Review y  eval: TAC, TAC with add  2/22/23: bridge, hip abd, wall squat  2/27/23: KFO, shoulder row, SKTC str, piriformis str  3/27/23: hip ext<>march    THER EX         17626   37 mins   SUPINE THEREX:   h  h  h  h  h  h TAC with exhale 10x8 sec  TAC with bolster squeeze ADD 10x5 sec  TAC with hip abd, gtb 10x3 sec  TAC with bridge 15x3 sec, gtb at knees  TAC with march 5x3 alt reps  TAC with KFO    *all with coordinated breathing    SEATED THEREX:   y  y  y  y STS 2x6 with exhale - cued valgus L>R  TAC with bolster squeeze ADD 10x5 sec  TAC with hip abd, gtb 10x3 sec  TAC with KFO    STANDING THEREX:   h  h  h    h      y  y Wall squat 5x slowly with coordinated exhale  Shoulder row 20x5 sec hold  No money in from of mirror 20x5 sec    TRX mini squats 2x15 reps - cud knee valgus, full foot contact, \"sit back in chair\"    Standing march, otb at feet 20x  Tall plank at counter hip ext<>march 10x B    STRETCHING:   h  y  y Hamstring str in seated 30 sec B  SKTC 3x20 sec  Piriformis str 3x20 sec           NEURO RE-ED    61686   0 mins   Diaphragmatic breathing      Postural Re-Edu      Pelvic eval y edu on findings  Cued coordination of PFMC with exhale    Coordination with EMG Biofeedback  Pelvic floor " muscle strengthening with [employer] biofeedback unit, external sensors placed at 9 and 3 o'clock of anal opening, grounding electrode placed on ischial tuberosity:   - SUPINE:   - SEATED:  - STANDING:      Biofeedback Objective Findings  SUPINE REST:  SUPINE WORK:  SEATED REST:  SEATED WORK:  STANDING REST:  STANDING WORK:    MANUAL                 28424   0 mins   Joint Mobilization       Deep Tissue mobilizations External      Deep Tissue Mobilization Internal      IASTM/MFR/TrP Release                PLAN:   - give urge suppression handout?   - progress TE - use TRX? Practice squats   - add no money and squats to HEP

## 2023-03-27 NOTE — OP PT TREATMENT LOG
Pt is a 72 y.o. F with complaint of pelvic pressure and heaviness, using a pessary current; and complaints of daily UI.  Feels like sitting on a “ball” when not wearing a pessary. Using pessary right now and for last 1 year, with Dr. Mcdermott. Some bloody discharge prior to pessary, only noted one time since pessary in place.  Had recent US which was negative. Pt wants to avoid surgery for now.     Bladder: Comment:            Urination frequency Likely WNL, thinks about 8x/day   Urgency:  Can be urgent   Incontinence UUI first in the AM sometimes, maybe with running errands away from home    NAOMI with coughing/sneezing; lifting something heavy;   Usually a small amount of leak unless lifting something heavy    No UI stairs, walking, bed mobility   Pads Liner mult/day, will change when damp   Nocturia 5 hours without need to void, only sleeps 5 hours   Pain None   Emptying Complete  Pessary helped with starting the stream   Prolapse symptoms  Minimal with pessary, sometimes a discomfort, feels like she needs a break   Liquid consumption 3-4 quarts water  Decaf coffee in AM   UTI history    Irritants          Bowel: Comment:        Frequency Every other day   Urge WNL   Incontinence Avoids eggplant and food irritants   Emptying Complete, no pushing/straining   [place] stool smooth   Fiber Well rounded, fruits>veggies   Management Apricots   Pain None current    No bleeding current     OBGYN Comment:        Pregnancies 3   Births 3   Birth type Vaginal   Tears/ episiotomies Episiotomy x3   Surgery B ovaries removed due to cyst  2012  appendectomy   Menstruation Menopause    Last baby was 9#               Sexual Activity Comment:        Type None current     Pain None historically   Orgasm    Masturbation    Libido              Pain Comment:        L LBP on occasion 1/10 today   R shoulder/arm pain Has aggravated this with exercises in the past               Other Comment:        Physical activity  Walking 4-5  days/wk  [facility]  Free weights on arms   PLOF    Living environment withhusband   Other osteoporosis   Sleep Fall asleep in recliner  5 hours only  cpap             Systems Review:   Cord questions:  Pins and needles or tingling in both arms and both legs at the same time? denies  Problems with stumbling or falling? Denies     Cauda equina questions:  Problems with bowel and bladder control? Specifically retention? See above  Pins and needles or numbness in the saddle area? Denies     Review of systems:  General - (chills, night sweats, recent infection, fever, weight loss/gain, unexplained night pain, excessive fatigue): Denies  Do you have a history of cancer? Denies  Gastrointestinal system - (abdominal pain, bowel changes, nausea, vomiting, bloating): Denies  Cardiovascular system - (chest pain, palpitations, orthopnea, other): Denies  Respiratory system - (cough, SOB, sputum production, other): Denies  Musculoskeletal system: osteoporosis, vertebral fracture? Denies  Endocrine - (polyuria, polydipsia, heat or cold intolerance, other): Denies  Neurological - (numbness/tingling, falling/stumbling, HA, dizziness, diplopia, dysphagia/dysarthria, double vision, tinnitus, memory, drop attacks, other): Denies  Any long-term steroid use? Denies      Patient goals - reduce UI    OBJECTIVE FINDINGS:      ASSESSMENT   PELVIS/POSTURE            LUMBAR AROM    Flexion    Extension    rotation    Sidebending        HIP ROM    ER    IR    Flexion    Extension    Ankle ROM    Lower Extremity Strength    hip flexion    hip extension    Hip IR    Hip ER    Hip abduction    Knee    Ankle            SPECIAL TESTS    Hamstring length    Hip FADIR    Hip SCOUR    Trendelenburg    Slump test    SLR test    BOZENA            LUMBAR PALPATION    HIP PALPATION    OTHER PALPATION        SI JOINT TESTING     S/L Compression test    Thigh thrust test    Prone sacral thrust test    Gaenslen test        BALANCE    GAIT MECHANICS  "   RUNNING MECHANICS    SQUATTING MECHANICS    LUNGING MECHANICS    BED MOBILITY    SIT TO STAND    FLOOR TO STAND      Verbal consent give for internal evaluation and treatment. yes    Pelvic Floor MMT and Function INITIAL EVALUATION  2/22/23 pelvic eval F/U ASSESSMENT 3/13/23   Consent to eval and treat yes    Assessment position Hooklying, draped with sheet     EXTERNAL PFM EXAM No TTP B    PFM OBSERVATION     Skin integrity Atrophy, minor presence of labia minora    Contract Present, coordinates with breathing    Relax present    Bulge/pelvic drop present    Cough present    Perineal descent none    Vaginal wall laxity Pessary in place - did not assess    Light touch sensation intact    INTERNAL Assessment performed Internal vaginal     Levator Ani PERF       Power 3/5     Endurance 5     Repetitions 2 - well coordinated with breathing     Quick Flicks      Vaginal introitus loose    Preet-urethra No TTP    Levator palpation Min TTP  Dryness sensation  Mod tension    Puborectalis      Obturator Internus      Piriformis      TAILBONE          ABDOMEN     MMT strength      Diastasis Recti 1 finger width for 4\" preet-umbilicus    Pain Mild discomfort RA L>R  P! With pressure to that area following ab contraction    Breathing pattern WNL WNL    Hip flexors No TTP No TTP   Skin integumentary/incision lines Unremarkable, well healed lap incision lines unremarkable         PFDI OUTCOME MEASURE 70.8 total: 12.5 POPDI, 12.5 CRAD, 45.8 DARNELL 119.8 total; 16.7 POPDI, 40.6 CRAD, 62.5 DARNELL   Adductors      Biofeedback                                                                                                                      TREATMENT LOG:  verbal consent for internal intervention: yes  Diagnosis  UI    Precautions       PT INITIAL EVALUATION:   2/14/23     PT PROGRESS NOTE:        Y/N Treatment Details: Time:   MODALITIES  89270   0 mins   Heat       Ice       THER ACT          70006   10 mins   OUTCOME MEASURES  PFDI  " "  POC discussion y     Review of symptoms  Tests/Measures y       Falls Screen, Medication Review, VS, pain assessment y      Education:  h    h    h  h Pelvic floor anatomy/ purpose function    Intro to POP precautions: intro to breath with movements/exercise; no pushing/straining  Weight loss goal discussion  Use of vaginal estrogen discussion - consider topical coconut oil vs replens vs reveree    *Pt verbalized understanding of education and returned demonstration appropriately*    Bladder Education y Urgency suppression strategies - verbal, for during walking    Bowel Education      Toilet Posture edu/demo/practice      Postural edu y        h Squatting mechanics - bend from knees and hips; knee valgus; =WB, shift slightly to R; exhale with the lift  15x picking up bolster    Minimize kyphosis to minimize worsening POP and pressure on the bladder    HEP / HEP Review y  eval: TAC, TAC with add  2/22/23: bridge, hip abd, wall squat  2/27/23: KFO, shoulder row, SKTC str, piriformis str  3/27/23: hip ext<>march    THER EX         [id number]   37 mins   SUPINE THEREX:   h  h  h  h  h  h TAC with exhale 10x8 sec  TAC with bolster squeeze ADD 10x5 sec  TAC with hip abd, gtb 10x3 sec  TAC with bridge 15x3 sec, gtb at knees  TAC with march 5x3 alt reps  TAC with KFO    *all with coordinated breathing    SEATED THEREX:   y  y  y  y STS 2x6 with exhale - cued valgus L>R  TAC with bolster squeeze ADD 10x5 sec  TAC with hip abd, gtb 10x3 sec  TAC with KFO    STANDING THEREX:   h  h  h    h      y  y Wall squat 5x slowly with coordinated exhale  Shoulder row 20x5 sec hold  No money in from of mirror 20x5 sec    TRX mini squats 2x15 reps - cud knee valgus, full foot contact, \"sit back in chair\"    Standing march, otb at feet 20x  Tall plank at counter hip ext<>march 10x B    STRETCHING:   h  y  y Hamstring str in seated 30 sec B  SKTC 3x20 sec  Piriformis str 3x20 sec           NEURO RE-ED    43664   0 mins   Diaphragmatic " breathing      Postural Re-Edu      Pelvic eval y edu on findings  Cued coordination of PFMC with exhale    Coordination with EMG Biofeedback  Pelvic floor muscle strengthening with [employer] biofeedback unit, external sensors placed at 9 and 3 o'clock of anal opening, grounding electrode placed on ischial tuberosity:   - SUPINE:   - SEATED:  - STANDING:      Biofeedback Objective Findings  SUPINE REST:  SUPINE WORK:  SEATED REST:  SEATED WORK:  STANDING REST:  STANDING WORK:    MANUAL                 [id number]   0 mins   Joint Mobilization       Deep Tissue mobilizations External      Deep Tissue Mobilization Internal      IASTM/MFR/TrP Release                PLAN:   - give urge suppression handout?   - progress TE - use TRX? Practice squats   - add no money and squats to HEP

## 2023-04-05 ENCOUNTER — HOSPITAL ENCOUNTER (OUTPATIENT)
Dept: PHYSICAL THERAPY | Age: 73
Setting detail: THERAPIES SERIES
Discharge: HOME | End: 2023-04-05
Attending: UROLOGY
Payer: MEDICARE

## 2023-04-05 DIAGNOSIS — N32.81 OAB (OVERACTIVE BLADDER): Primary | ICD-10-CM

## 2023-04-05 PROCEDURE — 97110 THERAPEUTIC EXERCISES: CPT | Mod: GP

## 2023-04-05 NOTE — PROGRESS NOTES
PT DAILY NOTE FOR OUTPATIENT THERAPY    Patient: Georgina Briseno MRN: [id number]  : 1950 72 y.o.  Referring Physician: Rocio Latham MD  Date of Visit: 2023    Certification Dates: 23 through 05/15/23    Diagnosis:   1. OAB (overactive bladder)               Precautions:   Existing Precautions/Restrictions: no known precautions/restrictions      TODAY'S VISIT    Time In Session:  Start Time: 1615 (pt late to PT session)  Stop Time: 1700  Time Calculation (min): 45 min   History/Vitals/Pain/Encounter Info - 23 1625        Injury History/Precautions/Daily Required Info    Document Type daily treatment     Referring Physician Dr. Rocio Latham MD     Existing Precautions/Restrictions no known precautions/restrictions     History of present illness/functional impairment Pt very busy today, was rushing to get here in time. Pt says that she had a leak with lifting heavy boxes this week. Otherwise leakage was about the same this week. Pt had pessary cleaned and re-inserted this week without complaint.     Patient reported fall since last visit No        Pain Assessment    Currently in pain No/Denies        Pain Intervention    Intervention  n/a     Post Intervention Comments n/a                Daily Treatment Assessment and Plan - 23 1625        Daily Treatment Assessment and Plan    Progress toward goals Progressing     Daily Outcome Summary Pt educated on how to perform her routine arm exercises but with focus on breathing, core, and PFM. Continuing with a variety of functional style exercises to improve PFM strength in system with hip and core.                       Today's Treatment:    Pt is a 72 y.o. F with complaint of pelvic pressure and heaviness, using a pessary current; and complaints of daily UI.  Feels like sitting on a “ball” when not wearing a pessary. Using pessary right now and for last 1 year, with Dr. Mcdermott. Some bloody discharge prior to pessary, only noted one time  since pessary in place.  Had recent US which was negative. Pt wants to avoid surgery for now.     Bladder: Comment:            Urination frequency Likely WNL, thinks about 8x/day   Urgency:  Can be urgent   Incontinence UUI first in the AM sometimes, maybe with running errands away from home    NAOMI with coughing/sneezing; lifting something heavy;   Usually a small amount of leak unless lifting something heavy    No UI stairs, walking, bed mobility   Pads [place] mult/day, will change when damp   Nocturia 5 hours without need to void, only sleeps 5 hours   Pain None   Emptying Complete  Pessary helped with starting the stream   Prolapse symptoms  Minimal with pessary, sometimes a discomfort, feels like she needs a break   Liquid consumption 3-4 quarts water  Decaf coffee in AM   UTI history    Irritants          Bowel: Comment:        Frequency Every other day   Urge WNL   Incontinence Avoids eggplant and food irritants   Emptying Complete, no pushing/straining   [place] stool smooth   Fiber Well rounded, fruits>veggies   Management Apricots   Pain None current    No bleeding current     OBGYN Comment:        Pregnancies 3   Births 3   Birth type Vaginal   Tears/ episiotomies Episiotomy x3   Surgery B ovaries removed due to cyst  2012  appendectomy   Menstruation Menopause    Last baby was 9#               Sexual Activity Comment:        Type None current     Pain None historically   Orgasm    Masturbation    Libido              Pain Comment:        L LBP on occasion 1/10 today   R shoulder/arm pain Has aggravated this with exercises in the past               Other Comment:        Physical activity  Walking 4-5 days/wk  [facility] on [facility]  Free weights on arms   PLOF    Living environment withhusband   Other osteoporosis   Sleep Fall asleep in recliner  5 hours only  cpap             Systems Review:   Cord questions:  Pins and needles or tingling in both arms and both legs at the same time? denies  Problems with  stumbling or falling? Denies     Cauda equina questions:  Problems with bowel and bladder control? Specifically retention? See above  Pins and needles or numbness in the saddle area? Denies     Review of systems:  General - (chills, night sweats, recent infection, fever, weight loss/gain, unexplained night pain, excessive fatigue): Denies  Do you have a history of cancer? Denies  Gastrointestinal system - (abdominal pain, bowel changes, nausea, vomiting, bloating): Denies  Cardiovascular system - (chest pain, palpitations, orthopnea, other): Denies  Respiratory system - (cough, SOB, sputum production, other): Denies  Musculoskeletal system: osteoporosis, vertebral fracture? Denies  Endocrine - (polyuria, polydipsia, heat or cold intolerance, other): Denies  Neurological - (numbness/tingling, falling/stumbling, HA, dizziness, diplopia, dysphagia/dysarthria, double vision, tinnitus, memory, drop attacks, other): Denies  Any long-term steroid use? Denies      Patient goals - reduce UI    OBJECTIVE FINDINGS:      ASSESSMENT   PELVIS/POSTURE            LUMBAR AROM    Flexion    Extension    rotation    Sidebending        HIP ROM    ER    IR    Flexion    Extension    Ankle ROM    Lower Extremity Strength    hip flexion    hip extension    Hip IR    Hip ER    Hip abduction    Knee    Ankle            SPECIAL TESTS    Hamstring length    Hip FADIR    Hip SCOUR    Trendelenburg    Slump test    SLR test    BOZENA            LUMBAR PALPATION    HIP PALPATION    OTHER PALPATION        SI JOINT TESTING     S/L Compression test    Thigh thrust test    Prone sacral thrust test    Gaenslen test        BALANCE    GAIT MECHANICS    RUNNING MECHANICS    SQUATTING MECHANICS    LUNGING MECHANICS    BED MOBILITY    SIT TO STAND    FLOOR TO STAND      Verbal consent give for internal evaluation and treatment. yes    Pelvic Floor MMT and Function INITIAL EVALUATION  2/22/23 pelvic eval F/U ASSESSMENT 3/13/23   Consent to eval and treat  "yes    Assessment position Hooklying, draped with sheet     EXTERNAL PFM EXAM No TTP B    PFM OBSERVATION     Skin integrity Atrophy, minor presence of labia minora    Contract Present, coordinates with breathing    Relax present    Bulge/pelvic drop present    Cough present    Perineal descent none    Vaginal wall laxity Pessary in place - did not assess    Light touch sensation intact    INTERNAL Assessment performed Internal vaginal     Levator Ani PERF       Power 3/5     Endurance 5     Repetitions 2 - well coordinated with breathing     Quick Flicks      Vaginal introitus loose    Preet-urethra No TTP    Levator palpation Min TTP  Dryness sensation  Mod tension    Puborectalis      Obturator Internus      Piriformis      TAILBONE          ABDOMEN     MMT strength      Diastasis Recti 1 finger width for 4\" preet-umbilicus    Pain Mild discomfort RA L>R  P! With pressure to that area following ab contraction    Breathing pattern WNL WNL    Hip flexors No TTP No TTP   Skin integumentary/incision lines Unremarkable, well healed lap incision lines unremarkable         PFDI OUTCOME MEASURE 70.8 total: 12.5 POPDI, 12.5 CRAD, 45.8 DARNELL 119.8 total; 16.7 POPDI, 40.6 CRAD, 62.5 DARNELL   Adductors      Biofeedback                                                                                                                      TREATMENT LOG:  verbal consent for internal intervention: yes  Diagnosis  UI    Precautions       PT INITIAL EVALUATION:   2/14/23     PT PROGRESS NOTE:        Y/N Treatment Details: Time:   MODALITIES  47439   0 mins   Heat       Ice       THER ACT          39610   0-7 mins   OUTCOME MEASURES  PFDI    POC discussion      Review of symptoms  Tests/Measures y       Falls Screen, Medication Review, VS, pain assessment y      Education:  h    h    h  h Pelvic floor anatomy/ purpose function    Intro to POP precautions: intro to breath with movements/exercise; no pushing/straining  Weight loss goal " "discussion  Use of vaginal estrogen discussion - consider topical coconut oil vs replens vs reveree    *Pt verbalized understanding of education and returned demonstration appropriately*    Bladder Education h Urgency suppression strategies - verbal, for during walking    Bowel Education      Toilet Posture edu/demo/practice      Postural edu y        h Squatting mechanics - bend from knees and hips; knee valgus; =WB, shift slightly to R; exhale with the lift  15x picking up bolster    Minimize kyphosis to minimize worsening POP and pressure on the bladder    HEP / HEP Review y  eval: TAC, TAC with add  2/22/23: bridge, hip abd, wall squat  2/27/23: KFO, shoulder row, SKTC str, piriformis str  3/27/23: hip ext<>march 4/5/23: tricep curl in supine    THER EX         [id number]   45 mins   SUPINE THEREX:   h  h  y  y  h  y    y TAC with exhale 10x8 sec  TAC with bolster squeeze ADD 10x5 sec  TAC with hip abd, gtb 10x3 sec  TAC with bridge 15x3 sec, gtb at knees  TAC with march 5x3 alt reps  TAC with KFO    tricep curl - no wt R, 3# L, 10-15 total    *all with coordinated breathing    SEATED THEREX:   y  h  h  h STS 2x6 with exhale - cued valgus L>R  TAC with bolster squeeze ADD 10x5 sec  TAC with hip abd, gtb 10x3 sec  TAC with KFO    STANDING THEREX:   h  y  y    y  y  y      h      h  h Wall squat 5x slowly with coordinated exhale  Shoulder row 20x5 sec hold  No money in front of mirror 20x5 sec    Shoulder flexion otb 15x  Shoulder abd otb 15x  Bicep curld otb 15x        TRX mini squats 2x15 reps - cud knee valgus, full foot contact, \"sit back in chair\"    Standing march, otb at feet 20x  Tall plank at counter hip ext<>march 10x B    STRETCHING:   h  y  y Hamstring str in seated 30 sec B  SKTC 3x20 sec  Piriformis str 3x20 sec           NEURO RE-ED    63599   0 mins   Diaphragmatic breathing      Postural Re-Edu      Pelvic eval y edu on findings  Cued coordination of PFMC with exhale    Coordination with EMG " Biofeedback  Pelvic floor muscle strengthening with [employer] biofeedback unit, external sensors placed at 9 and 3 o'clock of anal opening, grounding electrode placed on ischial tuberosity:   - SUPINE:   - SEATED:  - STANDING:      Biofeedback Objective Findings  SUPINE REST:  SUPINE WORK:  SEATED REST:  SEATED WORK:  STANDING REST:  STANDING WORK:    MANUAL                 [id number]   0 mins   Joint Mobilization       Deep Tissue mobilizations External      Deep Tissue Mobilization Internal      IASTM/MFR/TrP Release                PLAN:   - give urge suppression handout?   - progress TE - use TRX? Practice squats - cont

## 2023-04-05 NOTE — OP PT TREATMENT LOG
Pt is a 72 y.o. F with complaint of pelvic pressure and heaviness, using a pessary current; and complaints of daily UI.  Feels like sitting on a “ball” when not wearing a pessary. Using pessary right now and for last 1 year, with Dr. Mcdermott. Some bloody discharge prior to pessary, only noted one time since pessary in place.  Had recent US which was negative. Pt wants to avoid surgery for now.     Bladder: Comment:            Urination frequency Likely WNL, thinks about 8x/day   Urgency:  Can be urgent   Incontinence UUI first in the AM sometimes, maybe with running errands away from home    NAOMI with coughing/sneezing; lifting something heavy;   Usually a small amount of leak unless lifting something heavy    No UI stairs, walking, bed mobility   Pads Liner mult/day, will change when damp   Nocturia 5 hours without need to void, only sleeps 5 hours   Pain None   Emptying Complete  Pessary helped with starting the stream   Prolapse symptoms  Minimal with pessary, sometimes a discomfort, feels like she needs a break   Liquid consumption 3-4 quarts water  Decaf coffee in AM   UTI history    Irritants          Bowel: Comment:        Frequency Every other day   Urge WNL   Incontinence Avoids eggplant and food irritants   Emptying Complete, no pushing/straining   [place] stool smooth   Fiber Well rounded, fruits>veggies   Management Apricots   Pain None current    No bleeding current     OBGYN Comment:        Pregnancies 3   Births 3   Birth type Vaginal   Tears/ episiotomies Episiotomy x3   Surgery B ovaries removed due to cyst  2012  appendectomy   Menstruation Menopause    Last baby was 9#               Sexual Activity Comment:        Type None current     Pain None historically   Orgasm    Masturbation    Libido              Pain Comment:        L LBP on occasion 1/10 today   R shoulder/arm pain Has aggravated this with exercises in the past               Other Comment:        Physical activity  Walking 4-5  days/wk  [facility]  Free weights on arms   PLOF    Living environment withhusband   Other osteoporosis   Sleep Fall asleep in recliner  5 hours only  cpap             Systems Review:   Cord questions:  Pins and needles or tingling in both arms and both legs at the same time? denies  Problems with stumbling or falling? Denies     Cauda equina questions:  Problems with bowel and bladder control? Specifically retention? See above  Pins and needles or numbness in the saddle area? Denies     Review of systems:  General - (chills, night sweats, recent infection, fever, weight loss/gain, unexplained night pain, excessive fatigue): Denies  Do you have a history of cancer? Denies  Gastrointestinal system - (abdominal pain, bowel changes, nausea, vomiting, bloating): Denies  Cardiovascular system - (chest pain, palpitations, orthopnea, other): Denies  Respiratory system - (cough, SOB, sputum production, other): Denies  Musculoskeletal system: osteoporosis, vertebral fracture? Denies  Endocrine - (polyuria, polydipsia, heat or cold intolerance, other): Denies  Neurological - (numbness/tingling, falling/stumbling, HA, dizziness, diplopia, dysphagia/dysarthria, double vision, tinnitus, memory, drop attacks, other): Denies  Any long-term steroid use? Denies      Patient goals - reduce UI    OBJECTIVE FINDINGS:      ASSESSMENT   PELVIS/POSTURE            LUMBAR AROM    Flexion    Extension    rotation    Sidebending        HIP ROM    ER    IR    Flexion    Extension    Ankle ROM    Lower Extremity Strength    hip flexion    hip extension    Hip IR    Hip ER    Hip abduction    Knee    Ankle            SPECIAL TESTS    Hamstring length    Hip FADIR    Hip SCOUR    Trendelenburg    Slump test    SLR test    BOZENA            LUMBAR PALPATION    HIP PALPATION    OTHER PALPATION        SI JOINT TESTING     S/L Compression test    Thigh thrust test    Prone sacral thrust test    Gaenslen test        BALANCE    GAIT MECHANICS  "   RUNNING MECHANICS    SQUATTING MECHANICS    LUNGING MECHANICS    BED MOBILITY    SIT TO STAND    FLOOR TO STAND      Verbal consent give for internal evaluation and treatment. yes    Pelvic Floor MMT and Function INITIAL EVALUATION  2/22/23 pelvic eval F/U ASSESSMENT 3/13/23   Consent to eval and treat yes    Assessment position Hooklying, draped with sheet     EXTERNAL PFM EXAM No TTP B    PFM OBSERVATION     Skin integrity Atrophy, minor presence of labia minora    Contract Present, coordinates with breathing    Relax present    Bulge/pelvic drop present    Cough present    Perineal descent none    Vaginal wall laxity Pessary in place - did not assess    Light touch sensation intact    INTERNAL Assessment performed Internal vaginal     Levator Ani PERF       Power 3/5     Endurance 5     Repetitions 2 - well coordinated with breathing     Quick Flicks      Vaginal introitus loose    Preet-urethra No TTP    Levator palpation Min TTP  Dryness sensation  Mod tension    Puborectalis      Obturator Internus      Piriformis      TAILBONE          ABDOMEN     MMT strength      Diastasis Recti 1 finger width for 4\" preet-umbilicus    Pain Mild discomfort RA L>R  P! With pressure to that area following ab contraction    Breathing pattern WNL WNL    Hip flexors No TTP No TTP   Skin integumentary/incision lines Unremarkable, well healed lap incision lines unremarkable         PFDI OUTCOME MEASURE 70.8 total: 12.5 POPDI, 12.5 CRAD, 45.8 DARNELL 119.8 total; 16.7 POPDI, 40.6 CRAD, 62.5 DARNELL   Adductors      Biofeedback                                                                                                                      TREATMENT LOG:  verbal consent for internal intervention: yes  Diagnosis  UI    Precautions       PT INITIAL EVALUATION:   2/14/23     PT PROGRESS NOTE:        Y/N Treatment Details: Time:   MODALITIES  28352   0 mins   Heat       Ice       THER ACT          72270   0-7 mins   OUTCOME MEASURES  PFDI  " "  POC discussion      Review of symptoms  Tests/Measures y       Falls Screen, Medication Review, VS, pain assessment y      Education:  h    h    h  h Pelvic floor anatomy/ purpose function    Intro to POP precautions: intro to breath with movements/exercise; no pushing/straining  Weight loss goal discussion  Use of vaginal estrogen discussion - consider topical coconut oil vs replens vs reveree    *Pt verbalized understanding of education and returned demonstration appropriately*    Bladder Education h Urgency suppression strategies - verbal, for during walking    Bowel Education      Toilet Posture edu/demo/practice      Postural edu y        h Squatting mechanics - bend from knees and hips; knee valgus; =WB, shift slightly to R; exhale with the lift  15x picking up bolster    Minimize kyphosis to minimize worsening POP and pressure on the bladder    HEP / HEP Review y  eval: TAC, TAC with add  2/22/23: bridge, hip abd, wall squat  2/27/23: KFO, shoulder row, SKTC str, piriformis str  3/27/23: hip ext<>march 4/5/23: tricep curl in supine    THER EX         09714   45 mins   SUPINE THEREX:   h  h  y  y  h  y    y TAC with exhale 10x8 sec  TAC with bolster squeeze ADD 10x5 sec  TAC with hip abd, gtb 10x3 sec  TAC with bridge 15x3 sec, gtb at knees  TAC with march 5x3 alt reps  TAC with KFO    tricep curl - no wt R, 3# L, 10-15 total    *all with coordinated breathing    SEATED THEREX:   y  h  h  h STS 2x6 with exhale - cued valgus L>R  TAC with bolster squeeze ADD 10x5 sec  TAC with hip abd, gtb 10x3 sec  TAC with KFO    STANDING THEREX:   h  y  y    y  y  y      h      h  h Wall squat 5x slowly with coordinated exhale  Shoulder row 20x5 sec hold  No money in front of mirror 20x5 sec    Shoulder flexion otb 15x  Shoulder abd otb 15x  Bicep curld otb 15x        TRX mini squats 2x15 reps - cud knee valgus, full foot contact, \"sit back in chair\"    Standing march, otb at feet 20x  Tall plank at counter hip ext<>march " 10x B    STRETCHING:   h  y  y Hamstring str in seated 30 sec B  SKTC 3x20 sec  Piriformis str 3x20 sec           NEURO RE-ED    95691   0 mins   Diaphragmatic breathing      Postural Re-Edu      Pelvic eval y edu on findings  Cued coordination of PFMC with exhale    Coordination with EMG Biofeedback  Pelvic floor muscle strengthening with Prometheus biofeedback unit, external sensors placed at 9 and 3 o'clock of anal opening, grounding electrode placed on ischial tuberosity:   - SUPINE:   - SEATED:  - STANDING:      Biofeedback Objective Findings  SUPINE REST:  SUPINE WORK:  SEATED REST:  SEATED WORK:  STANDING REST:  STANDING WORK:    MANUAL                 19964   0 mins   Joint Mobilization       Deep Tissue mobilizations External      Deep Tissue Mobilization Internal      IASTM/MFR/TrP Release                PLAN:   - give urge suppression handout?   - progress TE - use TRX? Practice squats - cont

## 2023-04-17 ENCOUNTER — HOSPITAL ENCOUNTER (OUTPATIENT)
Dept: PHYSICAL THERAPY | Age: 73
Setting detail: THERAPIES SERIES
Discharge: HOME | End: 2023-04-17
Attending: UROLOGY
Payer: MEDICARE

## 2023-04-17 DIAGNOSIS — N32.81 OAB (OVERACTIVE BLADDER): Primary | ICD-10-CM

## 2023-04-17 PROCEDURE — 97530 THERAPEUTIC ACTIVITIES: CPT | Mod: GP

## 2023-04-17 PROCEDURE — 97110 THERAPEUTIC EXERCISES: CPT | Mod: GP

## 2023-04-17 NOTE — LETTER
[address]  [facility] [place] [address]  [facility] Fax: [phone]    PHYSICAL THERAPY UPDATE    Patient Name: Georgina Briseno    Provider: Deann Waters PT  Referring Provider: Rocio Latham MD  PCP: Don Rand MD  Payor: Payor: MEDICARE / Plan: MEDICARE PART A & B / Product Type: Medicare /   Medical Diagnosis: OAB (overactive bladder) [N32.81]     Dear Dr. Latham,    Thank you for the referral of your patient Georgina Briseno for physical therapy. I am writing to you today to provide you an update on the status of your patient. Please review the latest progress note below and the goals being addressed during this plan of care.     If you have any questions or concerns, please feel free to contact me. Once again, thank you for your referral and the opportunity to work with your patient.     Sincerely,  Deann Waters PT    Rehab Potential:          PT PROGRESS NOTE FOR OUTPATIENT THERAPY    Patient: Georgina Briseno MRN: [id number]  : 1950 72 y.o.  Referring Physician: Rocio Latham MD  Date of Visit: 2023      Certification Dates: 23 through 05/15/23    Recommended Frequency & Duration:  1 time/week for up to 3 months          Diagnosis:   1. OAB (overactive bladder)        Chief Complaints:  Chief Complaint   Patient presents with   • Pain   • Other     Bowel and bladder function                TODAY'S VISIT:    Time In Session:  Start Time: 0900  Stop Time: 09  Time Calculation (min): 57 min   General Information - 23 0918        Session Details    Document Type progress note     Mode of Treatment individual therapy        General Information    Patient/Family/Caregiver Comments/Observations Pt says that she had a little UUI while on walk - after 16 fl oz water and 1 coffee. Wonders if this was too much fluid before the walk. A couple small moments of NAOMI with a sneeze. Wearing the smallest size liner and usually just lightly damp. Pt  drinks 6x16 fl oz water/day.                Daily Falls Screen - 04/17/23 0918        Daily Falls Assessment    Patient reported fall since last visit No                Pain/Vitals - 04/17/23 0918        Pain Assessment    Currently in pain No/Denies        Pain Intervention    Intervention  n/a     Post Intervention Comments n/a                PT - 04/17/23 0918        Physical Therapy    Physical Therapy Specialty Pelvic Floor Program: Age 14+        PT Plan    Frequency of treatment 1 time/week     PT Duration 3 months     PT Cert From 02/14/23     PT Cert To 05/15/23     Date PT POC was sent to provider 02/14/23     Signed PT Plan of Care received?  Yes                Assessment and Plan - 04/17/23 0918        Assessment    Plan of Care reviewed and patient/family in agreement Yes     System Pathology/Pathophysiology Noted musculoskeletal;neuromuscular;integumentary     Clinical Assessment Pt is a 72 y.o. F with complaints of mixed UI. Outcome measure is improving, indicating improvement in her symptoms. Continuing posture and  instruction to improve UI with functional movements. Continuing general core and pelvic strengthening in all positions so that pt may meet her UI goals.                 OBJECTIVE MEASUREMENTS/DATA:        Outcome Measures        2/14/2023    08:13 3/13/2023    08:10 4/17/2023    09:18   PT SUBJECTIVE Outcome Measures   Other PFDI 70.8 total PFDI 119.8 total PFDI 41.6 total       Today's Treatment::    Education provided:  Yes: See treatment log for details of education provided    Pt is a 72 y.o. F with complaint of pelvic pressure and heaviness, using a pessary current; and complaints of daily UI.  Feels like sitting on a “ball” when not wearing a pessary. Using pessary right now and for last 1 year, with Dr. Mcdermott. Some bloody discharge prior to pessary, only noted one time since pessary in place.  Had recent US which was negative. Pt wants to avoid surgery for now.      Bladder: Comment:            Urination frequency Likely WNL, thinks about 8x/day   Urgency:  Can be urgent   Incontinence UUI first in the AM sometimes, maybe with running errands away from home    NAOMI with coughing/sneezing; lifting something heavy;   Usually a small amount of leak unless lifting something heavy    No UI stairs, walking, bed mobility   Pads Liner mult/day, will change when damp   Nocturia 5 hours without need to void, only sleeps 5 hours   Pain None   Emptying Complete  Pessary helped with starting the stream   Prolapse symptoms  Minimal with pessary, sometimes a discomfort, feels like she needs a break   Liquid consumption 3-4 quarts water  Decaf coffee in AM   UTI history    Irritants          Bowel: Comment:        Frequency Every other day   Urge WNL   Incontinence Avoids eggplant and food irritants   Emptying Complete, no pushing/straining   [place] stool smooth   Fiber Well rounded, fruits>veggies   Management Apricots   Pain None current    No bleeding current     OBGYN Comment:        Pregnancies 3   Births 3   Birth type Vaginal   Tears/ episiotomies Episiotomy x3   Surgery B ovaries removed due to cyst  2012  appendectomy   Menstruation Menopause    Last baby was 9#               Sexual Activity Comment:        Type None current     Pain None historically   Orgasm    Masturbation    Libido              Pain Comment:        L LBP on occasion 1/10 today   R shoulder/arm pain Has aggravated this with exercises in the past               Other Comment:        Physical activity  Walking 4-5 days/wk  [facility]  Free weights on arms   PLOF    Living environment withhusband   Other osteoporosis   Sleep Fall asleep in recliner  5 hours only  cpap             Systems Review:   Cord questions:  Pins and needles or tingling in both arms and both legs at the same time? denies  Problems with stumbling or falling? Denies     Cauda equina questions:  Problems with bowel and bladder  control? Specifically retention? See above  Pins and needles or numbness in the saddle area? Denies     Review of systems:  General - (chills, night sweats, recent infection, fever, weight loss/gain, unexplained night pain, excessive fatigue): Denies  Do you have a history of cancer? Denies  Gastrointestinal system - (abdominal pain, bowel changes, nausea, vomiting, bloating): Denies  Cardiovascular system - (chest pain, palpitations, orthopnea, other): Denies  Respiratory system - (cough, SOB, sputum production, other): Denies  Musculoskeletal system: osteoporosis, vertebral fracture? Denies  Endocrine - (polyuria, polydipsia, heat or cold intolerance, other): Denies  Neurological - (numbness/tingling, falling/stumbling, HA, dizziness, diplopia, dysphagia/dysarthria, double vision, tinnitus, memory, drop attacks, other): Denies  Any long-term steroid use? Denies      Patient goals - reduce UI    OBJECTIVE FINDINGS:      ASSESSMENT   PELVIS/POSTURE            LUMBAR AROM    Flexion    Extension    rotation    Sidebending        HIP ROM    ER    IR    Flexion    Extension    Ankle ROM    Lower Extremity Strength    hip flexion    hip extension    Hip IR    Hip ER    Hip abduction    Knee    Ankle            SPECIAL TESTS    Hamstring length    Hip FADIR    Hip SCOUR    Trendelenburg    Slump test    SLR test    BOZENA            LUMBAR PALPATION    HIP PALPATION    OTHER PALPATION        SI JOINT TESTING     S/L Compression test    Thigh thrust test    Prone sacral thrust test    Gaenslen test        BALANCE    GAIT MECHANICS    RUNNING MECHANICS    SQUATTING MECHANICS Leans to the L; focus on heel contact throughout   LUNGING MECHANICS    BED MOBILITY    SIT TO STAND    FLOOR TO STAND      Verbal consent give for internal evaluation and treatment. yes    Pelvic Floor MMT and Function INITIAL EVALUATION  2/22/23 pelvic eval F/U ASSESSMENT 4/17/23   Consent to eval and treat yes    Assessment position Hooklying, draped  "with sheet     EXTERNAL PFM EXAM No TTP B    PFM OBSERVATION     Skin integrity Atrophy, minor presence of labia minora    Contract Present, coordinates with breathing    Relax present    Bulge/pelvic drop present    Cough present    Perineal descent none    Vaginal wall laxity Pessary in place - did not assess    Light touch sensation intact    INTERNAL Assessment performed Internal vaginal     Levator Ani PERF       Power 3/5     Endurance 5     Repetitions 2 - well coordinated with breathing     Quick Flicks      Vaginal introitus loose    Preet-urethra No TTP    Levator palpation Min TTP  Dryness sensation  Mod tension    Puborectalis      Obturator Internus      Piriformis      TAILBONE          ABDOMEN     MMT strength      Diastasis Recti 1 finger width for 4\" preet-umbilicus    Pain Mild discomfort RA L>R  P! With pressure to that area following ab contraction    Breathing pattern WNL WNL    Hip flexors No TTP No TTP   Skin integumentary/incision lines Unremarkable, well healed lap incision lines unremarkable         PFDI OUTCOME MEASURE 70.8 total: 12.5 POPDI, 12.5 CRAD, 45.8 DARNELL 41.6 total; 8.3 POPDI, 12.5 CRAD, 20.8 DARNELL   Adductors      Biofeedback                                                                                                                      TREATMENT LOG:  verbal consent for internal intervention: yes  Diagnosis  UI    Precautions       PT INITIAL EVALUATION:   2/14/23     PT PROGRESS NOTE:        Y/N Treatment Details: Time:   MODALITIES  51147   0 mins   Heat       Ice       THER ACT          62528   10 mins   OUTCOME MEASURES y PFDI    POC discussion      Review of symptoms  Tests/Measures y       Falls Screen, Medication Review, VS, pain assessment y      Education:  h    h    h  h      y  y Pelvic floor anatomy/ purpose function    Intro to POP precautions: intro to breath with movements/exercise; no pushing/straining  Weight loss goal discussion  Use of vaginal estrogen " "discussion - consider topical coconut oil vs replens vs reveree    Consider no coffee before walks, just the plain water  Be wary of lemon in water with bladder urgency and frequency    *Pt verbalized understanding of education and returned demonstration appropriately*    Bladder Education h Urgency suppression strategies - verbal, for during walking    Bowel Education      Toilet Posture edu/demo/practice      Postural edu y        y Squatting mechanics - bend from knees and hips; knee valgus; =WB, shift slightly to R; exhale with the lift  15x picking up bolster, 15x in isolation    Minimize kyphosis to minimize worsening POP and pressure on the bladder    HEP / HEP Review y  eval: TAC, TAC with add  2/22/23: bridge, hip abd, wall squat  2/27/23: KFO, shoulder row, SKTC str, piriformis str  3/27/23: hip ext<>march 4/5/23: tricep curl in supine  4/17/23:     THER EX         68619   45 mins   SUPINE THEREX:   h  y  y  y  y  y  y  h    h TAC with exhale 10x8 sec  TAC with bolster squeeze ADD 10x5 sec  TAC with hip abd, gtb 10x3 sec  TAC with bridge 10x3 sec, gtb at knees  TAC with bridge ball squeeze 10x5 sec  TAC with march 5x3 alt reps, ball squeeze bn hands  TAC with march and ball squeeze bn hands and thighs  TAC with KFO    tricep curl - no wt R, 3# L, 10-15 total    *all with coordinated breathing    SEATED THEREX:   y  h  h  h STS 2x6 with exhale - cued valgus L>R  TAC with bolster squeeze ADD 10x5 sec  TAC with hip abd, gtb 10x3 sec  TAC with KFO    STANDING THEREX:   h  y  h    h  h  h        h      h  h Wall squat 5x slowly with coordinated exhale  Shoulder row 20x5 sec hold, in tandem 10x B  No money in front of mirror 20x5 sec    Shoulder flexion otb 15x  Shoulder abd otb 15x  Bicep curld otb 15x        TRX mini squats 2x15 reps - cud knee valgus, full foot contact, \"sit back in chair\"    Standing march, otb at feet 20x  Tall plank at counter hip ext<>march 10x B    STRETCHING:   h  y  y Hamstring str in " seated 30 sec B  SKTC 3x20 sec  Piriformis str 3x20 sec           NEURO RE-ED    82220   0 mins   Diaphragmatic breathing      Postural Re-Edu      Pelvic eval y edu on findings  Cued coordination of PFMC with exhale    Coordination with EMG Biofeedback  Pelvic floor muscle strengthening with Prometheus biofeedback unit, external sensors placed at 9 and 3 o'clock of anal opening, grounding electrode placed on ischial tuberosity:   - SUPINE:   - SEATED:  - STANDING:      Biofeedback Objective Findings  SUPINE REST:  SUPINE WORK:  SEATED REST:  SEATED WORK:  STANDING REST:  STANDING WORK:    MANUAL                 22389   0 mins   Joint Mobilization       Deep Tissue mobilizations External      Deep Tissue Mobilization Internal      IASTM/MFR/TrP Release                PLAN:   - give urge suppression handout?   - progress TE - use TRX? Practice squats - cont             Goals Addressed                 This Visit's Progress    • Mutually agreed upon pain goal   On track     Mutually agreed upon pain goal: n/a    PATIENT STATED: reduce UI      • Pelvic PT Goals   On track     Pt will be I with initial pain management strategies and PFM relaxation/coordination/strengthening exercises to improve pt's bladder function: 3 wks MET    Pt will be able to reduce urinary freq to 3 hours ave, 90% of the time, for improved bladder function and attention to work/errand task: 6 wks IMPROVING    Pt will be able to successfully demonstrate urge suppression strategies to delay urinary urgency for 5 min, 90% of the time, for improved bladder function and attention to errand task: 8 wks IMPROVING    Pt will be I with diaphragmatic breathing for reducing tension, stress, pelvic pain complaints: 4 wks MET    Pt will demonstrate the knack with cough/laugh/sneeze, 90% of the time, to reduce risk of urinary incontinence: 8 wks IMPROVING    Pt will report urinary leakage no greater than 2x/day, for improved bladder function: 8 wks  IMPROVING    Pt will note 50% reduction in pad wetness, indicating improvement in UI: 12 wks IMPROVING    Pt will report urinary leakage no greater than 1x/wk, for improved bladder function: 12 wks    PFDI score will improve to below 55.8, indicating improvements in bladder function and QOL (70.8 on IE): 12 wks     Pt will be I with progression of PFM strengthening exercises to maintain/improve strength and bladder function: 12 wks

## 2023-04-17 NOTE — PROGRESS NOTES
PT PROGRESS NOTE FOR OUTPATIENT THERAPY    Patient: Georgina Briseno MRN: [id number]  : 1950 72 y.o.  Referring Physician: Rocio Latham MD  Date of Visit: 2023      Certification Dates: 23 through 05/15/23    Recommended Frequency & Duration:  1 time/week for up to 3 months          Diagnosis:   1. OAB (overactive bladder)        Chief Complaints:  Chief Complaint   Patient presents with   • Pain   • Other     Bowel and bladder function                TODAY'S VISIT:    Time In Session:  Start Time: 0900  Stop Time: 957  Time Calculation (min): 57 min   General Information - 23        Session Details    Document Type progress note     Mode of Treatment individual therapy        General Information    Patient/Family/Caregiver Comments/Observations Pt says that she had a little UUI while on walk - after 16 fl oz water and 1 coffee. Wonders if this was too much fluid before the walk. A couple small moments of NAOMI with a sneeze. Wearing the smallest size liner and usually just lightly damp. Pt drinks 6x16 fl oz water/day.                Daily Falls Screen - 23        Daily Falls Assessment    Patient reported fall since last visit No                Pain/Vitals - 23        Pain Assessment    Currently in pain No/Denies        Pain Intervention    Intervention  n/a     Post Intervention Comments n/a                PT - 23        Physical Therapy    Physical Therapy Specialty Pelvic Floor Program: Age 14+        PT Plan    Frequency of treatment 1 time/week     PT Duration 3 months     PT Cert From 23     PT Cert To 05/15/23     Date PT POC was sent to provider 23     Signed PT Plan of Care received?  Yes                Assessment and Plan - 23        Assessment    Plan of Care reviewed and patient/family in agreement Yes     System Pathology/Pathophysiology Noted musculoskeletal;neuromuscular;integumentary     Clinical Assessment  Pt is a 72 y.o. F with complaints of mixed UI. Outcome measure is improving, indicating improvement in her symptoms. Continuing posture and  instruction to improve UI with functional movements. Continuing general core and pelvic strengthening in all positions so that pt may meet her UI goals.                 OBJECTIVE MEASUREMENTS/DATA:        Outcome Measures        2/14/2023    08:13 3/13/2023    08:10 4/17/2023    09:18   PT SUBJECTIVE Outcome Measures   Other PFDI 70.8 total PFDI 119.8 total PFDI 41.6 total       Today's Treatment::    Education provided:  Yes: See treatment log for details of education provided    Pt is a 72 y.o. F with complaint of pelvic pressure and heaviness, using a pessary current; and complaints of daily UI.  Feels like sitting on a “ball” when not wearing a pessary. Using pessary right now and for last 1 year, with Dr. Mcdermott. Some bloody discharge prior to pessary, only noted one time since pessary in place.  Had recent US which was negative. Pt wants to avoid surgery for now.     Bladder: Comment:            Urination frequency Likely WNL, thinks about 8x/day   Urgency:  Can be urgent   Incontinence UUI first in the AM sometimes, maybe with running errands away from home    NAOMI with coughing/sneezing; lifting something heavy;   Usually a small amount of leak unless lifting something heavy    No UI stairs, walking, bed mobility   Pads Liner mult/day, will change when damp   Nocturia 5 hours without need to void, only sleeps 5 hours   Pain None   Emptying Complete  Pessary helped with starting the stream   Prolapse symptoms  Minimal with pessary, sometimes a discomfort, feels like she needs a break   Liquid consumption 3-4 quarts water  Decaf coffee in AM   UTI history    Irritants          Bowel: Comment:        Frequency Every other day   Urge WNL   Incontinence Avoids eggplant and food irritants   Emptying Complete, no pushing/straining   [place] stool smooth   Fiber  Well rounded, fruits>veggies   Management Apricots   Pain None current    No bleeding current     OBGYN Comment:        Pregnancies 3   Births 3   Birth type Vaginal   Tears/ episiotomies Episiotomy x3   Surgery B ovaries removed due to cyst  2012  appendectomy   Menstruation Menopause    Last baby was 9#               Sexual Activity Comment:        Type None current     Pain None historically   Orgasm    Masturbation    Libido              Pain Comment:        L LBP on occasion 1/10 today   R shoulder/arm pain Has aggravated this with exercises in the past               Other Comment:        Physical activity  Walking 4-5 days/wk  [facility]  Free weights on arms   PLOF    Living environment withhusband   Other osteoporosis   Sleep Fall asleep in recliner  5 hours only  cpap             Systems Review:   Cord questions:  Pins and needles or tingling in both arms and both legs at the same time? denies  Problems with stumbling or falling? Denies     Cauda equina questions:  Problems with bowel and bladder control? Specifically retention? See above  Pins and needles or numbness in the saddle area? Denies     Review of systems:  General - (chills, night sweats, recent infection, fever, weight loss/gain, unexplained night pain, excessive fatigue): Denies  Do you have a history of cancer? Denies  Gastrointestinal system - (abdominal pain, bowel changes, nausea, vomiting, bloating): Denies  Cardiovascular system - (chest pain, palpitations, orthopnea, other): Denies  Respiratory system - (cough, SOB, sputum production, other): Denies  Musculoskeletal system: osteoporosis, vertebral fracture? Denies  Endocrine - (polyuria, polydipsia, heat or cold intolerance, other): Denies  Neurological - (numbness/tingling, falling/stumbling, HA, dizziness, diplopia, dysphagia/dysarthria, double vision, tinnitus, memory, drop attacks, other): Denies  Any long-term steroid use? Denies      Patient goals - reduce  "UI    OBJECTIVE FINDINGS:      ASSESSMENT   PELVIS/POSTURE            LUMBAR AROM    Flexion    Extension    rotation    Sidebending        HIP ROM    ER    IR    Flexion    Extension    Ankle ROM    Lower Extremity Strength    hip flexion    hip extension    Hip IR    Hip ER    Hip abduction    Knee    Ankle            SPECIAL TESTS    Hamstring length    Hip FADIR    Hip SCOUR    Trendelenburg    Slump test    SLR test    BOZENA            LUMBAR PALPATION    HIP PALPATION    OTHER PALPATION        SI JOINT TESTING     S/L Compression test    Thigh thrust test    Prone sacral thrust test    Gaenslen test        BALANCE    GAIT MECHANICS    RUNNING MECHANICS    SQUATTING MECHANICS Leans to the L; focus on heel contact throughout   LUNGING MECHANICS    BED MOBILITY    SIT TO STAND    FLOOR TO STAND      Verbal consent give for internal evaluation and treatment. yes    Pelvic Floor MMT and Function INITIAL EVALUATION  2/22/23 pelvic eval F/U ASSESSMENT 4/17/23   Consent to eval and treat yes    Assessment position Hooklying, draped with sheet     EXTERNAL PFM EXAM No TTP B    PFM OBSERVATION     Skin integrity Atrophy, minor presence of labia minora    Contract Present, coordinates with breathing    Relax present    Bulge/pelvic drop present    Cough present    Perineal descent none    Vaginal wall laxity Pessary in place - did not assess    Light touch sensation intact    INTERNAL Assessment performed Internal vaginal     Levator Ani PERF       Power 3/5     Endurance 5     Repetitions 2 - well coordinated with breathing     Quick Flicks      Vaginal introitus loose    Preet-urethra No TTP    Levator palpation Min TTP  Dryness sensation  Mod tension    Puborectalis      Obturator Internus      Piriformis      TAILBONE          ABDOMEN     MMT strength      Diastasis Recti 1 finger width for 4\" preet-umbilicus    Pain Mild discomfort RA L>R  P! With pressure to that area following ab contraction    Breathing pattern WNL " WNL    Hip flexors No TTP No TTP   Skin integumentary/incision lines Unremarkable, well healed lap incision lines unremarkable         PFDI OUTCOME MEASURE 70.8 total: 12.5 POPDI, 12.5 CRAD, 45.8 DARNELL 41.6 total; 8.3 POPDI, 12.5 CRAD, 20.8 DARNELL   Adductors      Biofeedback                                                                                                                      TREATMENT LOG:  verbal consent for internal intervention: yes  Diagnosis  UI    Precautions       PT INITIAL EVALUATION:   2/14/23     PT PROGRESS NOTE:        Y/N Treatment Details: Time:   MODALITIES  39258   0 mins   Heat       Ice       THER ACT          66565   10 mins   OUTCOME MEASURES y PFDI    POC discussion      Review of symptoms  Tests/Measures y       Falls Screen, Medication Review, VS, pain assessment y      Education:  h    h    h  h      y  y Pelvic floor anatomy/ purpose function    Intro to POP precautions: intro to breath with movements/exercise; no pushing/straining  Weight loss goal discussion  Use of vaginal estrogen discussion - consider topical coconut oil vs replens vs reveree    Consider no coffee before walks, just the plain water  Be wary of lemon in water with bladder urgency and frequency    *Pt verbalized understanding of education and returned demonstration appropriately*    Bladder Education h Urgency suppression strategies - verbal, for during walking    Bowel Education      Toilet Posture edu/demo/practice      Postural edu y        y Squatting mechanics - bend from knees and hips; knee valgus; =WB, shift slightly to R; exhale with the lift  15x picking up bolster, 15x in isolation    Minimize kyphosis to minimize worsening POP and pressure on the bladder    HEP / HEP Review y  eval: TAC, TAC with add  2/22/23: bridge, hip abd, wall squat  2/27/23: KFO, shoulder row, SKTC str, piriformis str  3/27/23: hip ext<>march 4/5/23: tricep curl in supine  4/17/23:     THER EX         61319   45 mins  "  SUPINE THEREX:   h  y  y  y  y  y  y  h    h TAC with exhale 10x8 sec  TAC with bolster squeeze ADD 10x5 sec  TAC with hip abd, gtb 10x3 sec  TAC with bridge 10x3 sec, gtb at knees  TAC with bridge ball squeeze 10x5 sec  TAC with march 5x3 alt reps, ball squeeze bn hands  TAC with march and ball squeeze bn hands and thighs  TAC with KFO    tricep curl - no wt R, 3# L, 10-15 total    *all with coordinated breathing    SEATED THEREX:   y  h  h  h STS 2x6 with exhale - cued valgus L>R  TAC with bolster squeeze ADD 10x5 sec  TAC with hip abd, gtb 10x3 sec  TAC with KFO    STANDING THEREX:   h  y  h    h  h  h        h      h  h Wall squat 5x slowly with coordinated exhale  Shoulder row 20x5 sec hold, in tandem 10x B  No money in front of mirror 20x5 sec    Shoulder flexion otb 15x  Shoulder abd otb 15x  Bicep curld otb 15x        TRX mini squats 2x15 reps - cud knee valgus, full foot contact, \"sit back in chair\"    Standing march, otb at feet 20x  Tall plank at counter hip ext<>march 10x B    STRETCHING:   h  y  y Hamstring str in seated 30 sec B  SKTC 3x20 sec  Piriformis str 3x20 sec           NEURO RE-ED    01195   0 mins   Diaphragmatic breathing      Postural Re-Edu      Pelvic eval y edu on findings  Cued coordination of PFMC with exhale    Coordination with EMG Biofeedback  Pelvic floor muscle strengthening with [employer] biofeedback unit, external sensors placed at 9 and 3 o'clock of anal opening, grounding electrode placed on ischial tuberosity:   - SUPINE:   - SEATED:  - STANDING:      Biofeedback Objective Findings  SUPINE REST:  SUPINE WORK:  SEATED REST:  SEATED WORK:  STANDING REST:  STANDING WORK:    MANUAL                 47044   0 mins   Joint Mobilization       Deep Tissue mobilizations External      Deep Tissue Mobilization Internal      IASTM/MFR/TrP Release                PLAN:   - give urge suppression handout?   - progress TE - use TRX? Practice squats - cont             Goals Addressed        "          This Visit's Progress    • Mutually agreed upon pain goal   On track     Mutually agreed upon pain goal: n/a    PATIENT STATED: reduce UI      • Pelvic PT Goals   On track     Pt will be I with initial pain management strategies and PFM relaxation/coordination/strengthening exercises to improve pt's bladder function: 3 wks MET    Pt will be able to reduce urinary freq to 3 hours ave, 90% of the time, for improved bladder function and attention to work/errand task: 6 wks IMPROVING    Pt will be able to successfully demonstrate urge suppression strategies to delay urinary urgency for 5 min, 90% of the time, for improved bladder function and attention to errand task: 8 wks IMPROVING    Pt will be I with diaphragmatic breathing for reducing tension, stress, pelvic pain complaints: 4 wks MET    Pt will demonstrate the knack with cough/laugh/sneeze, 90% of the time, to reduce risk of urinary incontinence: 8 wks IMPROVING    Pt will report urinary leakage no greater than 2x/day, for improved bladder function: 8 wks IMPROVING    Pt will note 50% reduction in pad wetness, indicating improvement in UI: 12 wks IMPROVING    Pt will report urinary leakage no greater than 1x/wk, for improved bladder function: 12 wks    PFDI score will improve to below 55.8, indicating improvements in bladder function and QOL (70.8 on IE): 12 wks     Pt will be I with progression of PFM strengthening exercises to maintain/improve strength and bladder function: 12 wks

## 2023-04-17 NOTE — OP PT TREATMENT LOG
Pt is a 72 y.o. F with complaint of pelvic pressure and heaviness, using a pessary current; and complaints of daily UI.  Feels like sitting on a “ball” when not wearing a pessary. Using pessary right now and for last 1 year, with Dr. Mcdermott. Some bloody discharge prior to pessary, only noted one time since pessary in place.  Had recent US which was negative. Pt wants to avoid surgery for now.     Bladder: Comment:            Urination frequency Likely WNL, thinks about 8x/day   Urgency:  Can be urgent   Incontinence UUI first in the AM sometimes, maybe with running errands away from home    NAOMI with coughing/sneezing; lifting something heavy;   Usually a small amount of leak unless lifting something heavy    No UI stairs, walking, bed mobility   Pads Liner mult/day, will change when damp   Nocturia 5 hours without need to void, only sleeps 5 hours   Pain None   Emptying Complete  Pessary helped with starting the stream   Prolapse symptoms  Minimal with pessary, sometimes a discomfort, feels like she needs a break   Liquid consumption 3-4 quarts water  Decaf coffee in AM   UTI history    Irritants          Bowel: Comment:        Frequency Every other day   Urge WNL   Incontinence Avoids eggplant and food irritants   Emptying Complete, no pushing/straining   [place] stool smooth   Fiber Well rounded, fruits>veggies   Management Apricots   Pain None current    No bleeding current     OBGYN Comment:        Pregnancies 3   Births 3   Birth type Vaginal   Tears/ episiotomies Episiotomy x3   Surgery B ovaries removed due to cyst  2012  appendectomy   Menstruation Menopause    Last baby was 9#               Sexual Activity Comment:        Type None current     Pain None historically   Orgasm    Masturbation    Libido              Pain Comment:        L LBP on occasion 1/10 today   R shoulder/arm pain Has aggravated this with exercises in the past               Other Comment:        Physical activity  Walking 4-5  days/wk  [facility]  Free weights on arms   PLOF    Living environment withhusband   Other osteoporosis   Sleep Fall asleep in recliner  5 hours only  cpap             Systems Review:   Cord questions:  Pins and needles or tingling in both arms and both legs at the same time? denies  Problems with stumbling or falling? Denies     Cauda equina questions:  Problems with bowel and bladder control? Specifically retention? See above  Pins and needles or numbness in the saddle area? Denies     Review of systems:  General - (chills, night sweats, recent infection, fever, weight loss/gain, unexplained night pain, excessive fatigue): Denies  Do you have a history of cancer? Denies  Gastrointestinal system - (abdominal pain, bowel changes, nausea, vomiting, bloating): Denies  Cardiovascular system - (chest pain, palpitations, orthopnea, other): Denies  Respiratory system - (cough, SOB, sputum production, other): Denies  Musculoskeletal system: osteoporosis, vertebral fracture? Denies  Endocrine - (polyuria, polydipsia, heat or cold intolerance, other): Denies  Neurological - (numbness/tingling, falling/stumbling, HA, dizziness, diplopia, dysphagia/dysarthria, double vision, tinnitus, memory, drop attacks, other): Denies  Any long-term steroid use? Denies      Patient goals - reduce UI    OBJECTIVE FINDINGS:      ASSESSMENT   PELVIS/POSTURE            LUMBAR AROM    Flexion    Extension    rotation    Sidebending        HIP ROM    ER    IR    Flexion    Extension    Ankle ROM    Lower Extremity Strength    hip flexion    hip extension    Hip IR    Hip ER    Hip abduction    Knee    Ankle            SPECIAL TESTS    Hamstring length    Hip FADIR    Hip SCOUR    Trendelenburg    Slump test    SLR test    BOZENA            LUMBAR PALPATION    HIP PALPATION    OTHER PALPATION        SI JOINT TESTING     S/L Compression test    Thigh thrust test    Prone sacral thrust test    Gaenslen test        BALANCE    GAIT MECHANICS  "   RUNNING MECHANICS    SQUATTING MECHANICS Leans to the L; focus on heel contact throughout   LUNGING MECHANICS    BED MOBILITY    SIT TO STAND    FLOOR TO STAND      Verbal consent give for internal evaluation and treatment. yes    Pelvic Floor MMT and Function INITIAL EVALUATION  2/22/23 pelvic eval F/U ASSESSMENT 4/17/23   Consent to eval and treat yes    Assessment position Hooklying, draped with sheet     EXTERNAL PFM EXAM No TTP B    PFM OBSERVATION     Skin integrity Atrophy, minor presence of labia minora    Contract Present, coordinates with breathing    Relax present    Bulge/pelvic drop present    Cough present    Perineal descent none    Vaginal wall laxity Pessary in place - did not assess    Light touch sensation intact    INTERNAL Assessment performed Internal vaginal     Levator Ani PERF       Power 3/5     Endurance 5     Repetitions 2 - well coordinated with breathing     Quick Flicks      Vaginal introitus loose    Preet-urethra No TTP    Levator palpation Min TTP  Dryness sensation  Mod tension    Puborectalis      Obturator Internus      Piriformis      TAILBONE          ABDOMEN     MMT strength      Diastasis Recti 1 finger width for 4\" preet-umbilicus    Pain Mild discomfort RA L>R  P! With pressure to that area following ab contraction    Breathing pattern WNL WNL    Hip flexors No TTP No TTP   Skin integumentary/incision lines Unremarkable, well healed lap incision lines unremarkable         PFDI OUTCOME MEASURE 70.8 total: 12.5 POPDI, 12.5 CRAD, 45.8 DARNELL 41.6 total; 8.3 POPDI, 12.5 CRAD, 20.8 DARNELL   Adductors      Biofeedback                                                                                                                      TREATMENT LOG:  verbal consent for internal intervention: yes  Diagnosis  UI    Precautions       PT INITIAL EVALUATION:   2/14/23     PT PROGRESS NOTE:        Y/N Treatment Details: Time:   MODALITIES  03130   0 mins   Heat       Ice       THER ACT        "   51305   10 mins   OUTCOME MEASURES y PFDI    POC discussion      Review of symptoms  Tests/Measures y       Falls Screen, Medication Review, VS, pain assessment y      Education:  h    h    h  h      y  y Pelvic floor anatomy/ purpose function    Intro to POP precautions: intro to breath with movements/exercise; no pushing/straining  Weight loss goal discussion  Use of vaginal estrogen discussion - consider topical coconut oil vs replens vs reveree    Consider no coffee before walks, just the plain water  Be wary of lemon in water with bladder urgency and frequency    *Pt verbalized understanding of education and returned demonstration appropriately*    Bladder Education h Urgency suppression strategies - verbal, for during walking    Bowel Education      Toilet Posture edu/demo/practice      Postural edu y        y Squatting mechanics - bend from knees and hips; knee valgus; =WB, shift slightly to R; exhale with the lift  15x picking up bolster, 15x in isolation    Minimize kyphosis to minimize worsening POP and pressure on the bladder    HEP / HEP Review y  eval: TAC, TAC with add  2/22/23: bridge, hip abd, wall squat  2/27/23: KFO, shoulder row, SKTC str, piriformis str  3/27/23: hip ext<>march 4/5/23: tricep curl in supine  4/17/23:     THER EX         [id number]   45 mins   SUPINE THEREX:   h  y  y  y  y  y  y  h    h TAC with exhale 10x8 sec  TAC with bolster squeeze ADD 10x5 sec  TAC with hip abd, gtb 10x3 sec  TAC with bridge 10x3 sec, gtb at knees  TAC with bridge ball squeeze 10x5 sec  TAC with march 5x3 alt reps, ball squeeze bn hands  TAC with march and ball squeeze bn hands and thighs  TAC with KFO    tricep curl - no wt R, 3# L, 10-15 total    *all with coordinated breathing    SEATED THEREX:   y  h  h  h STS 2x6 with exhale - cued valgus L>R  TAC with bolster squeeze ADD 10x5 sec  TAC with hip abd, gtb 10x3 sec  TAC with KFO    STANDING THEREX:   h  y  h    h  h  h        h      h  h Wall squat 5x  "slowly with coordinated exhale  Shoulder row 20x5 sec hold, in tandem 10x B  No money in front of mirror 20x5 sec    Shoulder flexion otb 15x  Shoulder abd otb 15x  Bicep curld otb 15x        TRX mini squats 2x15 reps - cud knee valgus, full foot contact, \"sit back in chair\"    Standing march, otb at feet 20x  Tall plank at counter hip ext<>march 10x B    STRETCHING:   h  y  y Hamstring str in seated 30 sec B  SKTC 3x20 sec  Piriformis str 3x20 sec           NEURO RE-ED    17116   0 mins   Diaphragmatic breathing      Postural Re-Edu      Pelvic eval y edu on findings  Cued coordination of PFMC with exhale    Coordination with EMG Biofeedback  Pelvic floor muscle strengthening with [employer] biofeedback unit, external sensors placed at 9 and 3 o'clock of anal opening, grounding electrode placed on ischial tuberosity:   - SUPINE:   - SEATED:  - STANDING:      Biofeedback Objective Findings  SUPINE REST:  SUPINE WORK:  SEATED REST:  SEATED WORK:  STANDING REST:  STANDING WORK:    MANUAL                 88365   0 mins   Joint Mobilization       Deep Tissue mobilizations External      Deep Tissue Mobilization Internal      IASTM/MFR/TrP Release                PLAN:   - give urge suppression handout?   - progress TE - use TRX? Practice squats - cont        "

## 2023-04-28 ENCOUNTER — HOSPITAL ENCOUNTER (OUTPATIENT)
Dept: PHYSICAL THERAPY | Age: 73
Setting detail: THERAPIES SERIES
Discharge: HOME | End: 2023-04-28
Attending: UROLOGY
Payer: MEDICARE

## 2023-04-28 DIAGNOSIS — N32.81 OAB (OVERACTIVE BLADDER): Primary | ICD-10-CM

## 2023-04-28 PROCEDURE — 97530 THERAPEUTIC ACTIVITIES: CPT | Mod: GP

## 2023-04-28 PROCEDURE — 97110 THERAPEUTIC EXERCISES: CPT | Mod: GP

## 2023-04-28 NOTE — OP PT TREATMENT LOG
Pt is a 72 y.o. F with complaint of pelvic pressure and heaviness, using a pessary current; and complaints of daily UI.  Feels like sitting on a “ball” when not wearing a pessary. Using pessary right now and for last 1 year, with Dr. Mcdermott. Some bloody discharge prior to pessary, only noted one time since pessary in place.  Had recent US which was negative. Pt wants to avoid surgery for now.     Bladder: Comment:            Urination frequency Likely WNL, thinks about 8x/day   Urgency:  Can be urgent   Incontinence UUI first in the AM sometimes, maybe with running errands away from home    NAOMI with coughing/sneezing; lifting something heavy;   Usually a small amount of leak unless lifting something heavy    No UI stairs, walking, bed mobility   Pads Liner mult/day, will change when damp   Nocturia 5 hours without need to void, only sleeps 5 hours   Pain None   Emptying Complete  Pessary helped with starting the stream   Prolapse symptoms  Minimal with pessary, sometimes a discomfort, feels like she needs a break   Liquid consumption 3-4 quarts water  Decaf coffee in AM   UTI history    Irritants          Bowel: Comment:        Frequency Every other day   Urge WNL   Incontinence Avoids eggplant and food irritants   Emptying Complete, no pushing/straining   [place] stool smooth   Fiber Well rounded, fruits>veggies   Management Apricots   Pain None current    No bleeding current     OBGYN Comment:        Pregnancies 3   Births 3   Birth type Vaginal   Tears/ episiotomies Episiotomy x3   Surgery B ovaries removed due to cyst  2012  appendectomy   Menstruation Menopause    Last baby was 9#               Sexual Activity Comment:        Type None current     Pain None historically   Orgasm    Masturbation    Libido              Pain Comment:        L LBP on occasion 1/10 today   R shoulder/arm pain Has aggravated this with exercises in the past               Other Comment:        Physical activity  Walking 4-5  days/wk  [facility]  Free weights on arms   PLOF    Living environment withhusband   Other osteoporosis   Sleep Fall asleep in recliner  5 hours only  cpap             Systems Review:   Cord questions:  Pins and needles or tingling in both arms and both legs at the same time? denies  Problems with stumbling or falling? Denies     Cauda equina questions:  Problems with bowel and bladder control? Specifically retention? See above  Pins and needles or numbness in the saddle area? Denies     Review of systems:  General - (chills, night sweats, recent infection, fever, weight loss/gain, unexplained night pain, excessive fatigue): Denies  Do you have a history of cancer? Denies  Gastrointestinal system - (abdominal pain, bowel changes, nausea, vomiting, bloating): Denies  Cardiovascular system - (chest pain, palpitations, orthopnea, other): Denies  Respiratory system - (cough, SOB, sputum production, other): Denies  Musculoskeletal system: osteoporosis, vertebral fracture? Denies  Endocrine - (polyuria, polydipsia, heat or cold intolerance, other): Denies  Neurological - (numbness/tingling, falling/stumbling, HA, dizziness, diplopia, dysphagia/dysarthria, double vision, tinnitus, memory, drop attacks, other): Denies  Any long-term steroid use? Denies      Patient goals - reduce UI    OBJECTIVE FINDINGS:      ASSESSMENT   PELVIS/POSTURE            LUMBAR AROM    Flexion    Extension    rotation    Sidebending        HIP ROM    ER    IR    Flexion    Extension    Ankle ROM    Lower Extremity Strength    hip flexion    hip extension    Hip IR    Hip ER    Hip abduction    Knee    Ankle            SPECIAL TESTS    Hamstring length    Hip FADIR    Hip SCOUR    Trendelenburg    Slump test    SLR test    BOZENA            LUMBAR PALPATION    HIP PALPATION    OTHER PALPATION        SI JOINT TESTING     S/L Compression test    Thigh thrust test    Prone sacral thrust test    Gaenslen test        BALANCE    GAIT MECHANICS  "   RUNNING MECHANICS    SQUATTING MECHANICS Leans to the L; focus on heel contact throughout   LUNGING MECHANICS    BED MOBILITY    SIT TO STAND    FLOOR TO STAND      Verbal consent give for internal evaluation and treatment. yes    Pelvic Floor MMT and Function INITIAL EVALUATION  2/22/23 pelvic eval F/U ASSESSMENT 4/17/23   Consent to eval and treat yes    Assessment position Hooklying, draped with sheet     EXTERNAL PFM EXAM No TTP B    PFM OBSERVATION     Skin integrity Atrophy, minor presence of labia minora    Contract Present, coordinates with breathing    Relax present    Bulge/pelvic drop present    Cough present    Perineal descent none    Vaginal wall laxity Pessary in place - did not assess    Light touch sensation intact    INTERNAL Assessment performed Internal vaginal     Levator Ani PERF       Power 3/5     Endurance 5     Repetitions 2 - well coordinated with breathing     Quick Flicks      Vaginal introitus loose    Preet-urethra No TTP    Levator palpation Min TTP  Dryness sensation  Mod tension    Puborectalis      Obturator Internus      Piriformis      TAILBONE          ABDOMEN     MMT strength      Diastasis Recti 1 finger width for 4\" preet-umbilicus    Pain Mild discomfort RA L>R  P! With pressure to that area following ab contraction    Breathing pattern WNL WNL    Hip flexors No TTP No TTP   Skin integumentary/incision lines Unremarkable, well healed lap incision lines unremarkable         PFDI OUTCOME MEASURE 70.8 total: 12.5 POPDI, 12.5 CRAD, 45.8 DARNELL 41.6 total; 8.3 POPDI, 12.5 CRAD, 20.8 DARNELL   Adductors      Biofeedback                                                                                                                      TREATMENT LOG:  verbal consent for internal intervention: yes  Diagnosis  UI    Precautions       PT INITIAL EVALUATION:   2/14/23     PT PROGRESS NOTE:        Y/N Treatment Details: Time:   MODALITIES  71318   0 mins   Heat       Ice       THER ACT        "   75616   15 mins   OUTCOME MEASURES  PFDI    POC discussion      Review of symptoms  Tests/Measures y       Falls Screen, Medication Review, VS, pain assessment y      Education:  h    h    h  h      h  h Pelvic floor anatomy/ purpose function    Intro to POP precautions: intro to breath with movements/exercise; no pushing/straining  Weight loss goal discussion  Use of vaginal estrogen discussion - consider topical coconut oil vs replens vs reveree    Consider no coffee before walks, just the plain water  Be wary of lemon in water with bladder urgency and frequency    *Pt verbalized understanding of education and returned demonstration appropriately*    Bladder Education h Urgency suppression strategies - verbal, for during walking    Bowel Education      Toilet Posture edu/demo/practice      Postural edu y      y  y    h Squatting mechanics - bend from knees and hips; knee valgus; =WB, shift slightly to R; exhale with the lift  15x picking up bolster, 15x in isolation  TRX squats x10  TRX lunge 2x5 reps    Minimize kyphosis to minimize worsening POP and pressure on the bladder    HEP / HEP Review y  eval: TAC, TAC with add  2/22/23: bridge, hip abd, wall squat  2/27/23: KFO, shoulder row, SKTC str, piriformis str  3/27/23: hip ext<>march 4/5/23: tricep curl in supine      THER EX         [id number]   40 mins   SUPINE THEREX:   h  y  y  y  y  y  y  h    h TAC with exhale 10x8 sec  TAC with bolster squeeze ADD 10x5 sec  TAC with hip abd, gtb 10x3 sec  TAC with bridge 10x3 sec, gtb at knees  TAC with bridge ball squeeze 10x5 sec  TAC with march 5x3 alt reps, ball squeeze bn hands  TAC with march and ball squeeze bn hands and thighs  TAC with KFO    tricep curl - no wt R, 3# L, 10-15 total    *all with coordinated breathing    SEATED THEREX:   y  h  h  h STS 2x6 with exhale - cued valgus L>R  TAC with bolster squeeze ADD 10x5 sec  TAC with hip abd, gtb 10x3 sec  TAC with KFO    STANDING THEREX:    "h  y  h    h  h  h        h      h  h Wall squat 5x slowly with coordinated exhale  Shoulder row 20x5 sec hold, in tandem 10x B  No money in front of mirror 20x5 sec    Shoulder flexion otb 15x  Shoulder abd otb 15x  Bicep curld otb 15x        TRX mini squats 2x15 reps - cud knee valgus, full foot contact, \"sit back in chair\"    Standing march, otb at feet 20x  Tall plank at counter hip ext<>march 10x B    STRETCHING:   h  y  y Hamstring str in seated 30 sec B  SKTC 3x20 sec  Piriformis str 3x20 sec           NEURO RE-ED    60043   0 mins   Diaphragmatic breathing      Postural Re-Edu      Pelvic eval y edu on findings  Cued coordination of PFMC with exhale    Coordination with EMG Biofeedback  Pelvic floor muscle strengthening with [employer] biofeedback unit, external sensors placed at 9 and 3 o'clock of anal opening, grounding electrode placed on ischial tuberosity:   - SUPINE:   - SEATED:  - STANDING:      Biofeedback Objective Findings  SUPINE REST:  SUPINE WORK:  SEATED REST:  SEATED WORK:  STANDING REST:  STANDING WORK:    MANUAL                 13816   0 mins   Joint Mobilization       Deep Tissue mobilizations External      Deep Tissue Mobilization Internal      IASTM/MFR/TrP Release                PLAN:   - give urge suppression handout?   - progress TE - use TRX? Practice squats - cont        "

## 2023-05-01 ENCOUNTER — APPOINTMENT (OUTPATIENT)
Dept: LAB | Age: 73
End: 2023-05-01
Attending: INTERNAL MEDICINE
Payer: MEDICARE

## 2023-05-01 ENCOUNTER — TRANSCRIBE ORDERS (OUTPATIENT)
Dept: LAB | Age: 73
End: 2023-05-01

## 2023-05-01 DIAGNOSIS — E78.00 PURE HYPERCHOLESTEROLEMIA, UNSPECIFIED: Primary | ICD-10-CM

## 2023-05-01 DIAGNOSIS — E78.00 PURE HYPERCHOLESTEROLEMIA, UNSPECIFIED: ICD-10-CM

## 2023-05-01 DIAGNOSIS — N20.0 CALCULUS OF KIDNEY: ICD-10-CM

## 2023-05-01 LAB
ALT SERPL-CCNC: 21 IU/L (ref 11–54)
ANION GAP SERPL CALC-SCNC: 6 MEQ/L (ref 3–15)
AST SERPL-CCNC: 21 IU/L (ref 15–41)
BUN SERPL-MCNC: 12 MG/DL (ref 8–20)
CALCIUM SERPL-MCNC: 9.5 MG/DL (ref 8.9–10.3)
CHLORIDE SERPL-SCNC: 105 MEQ/L (ref 98–109)
CHOLEST SERPL-MCNC: 147 MG/DL
CO2 SERPL-SCNC: 30 MEQ/L (ref 22–32)
CREAT SERPL-MCNC: 0.7 MG/DL (ref 0.6–1.1)
GFR SERPL CREATININE-BSD FRML MDRD: >60 ML/MIN/1.73M*2
GLUCOSE SERPL-MCNC: 91 MG/DL (ref 70–99)
HDLC SERPL-MCNC: 60 MG/DL
HDLC SERPL: 2.5 {RATIO}
LDLC SERPL CALC-MCNC: 73 MG/DL
NONHDLC SERPL-MCNC: 87 MG/DL
POTASSIUM SERPL-SCNC: 4.1 MEQ/L (ref 3.6–5.1)
SODIUM SERPL-SCNC: 141 MEQ/L (ref 136–144)
TRIGL SERPL-MCNC: 72 MG/DL (ref 30–149)

## 2023-05-01 PROCEDURE — 80061 LIPID PANEL: CPT

## 2023-05-01 PROCEDURE — 84460 ALANINE AMINO (ALT) (SGPT): CPT

## 2023-05-01 PROCEDURE — 80048 BASIC METABOLIC PNL TOTAL CA: CPT

## 2023-05-01 PROCEDURE — 84450 TRANSFERASE (AST) (SGOT): CPT

## 2023-05-01 PROCEDURE — 36415 COLL VENOUS BLD VENIPUNCTURE: CPT

## 2023-05-01 NOTE — PROGRESS NOTES
Physical Therapy Visit    PT DAILY NOTE FOR OUTPATIENT THERAPY    Patient: Georgina Briseno MRN: [id number]  : 1950 72 y.o.  Referring Physician: Rocio Latham MD  Date of Visit: 2023    Certification Dates: 23 through 05/15/23    Diagnosis:   1. OAB (overactive bladder)               Precautions:   Existing Precautions/Restrictions: no known precautions/restrictions      TODAY'S VISIT    Time In Session:  Start Time: 1001  Stop Time: 1057  Time Calculation (min): 56 min   History/Vitals/Pain/Encounter Info - 23 1010        Injury History/Precautions/Daily Required Info    Document Type daily treatment     Existing Precautions/Restrictions no known precautions/restrictions     Patient/Family/Caregiver Comments/Observations [place] tightness in back area and a few other joints. Less stiff and tight today, did some bicycling in the pool which helped. Pleased that she is feeling less pelvic pressure overall. NAOMI with lifting heavy mostly, noticed this with cleaning the condo.     Patient reported fall since last visit No        Pain Assessment    Currently in pain No/Denies        Pain Intervention    Intervention  n/a     Post Intervention Comments n/a                Daily Treatment Assessment and Plan - 23 1010        Daily Treatment Assessment and Plan    Progress toward goals Progressing     Daily Outcome Summary Georgina describes a reduction in NAOMI and pelvic pressure complaints today. Reviewed some exercises per questions. And continuing to work on functional movement with good form to minimize leaking.                     OBJECTIVE DATA TAKEN TODAY:        Today's Treatment:    Pt is a 72 y.o. F with complaint of pelvic pressure and heaviness, using a pessary current; and complaints of daily UI.  Feels like sitting on a “ball” when not wearing a pessary. Using pessary right now and for last 1 year, with Dr. Mcdermott. Some bloody discharge prior to  pessary, only noted one time since pessary in place.  Had recent US which was negative. Pt wants to avoid surgery for now.     Bladder: Comment:            Urination frequency Likely WNL, thinks about 8x/day   Urgency:  Can be urgent   Incontinence UUI first in the AM sometimes, maybe with running errands away from home    NAOMI with coughing/sneezing; lifting something heavy;   Usually a small amount of leak unless lifting something heavy    No UI stairs, walking, bed mobility   Pads Liner mult/day, will change when damp   Nocturia 5 hours without need to void, only sleeps 5 hours   Pain None   Emptying Complete  Pessary helped with starting the stream   Prolapse symptoms  Minimal with pessary, sometimes a discomfort, feels like she needs a break   Liquid consumption 3-4 quarts water  Decaf coffee in AM   UTI history    Irritants          Bowel: Comment:        Frequency Every other day   Urge WNL   Incontinence Avoids eggplant and food irritants   Emptying Complete, no pushing/straining   [place] stool smooth   Fiber Well rounded, fruits>veggies   Management Apricots   Pain None current    No bleeding current     OBGYN Comment:        Pregnancies 3   Births 3   Birth type Vaginal   Tears/ episiotomies Episiotomy x3   Surgery B ovaries removed due to cyst  2012  appendectomy   Menstruation Menopause    Last baby was 9#               Sexual Activity Comment:        Type None current     Pain None historically   Orgasm    Masturbation    Libido              Pain Comment:        L LBP on occasion 1/10 today   R shoulder/arm pain Has aggravated this with exercises in the past               Other Comment:        Physical activity  Walking 4-5 days/wk  [facility]  Free weights on arms   PLOF    Living environment withhusband   Other osteoporosis   Sleep Fall asleep in recliner  5 hours only  cpap             Systems Review:   Cord questions:  Pins and needles or tingling in both arms and both legs at the same  time? denies  Problems with stumbling or falling? Denies     Cauda equina questions:  Problems with bowel and bladder control? Specifically retention? See above  Pins and needles or numbness in the saddle area? Denies     Review of systems:  General - (chills, night sweats, recent infection, fever, weight loss/gain, unexplained night pain, excessive fatigue): Denies  Do you have a history of cancer? Denies  Gastrointestinal system - (abdominal pain, bowel changes, nausea, vomiting, bloating): Denies  Cardiovascular system - (chest pain, palpitations, orthopnea, other): Denies  Respiratory system - (cough, SOB, sputum production, other): Denies  Musculoskeletal system: osteoporosis, vertebral fracture? Denies  Endocrine - (polyuria, polydipsia, heat or cold intolerance, other): Denies  Neurological - (numbness/tingling, falling/stumbling, HA, dizziness, diplopia, dysphagia/dysarthria, double vision, tinnitus, memory, drop attacks, other): Denies  Any long-term steroid use? Denies      Patient goals - reduce UI    OBJECTIVE FINDINGS:      ASSESSMENT   PELVIS/POSTURE            LUMBAR AROM    Flexion    Extension    rotation    Sidebending        HIP ROM    ER    IR    Flexion    Extension    Ankle ROM    Lower Extremity Strength    hip flexion    hip extension    Hip IR    Hip ER    Hip abduction    Knee    Ankle            SPECIAL TESTS    Hamstring length    Hip FADIR    Hip SCOUR    Trendelenburg    Slump test    SLR test    BOZENA            LUMBAR PALPATION    HIP PALPATION    OTHER PALPATION        SI JOINT TESTING     S/L Compression test    Thigh thrust test    Prone sacral thrust test    Gaenslen test        BALANCE    GAIT MECHANICS    RUNNING MECHANICS    SQUATTING MECHANICS Leans to the L; focus on heel contact throughout   LUNGING MECHANICS    BED MOBILITY    SIT TO STAND    FLOOR TO STAND      Verbal consent give for internal evaluation and treatment. yes    Pelvic Floor MMT and Function INITIAL  "EVALUATION  2/22/23 pelvic eval F/U ASSESSMENT 4/17/23   Consent to eval and treat yes    Assessment position Hooklying, draped with sheet     EXTERNAL PFM EXAM No TTP B    PFM OBSERVATION     Skin integrity Atrophy, minor presence of labia minora    Contract Present, coordinates with breathing    Relax present    Bulge/pelvic drop present    Cough present    Perineal descent none    Vaginal wall laxity Pessary in place - did not assess    Light touch sensation intact    INTERNAL Assessment performed Internal vaginal     Levator Ani PERF       Power 3/5     Endurance 5     Repetitions 2 - well coordinated with breathing     Quick Flicks      Vaginal introitus loose    Preet-urethra No TTP    Levator palpation Min TTP  Dryness sensation  Mod tension    Puborectalis      Obturator Internus      Piriformis      TAILBONE          ABDOMEN     MMT strength      Diastasis Recti 1 finger width for 4\" preet-umbilicus    Pain Mild discomfort RA L>R  P! With pressure to that area following ab contraction    Breathing pattern WNL WNL    Hip flexors No TTP No TTP   Skin integumentary/incision lines Unremarkable, well healed lap incision lines unremarkable         PFDI OUTCOME MEASURE 70.8 total: 12.5 POPDI, 12.5 CRAD, 45.8 DARNELL 41.6 total; 8.3 POPDI, 12.5 CRAD, 20.8 DARNELL   Adductors      Biofeedback                                                                                                                      TREATMENT LOG:  verbal consent for internal intervention: yes  Diagnosis  UI    Precautions       PT INITIAL EVALUATION:   2/14/23     PT PROGRESS NOTE:        Y/N Treatment Details: Time:   MODALITIES  53282   0 mins   Heat       Ice       THER ACT          23202   15 mins   OUTCOME MEASURES  PFDI    POC discussion      Review of symptoms  Tests/Measures y       Falls Screen, Medication Review, VS, pain assessment y      Education:  h    h    h  h      h  h Pelvic floor anatomy/ purpose function    Intro to POP " precautions: intro to breath with movements/exercise; no pushing/straining  Weight loss goal discussion  Use of vaginal estrogen discussion - consider topical coconut oil vs replens vs reveree    Consider no coffee before walks, just the plain water  Be wary of lemon in water with bladder urgency and frequency    *Pt verbalized understanding of education and returned demonstration appropriately*    Bladder Education h Urgency suppression strategies - verbal, for during walking    Bowel Education      Toilet Posture edu/demo/practice      Postural edu y      y  y    h Squatting mechanics - bend from knees and hips; knee valgus; =WB, shift slightly to R; exhale with the lift  15x picking up bolster, 15x in isolation  TRX squats x10  TRX lunge 2x5 reps    Minimize kyphosis to minimize worsening POP and pressure on the bladder    HEP / HEP Review y  eval: TAC, TAC with add  2/22/23: bridge, hip abd, wall squat  2/27/23: KFO, shoulder row, SKTC str, piriformis str  3/27/23: hip ext<>march 4/5/23: tricep curl in supine      THER EX         [id number]   40 mins   SUPINE THEREX:   h  y  y  y  y  y  y  h    h TAC with exhale 10x8 sec  TAC with bolster squeeze ADD 10x5 sec  TAC with hip abd, gtb 10x3 sec  TAC with bridge 10x3 sec, gtb at knees  TAC with bridge ball squeeze 10x5 sec  TAC with march 5x3 alt reps, ball squeeze bn hands  TAC with march and ball squeeze bn hands and thighs  TAC with KFO    tricep curl - no wt R, 3# L, 10-15 total    *all with coordinated breathing    SEATED THEREX:   y  h  h  h STS 2x6 with exhale - cued valgus L>R  TAC with bolster squeeze ADD 10x5 sec  TAC with hip abd, gtb 10x3 sec  TAC with KFO    STANDING THEREX:   h  y  h    h  h  h        h      h  h Wall squat 5x slowly with coordinated exhale  Shoulder row 20x5 sec hold, in tandem 10x B  No money in front of mirror 20x5 sec    Shoulder flexion otb 15x  Shoulder abd otb 15x  Bicep curld otb 15x        TRX mini squats 2x15 reps - cud knee  "valgus, full foot contact, \"sit back in chair\"    Standing march, otb at feet 20x  Tall plank at counter hip ext<>march 10x B    STRETCHING:   h  y  y Hamstring str in seated 30 sec B  SKTC 3x20 sec  Piriformis str 3x20 sec           NEURO RE-ED    31895   0 mins   Diaphragmatic breathing      Postural Re-Edu      Pelvic eval y edu on findings  Cued coordination of PFMC with exhale    Coordination with EMG Biofeedback  Pelvic floor muscle strengthening with [employer] biofeedback unit, external sensors placed at 9 and 3 o'clock of anal opening, grounding electrode placed on ischial tuberosity:   - SUPINE:   - SEATED:  - STANDING:      Biofeedback Objective Findings  SUPINE REST:  SUPINE WORK:  SEATED REST:  SEATED WORK:  STANDING REST:  STANDING WORK:    MANUAL                 [id number]   0 mins   Joint Mobilization       Deep Tissue mobilizations External      Deep Tissue Mobilization Internal      IASTM/MFR/TrP Release                PLAN:   - give urge suppression handout?   - progress TE - use TRX? Practice squats - cont                               "

## 2023-05-09 ENCOUNTER — HOSPITAL ENCOUNTER (OUTPATIENT)
Dept: PHYSICAL THERAPY | Age: 73
Setting detail: THERAPIES SERIES
Discharge: HOME | End: 2023-05-09
Attending: UROLOGY
Payer: MEDICARE

## 2023-05-09 DIAGNOSIS — N32.81 OAB (OVERACTIVE BLADDER): Primary | ICD-10-CM

## 2023-05-09 PROCEDURE — 97530 THERAPEUTIC ACTIVITIES: CPT | Mod: GP

## 2023-05-09 PROCEDURE — 97110 THERAPEUTIC EXERCISES: CPT | Mod: GP

## 2023-05-09 NOTE — PROGRESS NOTES
Physical Therapy Visit    PT DAILY NOTE FOR OUTPATIENT THERAPY    Patient: Georgina Briseno MRN: [id number]  : 1950 72 y.o.  Referring Physician: Rocio Latham MD  Date of Visit: 2023    Certification Dates: 23 through 05/15/23    Diagnosis:   1. OAB (overactive bladder)               Precautions:   Existing Precautions/Restrictions: no known precautions/restrictions      TODAY'S VISIT    Time In Session:  Start Time: 1006  Stop Time: 1101  Time Calculation (min): 55 min   History/Vitals/Pain/Encounter Info - 23 1023        Injury History/Precautions/Daily Required Info    Document Type daily treatment     Existing Precautions/Restrictions no known precautions/restrictions     Patient/Family/Caregiver Comments/Observations Pt says that she had a moment of UUI when awoke this AM, first time this has occurred in a while, a larger loss of urine rushing to the BR. Thinks bladder symptoms are improving.     Patient reported fall since last visit No        Pain Assessment    Currently in pain No/Denies        Pain Intervention    Intervention  n/a     Post Intervention Comments n/a                Daily Treatment Assessment and Plan - 23 1023        Daily Treatment Assessment and Plan    Progress toward goals Progressing     Daily Outcome Summary Pt is tolerating a progression of TE with consideration of breathing, core, and pelvic muscles - added more core/pelvic with balance. Pt is verbalizing a routine for mild back pain management with some light stretching. Printed urinary urgency suppression strategies to educate and help manage moments of UUI, and will assess response to this next session. Practicing functional tasks that may cause leaking, including lifting.                     OBJECTIVE DATA TAKEN TODAY:      Today's Treatment:    Pt is a 72 y.o. F with complaint of pelvic pressure and heaviness, using a pessary current; and complaints of daily UI.  Feels like sitting on a “ball”  when not wearing a pessary. Using pessary right now and for last 1 year, with Dr. Mcdermott. Some bloody discharge prior to pessary, only noted one time since pessary in place.  Had recent US which was negative. Pt wants to avoid surgery for now.     Bladder: Comment:            Urination frequency Likely WNL, thinks about 8x/day   Urgency:  Can be urgent   Incontinence UUI first in the AM sometimes, maybe with running errands away from home    NAOMI with coughing/sneezing; lifting something heavy;   Usually a small amount of leak unless lifting something heavy    No UI stairs, walking, bed mobility   Pads Liner mult/day, will change when damp   Nocturia 5 hours without need to void, only sleeps 5 hours   Pain None   Emptying Complete  Pessary helped with starting the stream   Prolapse symptoms  Minimal with pessary, sometimes a discomfort, feels like she needs a break   Liquid consumption 3-4 quarts water  Decaf coffee in AM   UTI history    Irritants          Bowel: Comment:        Frequency Every other day   Urge WNL   Incontinence Avoids eggplant and food irritants   Emptying Complete, no pushing/straining   [place] stool smooth   Fiber Well rounded, fruits>veggies   Management Apricots   Pain None current    No bleeding current     OBGYN Comment:        Pregnancies 3   Births 3   Birth type Vaginal   Tears/ episiotomies Episiotomy x3   Surgery B ovaries removed due to cyst  2012  appendectomy   Menstruation Menopause    Last baby was 9#               Sexual Activity Comment:        Type None current     Pain None historically   Orgasm    Masturbation    Libido              Pain Comment:        L LBP on occasion 1/10 today   R shoulder/arm pain Has aggravated this with exercises in the past               Other Comment:        Physical activity  Walking 4-5 days/wk  [facility]  Free weights on arms   PLOF    Living environment withhusband   Other osteoporosis   Sleep Fall asleep in recliner  5 hours  only  cpap             Systems Review:   Cord questions:  Pins and needles or tingling in both arms and both legs at the same time? denies  Problems with stumbling or falling? Denies     Cauda equina questions:  Problems with bowel and bladder control? Specifically retention? See above  Pins and needles or numbness in the saddle area? Denies     Review of systems:  General - (chills, night sweats, recent infection, fever, weight loss/gain, unexplained night pain, excessive fatigue): Denies  Do you have a history of cancer? Denies  Gastrointestinal system - (abdominal pain, bowel changes, nausea, vomiting, bloating): Denies  Cardiovascular system - (chest pain, palpitations, orthopnea, other): Denies  Respiratory system - (cough, SOB, sputum production, other): Denies  Musculoskeletal system: osteoporosis, vertebral fracture? Denies  Endocrine - (polyuria, polydipsia, heat or cold intolerance, other): Denies  Neurological - (numbness/tingling, falling/stumbling, HA, dizziness, diplopia, dysphagia/dysarthria, double vision, tinnitus, memory, drop attacks, other): Denies  Any long-term steroid use? Denies      Patient goals - reduce UI    OBJECTIVE FINDINGS:      ASSESSMENT   PELVIS/POSTURE            LUMBAR AROM    Flexion    Extension    rotation    Sidebending        HIP ROM    ER    IR    Flexion    Extension    Ankle ROM    Lower Extremity Strength    hip flexion    hip extension    Hip IR    Hip ER    Hip abduction    Knee    Ankle            SPECIAL TESTS    Hamstring length    Hip FADIR    Hip SCOUR    Trendelenburg    Slump test    SLR test    BOZENA            LUMBAR PALPATION    HIP PALPATION    OTHER PALPATION        SI JOINT TESTING     S/L Compression test    Thigh thrust test    Prone sacral thrust test    Gaenslen test        BALANCE    GAIT MECHANICS    RUNNING MECHANICS    SQUATTING MECHANICS Leans to the L; focus on heel contact throughout   LUNGING MECHANICS    BED MOBILITY    SIT TO STAND    FLOOR  "TO STAND      Verbal consent give for internal evaluation and treatment. yes    Pelvic Floor MMT and Function INITIAL EVALUATION  2/22/23 pelvic eval F/U ASSESSMENT 4/17/23   Consent to eval and treat yes    Assessment position Hooklying, draped with sheet     EXTERNAL PFM EXAM No TTP B    PFM OBSERVATION     Skin integrity Atrophy, minor presence of labia minora    Contract Present, coordinates with breathing    Relax present    Bulge/pelvic drop present    Cough present    Perineal descent none    Vaginal wall laxity Pessary in place - did not assess    Light touch sensation intact    INTERNAL Assessment performed Internal vaginal     Levator Ani PERF       Power 3/5     Endurance 5     Repetitions 2 - well coordinated with breathing     Quick Flicks      Vaginal introitus loose    Preet-urethra No TTP    Levator palpation Min TTP  Dryness sensation  Mod tension    Puborectalis      Obturator Internus      Piriformis      TAILBONE          ABDOMEN     MMT strength      Diastasis Recti 1 finger width for 4\" preet-umbilicus    Pain Mild discomfort RA L>R  P! With pressure to that area following ab contraction    Breathing pattern WNL WNL    Hip flexors No TTP No TTP   Skin integumentary/incision lines Unremarkable, well healed lap incision lines unremarkable         PFDI OUTCOME MEASURE 70.8 total: 12.5 POPDI, 12.5 CRAD, 45.8 DARNELL 41.6 total; 8.3 POPDI, 12.5 CRAD, 20.8 DARNELL   Adductors      Biofeedback                                                                                                                      TREATMENT LOG:  verbal consent for internal intervention: yes  Diagnosis  UI    Precautions       PT INITIAL EVALUATION:   2/14/23     PT PROGRESS NOTE:        Y/N Treatment Details: Time:   MODALITIES  94031   0 mins   Heat       Ice       THER ACT          24950   15 mins   OUTCOME MEASURES  PFDI    POC discussion      Review of symptoms  Tests/Measures y       Falls Screen, Medication Review, VS, pain " assessment y      Education:  h    h    h  h      h  h Pelvic floor anatomy/ purpose function    Intro to POP precautions: intro to breath with movements/exercise; no pushing/straining  Weight loss goal discussion  Use of vaginal estrogen discussion - consider topical coconut oil vs replens vs reveree    Consider no coffee before walks, just the plain water  Be wary of lemon in water with bladder urgency and frequency    *Pt verbalized understanding of education and returned demonstration appropriately*    Bladder Education y Urgency suppression strategies, handout provided    Bowel Education      Toilet Posture edu/demo/practice      Postural edu y      h  h    h Squatting mechanics - bend from knees and hips; knee valgus; =WB, shift slightly to R; exhale with the lift  15x picking up bolster, 15x in isolation  TRX squats x10  TRX lunge 2x5 reps    Minimize kyphosis to minimize worsening POP and pressure on the bladder    HEP / HEP Review y  eval: TAC, TAC with add  2/22/23: bridge, hip abd, wall squat  2/27/23: KFO, shoulder row, SKTC str, piriformis str  3/27/23: hip ext<>march 4/5/23: tricep curl in supine  5/9/23: urinary urge suppression strategies      THER EX         80167   40 mins   SUPINE THEREX:   h  h  y  y  h  h  h  h    y  y TAC with exhale 10x8 sec  TAC with bolster squeeze ADD 10x5 sec  TAC with hip abd, gtb 10x3 sec  TAC with bridge 10x3 sec, gtb at knees  TAC with bridge ball squeeze 10x5 sec  TAC with march 5x3 alt reps, ball squeeze bn hands  TAC with march and ball squeeze bn hands and thighs  TAC with KFO    tricep curl - no wt first, 6# MB in B hands 2nd, 10-15 each  Chest press 6# MB 20x    *all with coordinated breathing    SEATED THEREX:   y  h  h  h STS 2x6 with exhale - cued valgus L>R  TAC with bolster squeeze ADD 10x5 sec  TAC with hip abd, gtb 10x3 sec  TAC with KFO    STANDING THEREX:   h  y  h    h  h  h    y    y      h      h  h Wall squat 5x slowly with coordinated  "exhale  Shoulder row 20x5 sec hold, with march  No money in front of mirror 20x5 sec    Shoulder flexion otb 15x  Shoulder abd otb 15x  Bicep curld otb 15x    March 10x with 6# MB, [id number] hold with 2 finger UE touch, 10x without UE support  2x8 lunge onto dynadisc w/ and w/o UE touch      TRX mini squats 2x15 reps - cud knee valgus, full foot contact, \"sit back in chair\"    Standing march, otb at feet 20x  Tall plank at counter hip ext<>march 10x B    STRETCHING:   h  y  y Hamstring str in seated 30 sec B  SKTC 3x20 sec  Piriformis str 3x20 sec           NEURO RE-ED    79646   0 mins   Diaphragmatic breathing      Postural Re-Edu      Pelvic eval y edu on findings  Cued coordination of PFMC with exhale    Coordination with EMG Biofeedback  Pelvic floor muscle strengthening with [employer] biofeedback unit, external sensors placed at 9 and 3 o'clock of anal opening, grounding electrode placed on ischial tuberosity:   - SUPINE:   - SEATED:  - STANDING:      Biofeedback Objective Findings  SUPINE REST:  SUPINE WORK:  SEATED REST:  SEATED WORK:  STANDING REST:  STANDING WORK:    MANUAL                 67080   0 mins   Joint Mobilization       Deep Tissue mobilizations External      Deep Tissue Mobilization Internal      IASTM/MFR/TrP Release                PLAN:   - progress TE                                "

## 2023-05-09 NOTE — OP PT TREATMENT LOG
Pt is a 72 y.o. F with complaint of pelvic pressure and heaviness, using a pessary current; and complaints of daily UI.  Feels like sitting on a “ball” when not wearing a pessary. Using pessary right now and for last 1 year, with Dr. Mcdermott. Some bloody discharge prior to pessary, only noted one time since pessary in place.  Had recent US which was negative. Pt wants to avoid surgery for now.     Bladder: Comment:            Urination frequency Likely WNL, thinks about 8x/day   Urgency:  Can be urgent   Incontinence UUI first in the AM sometimes, maybe with running errands away from home    NAOMI with coughing/sneezing; lifting something heavy;   Usually a small amount of leak unless lifting something heavy    No UI stairs, walking, bed mobility   Pads Liner mult/day, will change when damp   Nocturia 5 hours without need to void, only sleeps 5 hours   Pain None   Emptying Complete  Pessary helped with starting the stream   Prolapse symptoms  Minimal with pessary, sometimes a discomfort, feels like she needs a break   Liquid consumption 3-4 quarts water  Decaf coffee in AM   UTI history    Irritants          Bowel: Comment:        Frequency Every other day   Urge WNL   Incontinence Avoids eggplant and food irritants   Emptying Complete, no pushing/straining   [place] stool smooth   Fiber Well rounded, fruits>veggies   Management Apricots   Pain None current    No bleeding current     OBGYN Comment:        Pregnancies 3   Births 3   Birth type Vaginal   Tears/ episiotomies Episiotomy x3   Surgery B ovaries removed due to cyst  2012  appendectomy   Menstruation Menopause    Last baby was 9#               Sexual Activity Comment:        Type None current     Pain None historically   Orgasm    Masturbation    Libido              Pain Comment:        L LBP on occasion 1/10 today   R shoulder/arm pain Has aggravated this with exercises in the past               Other Comment:        Physical activity  Walking 4-5  days/wk  [facility]  Free weights on arms   PLOF    Living environment withhusband   Other osteoporosis   Sleep Fall asleep in recliner  5 hours only  cpap             Systems Review:   Cord questions:  Pins and needles or tingling in both arms and both legs at the same time? denies  Problems with stumbling or falling? Denies     Cauda equina questions:  Problems with bowel and bladder control? Specifically retention? See above  Pins and needles or numbness in the saddle area? Denies     Review of systems:  General - (chills, night sweats, recent infection, fever, weight loss/gain, unexplained night pain, excessive fatigue): Denies  Do you have a history of cancer? Denies  Gastrointestinal system - (abdominal pain, bowel changes, nausea, vomiting, bloating): Denies  Cardiovascular system - (chest pain, palpitations, orthopnea, other): Denies  Respiratory system - (cough, SOB, sputum production, other): Denies  Musculoskeletal system: osteoporosis, vertebral fracture? Denies  Endocrine - (polyuria, polydipsia, heat or cold intolerance, other): Denies  Neurological - (numbness/tingling, falling/stumbling, HA, dizziness, diplopia, dysphagia/dysarthria, double vision, tinnitus, memory, drop attacks, other): Denies  Any long-term steroid use? Denies      Patient goals - reduce UI    OBJECTIVE FINDINGS:      ASSESSMENT   PELVIS/POSTURE            LUMBAR AROM    Flexion    Extension    rotation    Sidebending        HIP ROM    ER    IR    Flexion    Extension    Ankle ROM    Lower Extremity Strength    hip flexion    hip extension    Hip IR    Hip ER    Hip abduction    Knee    Ankle            SPECIAL TESTS    Hamstring length    Hip FADIR    Hip SCOUR    Trendelenburg    Slump test    SLR test    BOZENA            LUMBAR PALPATION    HIP PALPATION    OTHER PALPATION        SI JOINT TESTING     S/L Compression test    Thigh thrust test    Prone sacral thrust test    Gaenslen test        BALANCE    GAIT MECHANICS  "   RUNNING MECHANICS    SQUATTING MECHANICS Leans to the L; focus on heel contact throughout   LUNGING MECHANICS    BED MOBILITY    SIT TO STAND    FLOOR TO STAND      Verbal consent give for internal evaluation and treatment. yes    Pelvic Floor MMT and Function INITIAL EVALUATION  2/22/23 pelvic eval F/U ASSESSMENT 4/17/23   Consent to eval and treat yes    Assessment position Hooklying, draped with sheet     EXTERNAL PFM EXAM No TTP B    PFM OBSERVATION     Skin integrity Atrophy, minor presence of labia minora    Contract Present, coordinates with breathing    Relax present    Bulge/pelvic drop present    Cough present    Perineal descent none    Vaginal wall laxity Pessary in place - did not assess    Light touch sensation intact    INTERNAL Assessment performed Internal vaginal     Levator Ani PERF       Power 3/5     Endurance 5     Repetitions 2 - well coordinated with breathing     Quick Flicks      Vaginal introitus loose    Preet-urethra No TTP    Levator palpation Min TTP  Dryness sensation  Mod tension    Puborectalis      Obturator Internus      Piriformis      TAILBONE          ABDOMEN     MMT strength      Diastasis Recti 1 finger width for 4\" preet-umbilicus    Pain Mild discomfort RA L>R  P! With pressure to that area following ab contraction    Breathing pattern WNL WNL    Hip flexors No TTP No TTP   Skin integumentary/incision lines Unremarkable, well healed lap incision lines unremarkable         PFDI OUTCOME MEASURE 70.8 total: 12.5 POPDI, 12.5 CRAD, 45.8 DARNELL 41.6 total; 8.3 POPDI, 12.5 CRAD, 20.8 DARNELL   Adductors      Biofeedback                                                                                                                      TREATMENT LOG:  verbal consent for internal intervention: yes  Diagnosis  UI    Precautions       PT INITIAL EVALUATION:   2/14/23     PT PROGRESS NOTE:        Y/N Treatment Details: Time:   MODALITIES  33745   0 mins   Heat       Ice       THER ACT        "   17454   15 mins   OUTCOME MEASURES  PFDI    POC discussion      Review of symptoms  Tests/Measures y       Falls Screen, Medication Review, VS, pain assessment y      Education:  h    h    h  h      h  h Pelvic floor anatomy/ purpose function    Intro to POP precautions: intro to breath with movements/exercise; no pushing/straining  Weight loss goal discussion  Use of vaginal estrogen discussion - consider topical coconut oil vs replens vs reveree    Consider no coffee before walks, just the plain water  Be wary of lemon in water with bladder urgency and frequency    *Pt verbalized understanding of education and returned demonstration appropriately*    Bladder Education y Urgency suppression strategies, handout provided    Bowel Education      Toilet Posture edu/demo/practice      Postural edu y      h  h    h Squatting mechanics - bend from knees and hips; knee valgus; =WB, shift slightly to R; exhale with the lift  15x picking up bolster, 15x in isolation  TRX squats x10  TRX lunge 2x5 reps    Minimize kyphosis to minimize worsening POP and pressure on the bladder    HEP / HEP Review y  eval: TAC, TAC with add  2/22/23: bridge, hip abd, wall squat  2/27/23: KFO, shoulder row, SKTC str, piriformis str  3/27/23: hip ext<>march 4/5/23: tricep curl in supine  5/9/23: urinary urge suppression strategies      THER EX         63796   40 mins   SUPINE THEREX:   h  h  y  y  h  h  h  h    y  y TAC with exhale 10x8 sec  TAC with bolster squeeze ADD 10x5 sec  TAC with hip abd, gtb 10x3 sec  TAC with bridge 10x3 sec, gtb at knees  TAC with bridge ball squeeze 10x5 sec  TAC with march 5x3 alt reps, ball squeeze bn hands  TAC with march and ball squeeze bn hands and thighs  TAC with KFO    tricep curl - no wt first, 6# MB in B hands 2nd, 10-15 each  Chest press 6# MB 20x    *all with coordinated breathing    SEATED THEREX:   y  h  h  h STS 2x6 with exhale - cued valgus L>R  TAC with bolster squeeze ADD 10x5 sec  TAC with hip  "abd, gtb 10x3 sec  TAC with KFO    STANDING THEREX:   h  y  h    h  h  h    y    y      h      h  h Wall squat 5x slowly with coordinated exhale  Shoulder row 20x5 sec hold, with march  No money in front of mirror 20x5 sec    Shoulder flexion otb 15x  Shoulder abd otb 15x  Bicep curld otb 15x    March 10x with 6# MB, [id number] hold with 2 finger UE touch, 10x without UE support  2x8 lunge onto dynadisc w/ and w/o UE touch      TRX mini squats 2x15 reps - cud knee valgus, full foot contact, \"sit back in chair\"    Standing march, otb at feet 20x  Tall plank at counter hip ext<>march 10x B    STRETCHING:   h  y  y Hamstring str in seated 30 sec B  SKTC 3x20 sec  Piriformis str 3x20 sec           NEURO RE-ED    95739   0 mins   Diaphragmatic breathing      Postural Re-Edu      Pelvic eval y edu on findings  Cued coordination of PFMC with exhale    Coordination with EMG Biofeedback  Pelvic floor muscle strengthening with [employer] [place] [employer] biofeedback unit, external sensors placed at 9 and 3 o'clock of anal opening, grounding electrode placed on ischial tuberosity:   - SUPINE:   - SEATED:  - STANDING:      Biofeedback Objective Findings  SUPINE REST:  SUPINE WORK:  SEATED REST:  SEATED WORK:  STANDING REST:  STANDING WORK:    MANUAL                 48615   0 mins   Joint Mobilization       Deep Tissue mobilizations External      Deep Tissue Mobilization Internal      IASTM/MFR/TrP Release                PLAN:   - progress TE         "

## 2023-05-15 ENCOUNTER — HOSPITAL ENCOUNTER (OUTPATIENT)
Dept: PHYSICAL THERAPY | Age: 73
Setting detail: THERAPIES SERIES
Discharge: HOME | End: 2023-05-15
Attending: UROLOGY
Payer: MEDICARE

## 2023-05-15 DIAGNOSIS — N32.81 OAB (OVERACTIVE BLADDER): Primary | ICD-10-CM

## 2023-05-15 PROCEDURE — 97530 THERAPEUTIC ACTIVITIES: CPT | Mod: GP

## 2023-05-15 PROCEDURE — 97110 THERAPEUTIC EXERCISES: CPT | Mod: GP

## 2023-05-15 NOTE — ADDENDUM NOTE
Encounter addended by: Deann Waters, PT on: 5/15/2023 2:53 PM   Actions taken: Flowsheet accepted, Charge Capture section accepted

## 2023-05-15 NOTE — PROGRESS NOTES
Physical Therapy Progress Note    Referring Provider: By co-signing this Plan of Care (POC) either electronically or physically you agree to the following:    I have reviewed the the Plan of Care established by the therapist within this document and certify that the services are skilled and medically necessary. I have reviewed the plan and recommend that these services continue to meet the goals stated in this document.       EXTERNAL PROVIDER FAXING BACK:    PHYSICIAN SIGNATURE: __________________________________     DATE: ___________________    TIME: _________    IMPORTANT:  If returning this Plan of Care by fax, please fax back ONLY the signature page.   _____________________________________________________________________    [facility] Fax: [phone]      PT RE-EVALUATION FOR OUTPATIENT THERAPY    Patient: Em Briseno   MRN: [id number]  : 1950 72 y.o.  Referring Physician: Rocio Latham MD  Date of Visit: 5/15/2023      New Certification Dates: 05/15/23 through 23    Recommended Frequency & Duration:    for up to 8 weeks   2 check-ins, spaced one month apart. 2 visits totaly over next 8 weeks.      Diagnosis:   1. OAB (overactive bladder)        Chief Complaints:  Chief Complaint   Patient presents with   • Other     Bowel + Bladder   • Pain       Precautions:   Existing Precautions/Restrictions: no known precautions/restrictions      TODAY'S VISIT:    Time In Session:  Start Time: 1006 (Pt delayed checkin)  Stop Time: 1100  Time Calculation (min): 54 min   General Information - 05/15/23 1005        Session Details    Document Type re-evaluation     Mode of Treatment individual therapy        General Information    Referring Physician Dr. Rocio Latham     History of present illness/functional impairment --     Patient/Family/Caregiver Comments/Observations Pt believes leakage has improved by 25%-50% and remarks that general discomfort or feelings of pelvic pressure has  "reduced greatly. Urge urinary incotnience is happening 1x/week or less. Pt reports 2 instances of UUI with long walks since beginning therapy. Pt still experiencing stress urinary incontinence daily. Noticed mostely with getting up from a low, soft surface such as a lawn chair or the couch. Continues to mame with sneezing. Pt reports using 5-6 \"liners\" per day but notes changing them often due to clenliness standards rather than wetness. Pt can go 2-3 hours with a dry liner. Pt is drinking 3-4 qts of water per day. Pt reports having to push and strain for a bowel movement on saturday which lead to bleeding - unsure if vaginal or anal in origin. Pt contacted Dr. Mcgill's office and was told as long as feeling better and bleeding stopped, does not need to come in. Pt reports feeling better and having 2 normal BMs with no straining/pushing/bleeding since saturday. Pt reports no pushing or straining with 95% of BMs.     Existing Precautions/Restrictions no known precautions/restrictions                Daily Falls Screen - 05/15/23 1005        Daily Falls Assessment    Patient reported fall since last visit No                Pain/Vitals - 05/15/23 1005        Pain Assessment    Currently in pain No/Denies        Pain Intervention    Intervention  N/A     Post Intervention Comments N/A                PT - 05/15/23 1005        Physical Therapy    Physical Therapy Specialty Pelvic Floor Program: Age 14+        PT Plan    PT Duration 8 weeks     PT Custom Frequency and Duration 2 check-ins, spaced one month apart. 2 visits totaly over next 8 weeks.     PT Cert From 05/15/23     PT Cert To 07/14/23     Date PT POC was sent to provider 05/15/23     Signed PT Plan of Care received?  No                Assessment and Plan - 05/15/23 1215        Assessment    Plan of Care reviewed and patient/family in agreement Yes     Clinical Assessment Pt is a 71 y/o with complaints of mixed UI. Pt is dedicated to HEP and continues to " progress toward goals. Offered education on toileting posture and proper breathing for emptying per pt reports of pushing and straining over the weekend. Reviewed lifting mechanics with therex where pt required cueing for proper biomechanics and breathing. Plan to followup with pt in one month to assess progress. Pt will continue to benefit from skilled PFPT to improve independence and reduce sxs of UI.                   OBJECTIVE MEASUREMENTS/DATA:     Outcome Measures        2/14/2023    08:13 3/13/2023    08:10 4/17/2023    09:18 5/15/2023    12:15   PT SUBJECTIVE Outcome Measures   Other PFDI 70.8 total PFDI 119.8 total PFDI 41.6 total PFDI - 38.5 total       Today's Treatment::    Pt is a 72 y.o. F with complaint of pelvic pressure and heaviness, using a pessary current; and complaints of daily UI.  Feels like sitting on a “ball” when not wearing a pessary. Using pessary right now and for last 1 year, with Dr. Mcdermott. Some bloody discharge prior to pessary, only noted one time since pessary in place.  Had recent US which was negative. Pt wants to avoid surgery for now.     Bladder: Comment:            Urination frequency Likely WNL, thinks about 8x/day   Urgency:  Can be urgent   Incontinence UUI first in the AM sometimes, maybe with running errands away from home    NAOMI with coughing/sneezing; lifting something heavy;   Usually a small amount of leak unless lifting something heavy    No UI stairs, walking, bed mobility   Pads Liner mult/day, will change when damp   Nocturia 5 hours without need to void, only sleeps 5 hours   Pain None   Emptying Complete  Pessary helped with starting the stream   Prolapse symptoms  Minimal with pessary, sometimes a discomfort, feels like she needs a break   Liquid consumption 3-4 quarts water  Decaf coffee in AM   UTI history    Irritants          Bowel: Comment:        Frequency Every other day   Urge WNL   Incontinence Avoids eggplant and food irritants   Emptying Complete,  no pushing/straining   Busy stool smooth   Fiber Well rounded, fruits>veggies   Management Apricots   Pain None current    No bleeding current     OBGYN Comment:        Pregnancies 3   Births 3   Birth type Vaginal   Tears/ episiotomies Episiotomy x3   Surgery B ovaries removed due to cyst  2012  appendectomy   Menstruation Menopause    Last baby was 9#               Sexual Activity Comment:        Type None current     Pain None historically   Orgasm    Masturbation    Libido              Pain Comment:        L LBP on occasion 1/10 today   R shoulder/arm pain Has aggravated this with exercises in the past               Other Comment:        Physical activity  Walking 4-5 days/wk  [facility] occasion  Free weights on arms   PLOF    Living environment withhusband   Other osteoporosis   Sleep Fall asleep in recliner  5 hours only  cpap             Systems Review:   Cord questions:  Pins and needles or tingling in both arms and both legs at the same time? denies  Problems with stumbling or falling? Denies     Cauda equina questions:  Problems with bowel and bladder control? Specifically retention? See above  Pins and needles or numbness in the saddle area? Denies     Review of systems:  General - (chills, night sweats, recent infection, fever, weight loss/gain, unexplained night pain, excessive fatigue): Denies  Do you have a history of cancer? Denies  Gastrointestinal system - (abdominal pain, bowel changes, nausea, vomiting, bloating): Denies  Cardiovascular system - (chest pain, palpitations, orthopnea, other): Denies  Respiratory system - (cough, SOB, sputum production, other): Denies  Musculoskeletal system: osteoporosis, vertebral fracture? Denies  Endocrine - (polyuria, polydipsia, heat or cold intolerance, other): Denies  Neurological - (numbness/tingling, falling/stumbling, HA, dizziness, diplopia, dysphagia/dysarthria, double vision, tinnitus, memory, drop attacks, other): Denies  Any long-term steroid  "use? Denies      Patient goals - reduce UI    OBJECTIVE FINDINGS:      ASSESSMENT   PELVIS/POSTURE            LUMBAR AROM    Flexion    Extension    rotation    Sidebending        HIP ROM    ER    IR    Flexion    Extension    Ankle ROM    Lower Extremity Strength    hip flexion    hip extension    Hip IR    Hip ER    Hip abduction    Knee    Ankle            SPECIAL TESTS    Hamstring length    Hip FADIR    Hip SCOUR    Trendelenburg    Slump test    SLR test    BOZENA            LUMBAR PALPATION    HIP PALPATION    OTHER PALPATION        SI JOINT TESTING     S/L Compression test    Thigh thrust test    Prone sacral thrust test    Gaenslen test        BALANCE    GAIT MECHANICS    RUNNING MECHANICS    SQUATTING MECHANICS Leans to the L; focus on heel contact throughout   LUNGING MECHANICS    BED MOBILITY    SIT TO STAND    FLOOR TO STAND      Verbal consent give for internal evaluation and treatment. yes    Pelvic Floor MMT and Function INITIAL EVALUATION  2/22/23 pelvic eval F/U ASSESSMENT 4/17/23 RE-EVAL 5/15/23   Consent to eval and treat yes  yes   Assessment position Hooklying, draped with sheet      EXTERNAL PFM EXAM No TTP B     PFM OBSERVATION      Skin integrity Atrophy, minor presence of labia minora     Contract Present, coordinates with breathing     Relax present     Bulge/pelvic drop present     Cough present     Perineal descent none     Vaginal wall laxity Pessary in place - did not assess     Light touch sensation intact     INTERNAL Assessment performed Internal vaginal      Levator Ani PERF        Power 3/5      Endurance 5      Repetitions 2 - well coordinated with breathing      Quick Flicks       Vaginal introitus loose     Preet-urethra No TTP     Levator palpation Min TTP  Dryness sensation  Mod tension     Puborectalis       Obturator Internus       Piriformis       TAILBONE            ABDOMEN      MMT strength       Diastasis Recti 1 finger width for 4\" preet-umbilicus  -   Pain Mild " discomfort RA L>R  P! With pressure to that area following ab contraction  Denies diomfort   Breathing pattern WNL WNL  WNL   Hip flexors No TTP No TTP R TTP, L no TTP   Skin integumentary/incision lines Unremarkable, well healed lap incision lines unremarkable unremarkable          PFDI OUTCOME MEASURE 70.8 total: 12.5 POPDI, 12.5 CRAD, 45.8 DARNELL 41.6 total; 8.3 POPDI, 12.5 CRAD, 20.8 DARNELL 38.5 total; 8.3 POPDI; 9.4 CRAD; 20.8 DARNELL   Adductors       Biofeedback          TREATMENT LOG:  verbal consent for internal intervention: yes  Diagnosis  UI    Precautions       PT INITIAL EVALUATION:   2/14/23     PT PROGRESS NOTE:        Y/N Treatment Details: Time:   MODALITIES  61101   0 mins   Heat       Ice       THER ACT          83257   30 mins   OUTCOME MEASURES Y PFDI    POC discussion Y One month check in appt to assess independence/progress    Review of symptoms  Tests/Measures y       Falls Screen, Medication Review, VS, pain assessment y      Education:  h    Y            h  h      h  h Pelvic floor anatomy/ purpose function    Intro to POP precautions: intro to breath with movements/exercise; no pushing/straining            -Reviewed today as pertains to pt report of pushing and straining over the weekend - reviewed why pushing and straining is not recommended and can worsen/complicate prolapse.     Weight loss goal discussion  Use of vaginal estrogen discussion - consider topical coconut oil vs replens vs reveree    Consider no coffee before walks, just the plain water  Be wary of lemon in water with bladder urgency and frequency    *Pt verbalized understanding of education and returned demonstration appropriately*    Bladder Education h Urgency suppression strategies, handout provided    Bowel Education      Toilet Posture edu/demo/practice Y Reviewed posture and handout was provided.     Discussed proper breathing for emptying         Postural edu h      h  h    h Squatting mechanics - bend from knees and hips;  "knee valgus; =WB, shift slightly to R; exhale with the lift  15x picking up bolster, 15x in isolation  TRX squats x10  TRX lunge 2x5 reps    Minimize kyphosis to minimize worsening POP and pressure on the bladder    HEP / HEP Review h  eval: TAC, TAC with add  2/22/23: bridge, hip abd, wall squat  2/27/23: KFO, shoulder row, SKTC str, piriformis str  3/27/23: hip ext<>march 4/5/23: tricep curl in supine  5/9/23: urinary urge suppression strategies      THER EX         96205   25 mins   SUPINE THEREX:   h  h    h  h  h  h    h  h TAC with exhale 10x8 sec  TAC with bolster squeeze ADD 10x5 sec  TAC with hip abd, gtb 10x3 sec  TAC with bridge 10x3 sec, gtb at knees  TAC with bridge ball squeeze 10x5 sec  TAC with march 5x3 alt reps, ball squeeze bn hands  TAC with march and ball squeeze bn hands and thighs  TAC with KFO    tricep curl - no wt first, 6# MB in B hands 2nd, 10-15 each  Chest press 6# MB 20x    *all with coordinated breathing    SEATED THEREX:   Y  h  h  h STS 2x6 with exhale - cued valgus L>R  TAC with bolster squeeze ADD 10x5 sec  TAC with hip abd, gtb 10x3 sec  TAC with KFO    STANDING THEREX:   h    h    h  h  h              h      Y        Y  Y    h  h Wall squat 5x slowly with coordinated exhale  Shoulder row 20x5 sec hold, with march  No money in front of mirror 20x5 sec    Shoulder flexion otb 15x  Shoulder abd otb 15x  Bicep curld otb 15x    March 10x with 6# MB, [id number] hold with 2 finger UE touch, 10x without UE support  2x8 lunge onto dynadisc w/ and w/o UE touch      TRX mini squats 2x15 reps - cud knee valgus, full foot contact, \"sit back in chair\"    Squats w/ bolster lift 2x10        Cued lifting mechanics         Cued breathing pattern    Static Lunge B 2x5  Side Lunges B x10    Standing march, otb at feet 20x  Tall plank at counter hip ext<>march 10x B    STRETCHING:         Y Hamstring str in seated 30 sec B  SKTC 3x20 sec  Piriformis str 3x20 sec  Supine HF Stretch (R only) x60s        "    NEURO RE-ED    50077   0 mins   Diaphragmatic breathing      Postural Re-Edu      Pelvic eval h edu on findings  Cued coordination of PFMC with exhale    Coordination with EMG Biofeedback  Pelvic floor muscle strengthening with [employer] biofeedback unit, external sensors placed at 9 and 3 o'clock of anal opening, grounding electrode placed on ischial tuberosity:   - SUPINE:   - SEATED:  - STANDING:      Biofeedback Objective Findings  SUPINE REST:  SUPINE WORK:  SEATED REST:  SEATED WORK:  STANDING REST:  STANDING WORK:    MANUAL                 39906   0 mins   Joint Mobilization       Deep Tissue mobilizations External      Deep Tissue Mobilization Internal      IASTM/MFR/TrP Release                PLAN:   - progress TE   - F/U in one month            Goals:   Goals     • Mutually agreed upon pain goal      Mutually agreed upon pain goal: n/a    PATIENT STATED: reduce UI      • Pelvic PT Goals      Pt will be I with initial pain management strategies and PFM relaxation/coordination/strengthening exercises to improve pt's bladder function: 3 wks MET    Pt will be able to reduce urinary freq to 3 hours ave, 90% of the time, for improved bladder function and attention to work/errand task: 6 wks IMPROVING    Pt will be able to successfully demonstrate urge suppression strategies to delay urinary urgency for 5 min, 90% of the time, for improved bladder function and attention to errand task: 8 wks IMPROVING    Pt will be I with diaphragmatic breathing for reducing tension, stress, pelvic pain complaints: 4 wks MET    Pt will demonstrate the knack with cough/laugh/sneeze, 90% of the time, to reduce risk of urinary incontinence: 8 wks IMPROVING    Pt will report urinary leakage no greater than 2x/day, for improved bladder function: 8 wks MET FOR UUI, IMPROVING FOR NAOMI    Pt will note 50% reduction in pad wetness, indicating improvement in UI: 12 wks IMPROVING    Pt will report urinary leakage no greater than 1x/wk,  for improved bladder function: 12 wks IMPROVING     PFDI score will improve to below 55.8, indicating improvements in bladder function and QOL (70.8 on IE): 12 wks  MET (38.5 ON 5/15)    Pt will be I with progression of PFM strengthening exercises to maintain/improve strength and bladder function: 12 wks MET              This 72 y.o. year old female presents to PT with above stated diagnosis. Physical Therapy evaluation reveals   resulting in   limitations. Em Briseno will benefit from skilled PT services to address limitation, work towards rehab and patient goals and maximize PLOF of chosen ADLs.     Planned Services: The patient's treatment will include  , .

## 2023-05-15 NOTE — OP PT TREATMENT LOG
Pt is a 72 y.o. F with complaint of pelvic pressure and heaviness, using a pessary current; and complaints of daily UI.  Feels like sitting on a “ball” when not wearing a pessary. Using pessary right now and for last 1 year, with Dr. Mcdermott. Some bloody discharge prior to pessary, only noted one time since pessary in place.  Had recent US which was negative. Pt wants to avoid surgery for now.     Bladder: Comment:            Urination frequency Likely WNL, thinks about 8x/day   Urgency:  Can be urgent   Incontinence UUI first in the AM sometimes, maybe with running errands away from home    NAOMI with coughing/sneezing; lifting something heavy;   Usually a small amount of leak unless lifting something heavy    No UI stairs, walking, bed mobility   Pads Liner mult/day, will change when damp   Nocturia 5 hours without need to void, only sleeps 5 hours   Pain None   Emptying Complete  Pessary helped with starting the stream   Prolapse symptoms  Minimal with pessary, sometimes a discomfort, feels like she needs a break   Liquid consumption 3-4 quarts water  Decaf coffee in AM   UTI history    Irritants          Bowel: Comment:        Frequency Every other day   Urge WNL   Incontinence Avoids eggplant and food irritants   Emptying Complete, no pushing/straining   [place] stool smooth   Fiber Well rounded, fruits>veggies   Management Apricots   Pain None current    No bleeding current     OBGYN Comment:        Pregnancies 3   Births 3   Birth type Vaginal   Tears/ episiotomies Episiotomy x3   Surgery B ovaries removed due to cyst  2012  appendectomy   Menstruation Menopause    Last baby was 9#               Sexual Activity Comment:        Type None current     Pain None historically   Orgasm    Masturbation    Libido              Pain Comment:        L LBP on occasion 1/10 today   R shoulder/arm pain Has aggravated this with exercises in the past               Other Comment:        Physical activity  Walking 4-5  days/wk  [facility]  Free weights on arms   PLOF    Living environment withhusband   Other osteoporosis   Sleep Fall asleep in recliner  5 hours only  cpap             Systems Review:   Cord questions:  Pins and needles or tingling in both arms and both legs at the same time? denies  Problems with stumbling or falling? Denies     Cauda equina questions:  Problems with bowel and bladder control? Specifically retention? See above  Pins and needles or numbness in the saddle area? Denies     Review of systems:  General - (chills, night sweats, recent infection, fever, weight loss/gain, unexplained night pain, excessive fatigue): Denies  Do you have a history of cancer? Denies  Gastrointestinal system - (abdominal pain, bowel changes, nausea, vomiting, bloating): Denies  Cardiovascular system - (chest pain, palpitations, orthopnea, other): Denies  Respiratory system - (cough, SOB, sputum production, other): Denies  Musculoskeletal system: osteoporosis, vertebral fracture? Denies  Endocrine - (polyuria, polydipsia, heat or cold intolerance, other): Denies  Neurological - (numbness/tingling, falling/stumbling, HA, dizziness, diplopia, dysphagia/dysarthria, double vision, tinnitus, memory, drop attacks, other): Denies  Any long-term steroid use? Denies      Patient goals - reduce UI    OBJECTIVE FINDINGS:      ASSESSMENT   PELVIS/POSTURE            LUMBAR AROM    Flexion    Extension    rotation    Sidebending        HIP ROM    ER    IR    Flexion    Extension    Ankle ROM    Lower Extremity Strength    hip flexion    hip extension    Hip IR    Hip ER    Hip abduction    Knee    Ankle            SPECIAL TESTS    Hamstring length    Hip FADIR    Hip SCOUR    Trendelenburg    Slump test    SLR test    BOZENA            LUMBAR PALPATION    HIP PALPATION    OTHER PALPATION        SI JOINT TESTING     S/L Compression test    Thigh thrust test    Prone sacral thrust test    Gaenslen test        BALANCE    GAIT MECHANICS  "   RUNNING MECHANICS    SQUATTING MECHANICS Leans to the L; focus on heel contact throughout   LUNGING MECHANICS    BED MOBILITY    SIT TO STAND    FLOOR TO STAND      Verbal consent give for internal evaluation and treatment. yes    Pelvic Floor MMT and Function INITIAL EVALUATION  2/22/23 pelvic eval F/U ASSESSMENT 4/17/23 RE-EVAL 5/15/23   Consent to eval and treat yes  yes   Assessment position Hooklying, draped with sheet      EXTERNAL PFM EXAM No TTP B     PFM OBSERVATION      Skin integrity Atrophy, minor presence of labia minora     Contract Present, coordinates with breathing     Relax present     Bulge/pelvic drop present     Cough present     Perineal descent none     Vaginal wall laxity Pessary in place - did not assess     Light touch sensation intact     INTERNAL Assessment performed Internal vaginal      Levator Ani PERF        Power 3/5      Endurance 5      Repetitions 2 - well coordinated with breathing      Quick Flicks       Vaginal introitus loose     Preet-urethra No TTP     Levator palpation Min TTP  Dryness sensation  Mod tension     Puborectalis       Obturator Internus       Piriformis       TAILBONE            ABDOMEN      MMT strength       Diastasis Recti 1 finger width for 4\" preet-umbilicus  -   Pain Mild discomfort RA L>R  P! With pressure to that area following ab contraction  Denies diomfort   Breathing pattern WNL WNL  WNL   Hip flexors No TTP No TTP R TTP, L no TTP   Skin integumentary/incision lines Unremarkable, well healed lap incision lines unremarkable unremarkable          PFDI OUTCOME MEASURE 70.8 total: 12.5 POPDI, 12.5 CRAD, 45.8 DARNELL 41.6 total; 8.3 POPDI, 12.5 CRAD, 20.8 DARNELL 38.5 total; 8.3 POPDI; 9.4 CRAD; 20.8 DARNELL   Adductors       Biofeedback          TREATMENT LOG:  verbal consent for internal intervention: yes  Diagnosis  UI    Precautions       PT INITIAL EVALUATION:   2/14/23     PT PROGRESS NOTE:        Y/N Treatment Details: Time:   MODALITIES  51532   0 mins "   Heat       Ice       THER ACT          17653   30 mins   OUTCOME MEASURES Y PFDI    POC discussion Y One month check in appt to assess independence/progress    Review of symptoms  Tests/Measures y       Falls Screen, Medication Review, VS, pain assessment y      Education:  h    Y            h  h      h  h Pelvic floor anatomy/ purpose function    Intro to POP precautions: intro to breath with movements/exercise; no pushing/straining            -Reviewed today as pertains to pt report of pushing and straining over the weekend - reviewed why pushing and straining is not recommended and can worsen/complicate prolapse.     Weight loss goal discussion  Use of vaginal estrogen discussion - consider topical coconut oil vs replens vs reveree    Consider no coffee before walks, just the plain water  Be wary of lemon in water with bladder urgency and frequency    *Pt verbalized understanding of education and returned demonstration appropriately*    Bladder Education h Urgency suppression strategies, handout provided    Bowel Education      Toilet Posture edu/demo/practice Y Reviewed posture and handout was provided.     Discussed proper breathing for emptying         Postural edu h      h  h    h Squatting mechanics - bend from knees and hips; knee valgus; =WB, shift slightly to R; exhale with the lift  15x picking up bolster, 15x in isolation  TRX squats x10  TRX lunge 2x5 reps    Minimize kyphosis to minimize worsening POP and pressure on the bladder    HEP / HEP Review h  eval: TAC, TAC with add  2/22/23: bridge, hip abd, wall squat  2/27/23: KFO, shoulder row, SKTC str, piriformis str  3/27/23: hip ext<>march 4/5/23: tricep curl in supine  5/9/23: urinary urge suppression strategies      THER EX         57321   25 mins   SUPINE THEREX:   h  h    h  h  h  h    h  h TAC with exhale 10x8 sec  TAC with bolster squeeze ADD 10x5 sec  TAC with hip abd, gtb 10x3 sec  TAC with bridge 10x3 sec, gtb at knees  TAC with bridge  "ball squeeze 10x5 sec  TAC with march 5x3 alt reps, ball squeeze bn hands  TAC with march and ball squeeze bn hands and thighs  TAC with KFO    tricep curl - no wt first, 6# MB in B hands 2nd, 10-15 each  Chest press 6# MB 20x    *all with coordinated breathing    SEATED THEREX:   Y  h  h  h STS 2x6 with exhale - cued valgus L>R  TAC with bolster squeeze ADD 10x5 sec  TAC with hip abd, gtb 10x3 sec  TAC with KFO    STANDING THEREX:   h    h    h  h  h              h      Y        Y  Y    h  h Wall squat 5x slowly with coordinated exhale  Shoulder row 20x5 sec hold, with march  No money in front of mirror 20x5 sec    Shoulder flexion otb 15x  Shoulder abd otb 15x  Bicep curld otb 15x    March 10x with 6# MB, 05o2ens hold with 2 finger UE touch, 10x without UE support  2x8 lunge onto dynadisc w/ and w/o UE touch      TRX mini squats 2x15 reps - cud knee valgus, full foot contact, \"sit back in chair\"    Squats w/ bolster lift 2x10        Cued lifting mechanics         Cued breathing pattern    Static Lunge B 2x5  Side Lunges B x10    Standing march, otb at feet 20x  Tall plank at counter hip ext<>march 10x B    STRETCHING:         Y Hamstring str in seated 30 sec B  SKTC 3x20 sec  Piriformis str 3x20 sec  Supine HF Stretch (R only) x60s           NEURO RE-ED    10999   0 mins   Diaphragmatic breathing      Postural Re-Edu      Pelvic eval h edu on findings  Cued coordination of PFMC with exhale    Coordination with EMG Biofeedback  Pelvic floor muscle strengthening with [employer] biofeedback unit, external sensors placed at 9 and 3 o'clock of anal opening, grounding electrode placed on ischial tuberosity:   - SUPINE:   - SEATED:  - STANDING:      Biofeedback Objective Findings  SUPINE REST:  SUPINE WORK:  SEATED REST:  SEATED WORK:  STANDING REST:  STANDING WORK:    MANUAL                 69890   0 mins   Joint Mobilization       Deep Tissue mobilizations External      Deep Tissue Mobilization Internal    "   IASTM/MFR/TrP Release                PLAN:   - progress TE   - F/U in one month

## 2023-06-13 ENCOUNTER — HOSPITAL ENCOUNTER (OUTPATIENT)
Dept: PHYSICAL THERAPY | Age: 73
Setting detail: THERAPIES SERIES
Discharge: HOME | End: 2023-06-13
Attending: UROLOGY
Payer: MEDICARE

## 2023-06-13 DIAGNOSIS — N32.81 OAB (OVERACTIVE BLADDER): Primary | ICD-10-CM

## 2023-06-13 PROCEDURE — 97110 THERAPEUTIC EXERCISES: CPT | Mod: GP

## 2023-06-13 PROCEDURE — 97530 THERAPEUTIC ACTIVITIES: CPT | Mod: GP

## 2023-06-13 NOTE — ADDENDUM NOTE
Encounter addended by: Deann Waters, PT on: 6/13/2023 2:18 PM   Actions taken: Flowsheet accepted, Charge Capture section accepted, Letter saved

## 2023-06-13 NOTE — LETTER
[address]  [facility] [address]  [facility] Fax: [phone]    PHYSICAL THERAPY UPDATE    Patient Name: Georgina Briseno    Provider: Deann Waters PT  Referring Provider: Rocio Latham MD  PCP: Don Rand MD  Payor: Payor: MEDICARE / Plan: MEDICARE PART A & B / Product Type: Medicare /   Medical Diagnosis: OAB (overactive bladder) [N32.81]     Dear Dr. Latham,    Thank you for the referral of your patient Georgina Briseno for physical therapy. I am writing to you today to provide you an update on the status of your patient. Please review the latest progress note below and the goals being addressed during this plan of care.     If you have any questions or concerns, please feel free to contact me. Once again, thank you for your referral and the opportunity to work with your patient.     Sincerely,  Deann Waters PT    Rehab Potential:        Physical Therapy Discharge      PT DISCHARGE NOTE FOR OUTPATIENT THERAPY    Patient: Georgina Briseno MRN: [id number]  : 1950 72 y.o.  Referring Physician: Rocio Latham MD  Date of Visit: 2023      Certification Dates: 05/15/23 through 23    Total Visit Count: 12    Chief Complaints:  Chief Complaint   Patient presents with   • Pain   • Other     Bladder + Bowel       Precautions:  Existing Precautions/Restrictions: no known precautions/restrictions      TODAY'S VISIT:    Time In Session:  Start Time: 0805  Stop Time: 0900  Time Calculation (min): 55 min   General Information - 23 0807          Session Details    Document Type discharge evaluation     Mode of Treatment individual therapy        General Information    Referring Physician Dr. Rocio Latham     Patient/Family/Caregiver Comments/Observations Pt is feeling much better overall. Experienced another episode of light bleeding but pt confident related to bowels and pushing/straining with constipation. Pt following up with urogyn at next pessary  cleaning. Pt identified heavy carbohydrates such as bagels, rolls as triggering for constipation. Urinary leakage has improved, pt will sometimes have a dry pad. Pt identifies that coffee is still a trigger. Coughing/sneezing/exercise continues to cause leakage, but less. UUI has improved greatly, pt does not need to rush to the bathroom anymore, no issues in stores and with errands anymore. Pt is doing eliptical and strengthening machines at the gym as well as swimming. Was able to prepare meal for graduation party (4 hours standing and cooking) without pelvic pressure discomfort (improved from PLOF). Pt very pleased that she is not noting pelvic pressure discomfort any longer. Has questions about future exercise progression.    Existing Precautions/Restrictions no known precautions/restrictions                    Daily Falls Screen - 06/13/23 0807          Daily Falls Assessment    Patient reported fall since last visit No                    Pain/Vitals - 06/13/23 0807          Pain Assessment    Currently in pain No/Denies        Pain Intervention    Intervention  N/A     Post Intervention Comments N/A                    PT - 06/13/23 0807          Physical Therapy    Physical Therapy Specialty Pelvic Floor Program: Age 14+        PT Plan    PT Duration 8 weeks     PT Custom Frequency and Duration 2 check-ins, spaced one month apart. 2 visits totaly over next 8 weeks.     PT Cert From 05/15/23     PT Cert To 07/14/23     Date PT POC was sent to provider 05/15/23     Signed PT Plan of Care received?  No                    Assessment and Plan - 06/13/23 0807          Assessment    Plan of Care reviewed and patient/family in agreement Yes     Clinical Assessment Pt has made great improvements in UUI and prolapse sxs. NAOMI has improved but pt still experiencing intermittently. Reviewed standing hip strengthening for pt to utilize when at thre gym. Pt is appropriate for discharge at this time due to improvements and  independence with progression of HEP.                     OBJECTIVE MEASUREMENTS/DATA:      Outcome Measures          2/14/2023    08:13 3/13/2023    08:10 4/17/2023    09:18 5/15/2023    12:15 6/13/2023    08:07   PT SUBJECTIVE Outcome Measures   Other PFDI 70.8 total PFDI 119.8 total PFDI 41.6 total PFDI - 38.5 total PFDI: 45.9       Today's Treatment:    Education provided:  Yes: See treatment log for details of education provided    Pt is a 72 y.o. F with complaint of pelvic pressure and heaviness, using a pessary current; and complaints of daily UI.  Feels like sitting on a “ball” when not wearing a pessary. Using pessary right now and for last 1 year, with Dr. Mcdermott. Some bloody discharge prior to pessary, only noted one time since pessary in place.  Had recent US which was negative. Pt wants to avoid surgery for now.     Bladder: Comment:            Urination frequency Likely WNL, thinks about 8x/day   Urgency:  Can be urgent   Incontinence UUI first in the AM sometimes, maybe with running errands away from home    NAOMI with coughing/sneezing; lifting something heavy;   Usually a small amount of leak unless lifting something heavy    No UI stairs, walking, bed mobility   Pads Liner mult/day, will change when damp   Nocturia 5 hours without need to void, only sleeps 5 hours   Pain None   Emptying Complete  Pessary helped with starting the stream   Prolapse symptoms  Minimal with pessary, sometimes a discomfort, feels like she needs a break   Liquid consumption 3-4 quarts water  Decaf coffee in AM   UTI history    Irritants          Bowel: Comment:        Frequency Every other day   Urge WNL   Incontinence Avoids eggplant and food irritants   Emptying Complete, no pushing/straining   [place] stool smooth   Fiber Well rounded, fruits>veggies   Management Apricots   Pain None current    No bleeding current     OBGYN Comment:        Pregnancies 3   Births 3   Birth type Vaginal   Tears/ episiotomies Episiotomy x3    Surgery B ovaries removed due to cyst  2012  appendectomy   Menstruation Menopause    Last baby was 9#               Sexual Activity Comment:        Type None current     Pain None historically   Orgasm    Masturbation    Libido              Pain Comment:        L LBP on occasion 1/10 today   R shoulder/arm pain Has aggravated this with exercises in the past               Other Comment:        Physical activity  Walking 4-5 days/wk  [facility] on occasion  Free weights on arms   PLOF    Living environment withhusband   Other osteoporosis   Sleep Fall asleep in recliner  5 hours only  cpap             Systems Review:   Cord questions:  Pins and needles or tingling in both arms and both legs at the same time? denies  Problems with stumbling or falling? Denies     Cauda equina questions:  Problems with bowel and bladder control? Specifically retention? See above  Pins and needles or numbness in the saddle area? Denies     Review of systems:  General - (chills, night sweats, recent infection, fever, weight loss/gain, unexplained night pain, excessive fatigue): Denies  Do you have a history of cancer? Denies  Gastrointestinal system - (abdominal pain, bowel changes, nausea, vomiting, bloating): Denies  Cardiovascular system - (chest pain, palpitations, orthopnea, other): Denies  Respiratory system - (cough, SOB, sputum production, other): Denies  Musculoskeletal system: osteoporosis, vertebral fracture? Denies  Endocrine - (polyuria, polydipsia, heat or cold intolerance, other): Denies  Neurological - (numbness/tingling, falling/stumbling, HA, dizziness, diplopia, dysphagia/dysarthria, double vision, tinnitus, memory, drop attacks, other): Denies  Any long-term steroid use? Denies      Patient goals - reduce UI    OBJECTIVE FINDINGS:      ASSESSMENT 6/13/23   PELVIS/POSTURE  Min cueing for upright/tall posture required, tendency for increased lordosis              LUMBAR AROM     Flexion     Extension     rotation    "  Sidebending          HIP ROM     ER     IR     Flexion     Extension     Ankle ROM     Lower Extremity Strength     hip flexion     hip extension     Hip IR     Hip ER     Hip abduction     Knee     Ankle               SPECIAL TESTS     Hamstring length     Hip FADIR     Hip SCOUR     Trendelenburg     Slump test     SLR test     BOZENA               LUMBAR PALPATION     HIP PALPATION     OTHER PALPATION          SI JOINT TESTING      S/L Compression test     Thigh thrust test     Prone sacral thrust test     Gaenslen test          BALANCE  WNL B (L>R)   GAIT MECHANICS     RUNNING MECHANICS     SQUATTING MECHANICS Leans to the L; focus on heel contact throughout    LUNGING MECHANICS     BED MOBILITY     SIT TO STAND     FLOOR TO STAND       Verbal consent give for internal evaluation and treatment. yes    Pelvic Floor MMT and Function INITIAL EVALUATION  2/22/23 pelvic eval F/U ASSESSMENT 4/17/23 RE-EVAL 5/15/23 DISCHARGE 6/13/23   Consent to eval and treat yes  yes    Assessment position Hooklying, draped with sheet       EXTERNAL PFM EXAM No TTP B      PFM OBSERVATION       Skin integrity Atrophy, minor presence of labia minora      Contract Present, coordinates with breathing      Relax present      Bulge/pelvic drop present      Cough present      Perineal descent none      Vaginal wall laxity Pessary in place - did not assess      Light touch sensation intact      INTERNAL Assessment performed Internal vaginal       Levator Ani PERF         Power 3/5       Endurance 5       Repetitions 2 - well coordinated with breathing       Quick Flicks        Vaginal introitus loose      Preet-urethra No TTP      Levator palpation Min TTP  Dryness sensation  Mod tension      Puborectalis        Obturator Internus        Piriformis        TAILBONE              ABDOMEN       MMT strength        Diastasis Recti 1 finger width for 4\" preet-umbilicus  -    Pain Mild discomfort RA L>R  P! With pressure to that area following ab " contraction  Denies diomfort    Breathing pattern WNL WNL  WNL Min cueing for exhale with exertion    Hip flexors No TTP No TTP R TTP, L no TTP    Skin integumentary/incision lines Unremarkable, well healed lap incision lines unremarkable unremarkable            PFDI OUTCOME MEASURE 70.8 total: 12.5 POPDI, 12.5 CRAD, 45.8 DARNELL 41.6 total; 8.3 POPDI, 12.5 CRAD, 20.8 DARNELL 38.5 total; 8.3 POPDI; 9.4 CRAD; 20.8 DARNELL 45.9 total; 4.2 PODI, 12.5 CRAD, 29.2 DARNELL   Adductors        Biofeedback           TREATMENT LOG:  verbal consent for internal intervention: yes  Diagnosis  UI    Precautions       PT INITIAL EVALUATION:   2/14/23     PT PROGRESS NOTE:        Y/N Treatment Details: Time:   MODALITIES  68455   0 mins   Heat       Ice       THER ACT          65154   35 mins   OUTCOME MEASURES Y PFDI    POC discussion Y DISCHARGE, if wanting to return needs a script from MD    Review of symptoms  Tests/Measures y       Falls Screen, Medication Review, VS, pain assessment y      Education:  h    h            h  h      h  h        Y        y Pelvic floor anatomy/ purpose function    Intro to POP precautions: intro to breath with movements/exercise; no pushing/straining            -Reviewed today as pertains to pt report of pushing and straining over the weekend - reviewed why pushing and straining is not recommended and can worsen/complicate prolapse.     Weight loss goal discussion  Use of vaginal estrogen discussion - consider topical coconut oil vs replens vs reveree    Consider no coffee before walks, just the plain water  Be wary of lemon in water with bladder urgency and frequency    Exercise prescription: 5x/week 30 min cardio moderate intensity, 2-3x/week strengthening core/PFM focused, can utilize core breathing with strengthening machines at gym  Muscle hypertrophy rules and goals of strength training    *Pt verbalized understanding of education and returned demonstration appropriately*    Bladder Education h Urgency  "suppression strategies, handout provided    Bowel Education      Toilet Posture edu/demo/practice h Reviewed posture and handout was provided.     Discussed proper breathing for emptying         Postural edu h      h  h    h Squatting mechanics - bend from knees and hips; knee valgus; =WB, shift slightly to R; exhale with the lift  15x picking up bolster, 15x in isolation  TRX squats x10  TRX lunge 2x5 reps    Minimize kyphosis to minimize worsening POP and pressure on the bladder    HEP / HEP Review Y  eval: TAC, TAC with add  2/22/23: bridge, hip abd, wall squat  2/27/23: KFO, shoulder row, SKTC str, piriformis str  3/27/23: hip ext<>march 4/5/23: tricep curl in supine  5/9/23: urinary urge suppression strategies  6/13/23: Hamstring curl options for gym; hip ext      THER EX         [id number]   10 mins   SUPINE THEREX:   h  h    h  h  h  h    h  h TAC with exhale 10x8 sec  TAC with bolster squeeze ADD 10x5 sec  TAC with hip abd, gtb 10x3 sec  TAC with bridge 10x3 sec, gtb at knees  TAC with bridge ball squeeze 10x5 sec  TAC with march 5x3 alt reps, ball squeeze bn hands  TAC with march and ball squeeze bn hands and thighs  TAC with KFO    tricep curl - no wt first, 6# MB in B hands 2nd, 10-15 each  Chest press 6# MB 20x    *all with coordinated breathing    SEATED THEREX:   h  h  h  h STS 2x6 with exhale - cued valgus L>R  TAC with bolster squeeze ADD 10x5 sec  TAC with hip abd, gtb 10x3 sec  TAC with KFO    STANDING THEREX:                                                       Y      Y Wall squat 5x slowly with coordinated exhale  Shoulder row 20x5 sec hold, with march  No money in front of mirror 20x5 sec    Shoulder flexion otb 15x  Shoulder abd otb 15x  Bicep curld otb 15x    March 10x with 6# MB, [id number] hold with 2 finger UE touch, 10x without UE support  2x8 lunge onto dynadisc w/ and w/o UE touch      TRX mini squats 2x15 reps - cud knee valgus, full foot contact, \"sit back in chair\"    Squats w/ bolster lift " 2x10        Cued lifting mechanics         Cued breathing pattern    Static Lunge B 2x5  Side Lunges B x10    Standing march, otb at feet 20x  Tall plank at counter hip ext<>march 10x B    Hip Extensions otb     Band around ankles     Band tied to bar  Hamstring curls otb    STRETCHING:          Hamstring str in seated 30 sec B  SKTC 3x20 sec  Piriformis str 3x20 sec  Supine HF Stretch (R only) x60s           NEURO RE-ED    43370   0 mins   Diaphragmatic breathing      Postural Re-Edu      Pelvic eval h edu on findings  Cued coordination of PFMC with exhale    Coordination with EMG Biofeedback  Pelvic floor muscle strengthening with Swirl biofeedback unit, external sensors placed at 9 and 3 o'clock of anal opening, grounding electrode placed on ischial tuberosity:   - SUPINE:   - SEATED:  - STANDING:      Biofeedback Objective Findings  SUPINE REST:  SUPINE WORK:  SEATED REST:  SEATED WORK:  STANDING REST:  STANDING WORK:    MANUAL                 [id number]   0 mins   Joint Mobilization       Deep Tissue mobilizations External      Deep Tissue Mobilization Internal      IASTM/MFR/TrP Release                PLAN:  -DISCHARGE         Goals Addressed                      This Visit's Progress    •  <enter goal here> (pt-stated)   On track    •  Mutually agreed upon pain goal   On track      Mutually agreed upon pain goal: n/a    PATIENT STATED: reduce UI (MET for UUI, IMPROVING for NAOMI)      •  Pelvic PT Goals   On track      Pt will be I with initial pain management strategies and PFM relaxation/coordination/strengthening exercises to improve pt's bladder function: 3 wks MET    Pt will be able to reduce urinary freq to 3 hours ave, 90% of the time, for improved bladder function and attention to work/errand task: 6 wks IMPROVING    Pt will be able to successfully demonstrate urge suppression strategies to delay urinary urgency for 5 min, 90% of the time, for improved bladder function and attention to errand task: 8  wks MET    Pt will be I with diaphragmatic breathing for reducing tension, stress, pelvic pain complaints: 4 wks MET    Pt will demonstrate the knack with cough/laugh/sneeze, 90% of the time, to reduce risk of urinary incontinence: 8 wks IMPROVING    Pt will report urinary leakage no greater than 2x/day, for improved bladder function: 8 wks MET FOR UUI, IMPROVING FOR NAOMI    Pt will note 50% reduction in pad wetness, indicating improvement in UI: 12 wks MET    Pt will report urinary leakage no greater than 1x/wk, for improved bladder function: 12 wks IMPROVING     PFDI score will improve to below 55.8, indicating improvements in bladder function and QOL (70.8 on IE): 12 wks  MET (38.5 ON 5/15)    Pt will be I with progression of PFM strengthening exercises to maintain/improve strength and bladder function: 12 wks MET              Discharge information for [facility]:

## 2023-06-13 NOTE — OP PT TREATMENT LOG
Pt is a 72 y.o. F with complaint of pelvic pressure and heaviness, using a pessary current; and complaints of daily UI.  Feels like sitting on a “ball” when not wearing a pessary. Using pessary right now and for last 1 year, with Dr. Mcdermott. Some bloody discharge prior to pessary, only noted one time since pessary in place.  Had recent US which was negative. Pt wants to avoid surgery for now.     Bladder: Comment:            Urination frequency Likely WNL, thinks about 8x/day   Urgency:  Can be urgent   Incontinence UUI first in the AM sometimes, maybe with running errands away from home    NAOMI with coughing/sneezing; lifting something heavy;   Usually a small amount of leak unless lifting something heavy    No UI stairs, walking, bed mobility   Pads Liner mult/day, will change when damp   Nocturia 5 hours without need to void, only sleeps 5 hours   Pain None   Emptying Complete  Pessary helped with starting the stream   Prolapse symptoms  Minimal with pessary, sometimes a discomfort, feels like she needs a break   Liquid consumption 3-4 quarts water  Decaf coffee in AM   UTI history    Irritants          Bowel: Comment:        Frequency Every other day   Urge WNL   Incontinence Avoids eggplant and food irritants   Emptying Complete, no pushing/straining   M[place]ammon stool smooth   Fiber Well rounded, fruits>veggies   Management Apricots   Pain None current    No bleeding current     OBGYN Comment:        Pregnancies 3   Births 3   Birth type Vaginal   Tears/ episiotomies Episiotomy x3   Surgery B ovaries removed due to cyst  2012  appendectomy   Menstruation Menopause    Last baby was 9#               Sexual Activity Comment:        Type None current     Pain None historically   Orgasm    Masturbation    Libido              Pain Comment:        L LBP on occasion 1/10 today   R shoulder/arm pain Has aggravated this with exercises in the past               Other Comment:        Physical activity  Walking 4-5  days/wk  [facility]  Free weights on arms   PLOF    Living environment withhusband   Other osteoporosis   Sleep Fall asleep in recliner  5 hours only  cpap             Systems Review:   Cord questions:  Pins and needles or tingling in both arms and both legs at the same time? denies  Problems with stumbling or falling? Denies     Cauda equina questions:  Problems with bowel and bladder control? Specifically retention? See above  Pins and needles or numbness in the saddle area? Denies     Review of systems:  General - (chills, night sweats, recent infection, fever, weight loss/gain, unexplained night pain, excessive fatigue): Denies  Do you have a history of cancer? Denies  Gastrointestinal system - (abdominal pain, bowel changes, nausea, vomiting, bloating): Denies  Cardiovascular system - (chest pain, palpitations, orthopnea, other): Denies  Respiratory system - (cough, SOB, sputum production, other): Denies  Musculoskeletal system: osteoporosis, vertebral fracture? Denies  Endocrine - (polyuria, polydipsia, heat or cold intolerance, other): Denies  Neurological - (numbness/tingling, falling/stumbling, HA, dizziness, diplopia, dysphagia/dysarthria, double vision, tinnitus, memory, drop attacks, other): Denies  Any long-term steroid use? Denies      Patient goals - reduce UI    OBJECTIVE FINDINGS:      ASSESSMENT 6/13/23   PELVIS/POSTURE  Min cueing for upright/tall posture required, tendency for increased lordosis              LUMBAR AROM     Flexion     Extension     rotation     Sidebending          HIP ROM     ER     IR     Flexion     Extension     Ankle ROM     Lower Extremity Strength     hip flexion     hip extension     Hip IR     Hip ER     Hip abduction     Knee     Ankle               SPECIAL TESTS     Hamstring length     Hip FADIR     Hip SCOUR     Trendelenburg     Slump test     SLR test     BOZENA               LUMBAR PALPATION     HIP PALPATION     OTHER PALPATION          SI JOINT  "TESTING      S/L Compression test     Thigh thrust test     Prone sacral thrust test     Gaenslen test          BALANCE  WNL B (L>R)   GAIT MECHANICS     RUNNING MECHANICS     SQUATTING MECHANICS Leans to the L; focus on heel contact throughout    LUNGING MECHANICS     BED MOBILITY     SIT TO STAND     FLOOR TO STAND       Verbal consent give for internal evaluation and treatment. yes    Pelvic Floor MMT and Function INITIAL EVALUATION  2/22/23 pelvic eval F/U ASSESSMENT 4/17/23 RE-EVAL 5/15/23 DISCHARGE 6/13/23   Consent to eval and treat yes  yes    Assessment position Hooklying, draped with sheet       EXTERNAL PFM EXAM No TTP B      PFM OBSERVATION       Skin integrity Atrophy, minor presence of labia minora      Contract Present, coordinates with breathing      Relax present      Bulge/pelvic drop present      Cough present      Perineal descent none      Vaginal wall laxity Pessary in place - did not assess      Light touch sensation intact      INTERNAL Assessment performed Internal vaginal       Levator Ani PERF         Power 3/5       Endurance 5       Repetitions 2 - well coordinated with breathing       Quick Flicks        Vaginal introitus loose      Preet-urethra No TTP      Levator palpation Min TTP  Dryness sensation  Mod tension      Puborectalis        Obturator Internus        Piriformis        TAILBONE              ABDOMEN       MMT strength        Diastasis Recti 1 finger width for 4\" preet-umbilicus  -    Pain Mild discomfort RA L>R  P! With pressure to that area following ab contraction  Denies diomfort    Breathing pattern WNL WNL  WNL Min cueing for exhale with exertion    Hip flexors No TTP No TTP R TTP, L no TTP    Skin integumentary/incision lines Unremarkable, well healed lap incision lines unremarkable unremarkable            PFDI OUTCOME MEASURE 70.8 total: 12.5 POPDI, 12.5 CRAD, 45.8 DARNELL 41.6 total; 8.3 POPDI, 12.5 CRAD, 20.8 DARNELL 38.5 total; 8.3 POPDI; 9.4 CRAD; 20.8 DARNELL 45.9 total; 4.2 " PODI, 12.5 CRAD, 29.2 DARNELL   Adductors        Biofeedback           TREATMENT LOG:  verbal consent for internal intervention: yes  Diagnosis  UI    Precautions       PT INITIAL EVALUATION:   2/14/23     PT PROGRESS NOTE:        Y/N Treatment Details: Time:   MODALITIES  18867   0 mins   Heat       Ice       THER ACT          19301   20 mins   OUTCOME MEASURES Y PFDI    POC discussion Y DISCHARGE, if wanting to return needs a script from MD    Review of symptoms  Tests/Measures y       Falls Screen, Medication Review, VS, pain assessment y      Education:  h    h            h  h      h  h        Y Pelvic floor anatomy/ purpose function    Intro to POP precautions: intro to breath with movements/exercise; no pushing/straining            -Reviewed today as pertains to pt report of pushing and straining over the weekend - reviewed why pushing and straining is not recommended and can worsen/complicate prolapse.     Weight loss goal discussion  Use of vaginal estrogen discussion - consider topical coconut oil vs replens vs reveree    Consider no coffee before walks, just the plain water  Be wary of lemon in water with bladder urgency and frequency    Exercise prescription: 5x/week 30 min cardio moderate intensity, 2-3x/week strengthening core/PFM focused, can utilise core breathing with strengthening machines at gym    *Pt verbalized understanding of education and returned demonstration appropriately*    Bladder Education h Urgency suppression strategies, handout provided    Bowel Education      Toilet Posture edu/demo/practice h Reviewed posture and handout was provided.     Discussed proper breathing for emptying         Postural edu h      h  h    h Squatting mechanics - bend from knees and hips; knee valgus; =WB, shift slightly to R; exhale with the lift  15x picking up bolster, 15x in isolation  TRX squats x10  TRX lunge 2x5 reps    Minimize kyphosis to minimize worsening POP and pressure on the bladder    HEP / HEP  "Review Y  eval: TAC, TAC with add  2/22/23: bridge, hip abd, wall squat  2/27/23: KFO, shoulder row, SKTC str, piriformis str  3/27/23: hip ext<>march 4/5/23: tricep curl in supine  5/9/23: urinary urge suppression strategies  6/13/23: Hamstring curl options for gym      THER EX         [id number]   10 mins   SUPINE THEREX:   h  h    h  h  h  h    h  h TAC with exhale 10x8 sec  TAC with bolster squeeze ADD 10x5 sec  TAC with hip abd, gtb 10x3 sec  TAC with bridge 10x3 sec, gtb at knees  TAC with bridge ball squeeze 10x5 sec  TAC with march 5x3 alt reps, ball squeeze bn hands  TAC with march and ball squeeze bn hands and thighs  TAC with KFO    tricep curl - no wt first, 6# MB in B hands 2nd, 10-15 each  Chest press 6# MB 20x    *all with coordinated breathing    SEATED THEREX:   h  h  h  h STS 2x6 with exhale - cued valgus L>R  TAC with bolster squeeze ADD 10x5 sec  TAC with hip abd, gtb 10x3 sec  TAC with KFO    STANDING THEREX:                                                       Y      Y Wall squat 5x slowly with coordinated exhale  Shoulder row 20x5 sec hold, with march  No money in front of mirror 20x5 sec    Shoulder flexion otb 15x  Shoulder abd otb 15x  Bicep curld otb 15x    March 10x with 6# MB, [id number] hold with 2 finger UE touch, 10x without UE support  2x8 lunge onto dynadisc w/ and w/o UE touch      TRX mini squats 2x15 reps - cud knee valgus, full foot contact, \"sit back in chair\"    Squats w/ bolster lift 2x10        Cued lifting mechanics         Cued breathing pattern    Static Lunge B 2x5  Side Lunges B x10    Standing march, otb at feet 20x  Tall plank at counter hip ext<>march 10x B    Hip Extensions otb     Band around ankles     Band tied to bar  Hamstring curls otb    STRETCHING:          Hamstring str in seated 30 sec B  SKTC 3x20 sec  Piriformis str 3x20 sec  Supine HF Stretch (R only) x60s           NEURO RE-ED    25817   0 mins   Diaphragmatic breathing      Postural Re-Edu      Pelvic eval " h edu on findings  Cued coordination of PFMC with exhale    Coordination with EMG Biofeedback  Pelvic floor muscle strengthening with [employer] biofeedback unit, external sensors placed at 9 and 3 o'clock of anal opening, grounding electrode placed on ischial tuberosity:   - SUPINE:   - SEATED:  - STANDING:      Biofeedback Objective Findings  SUPINE REST:  SUPINE WORK:  SEATED REST:  SEATED WORK:  STANDING REST:  STANDING WORK:    MANUAL                 63888   0 mins   Joint Mobilization       Deep Tissue mobilizations External      Deep Tissue Mobilization Internal      IASTM/MFR/TrP Release                PLAN:  -DISCHARGE

## 2023-06-13 NOTE — PROGRESS NOTES
Physical Therapy Discharge      PT DISCHARGE NOTE FOR OUTPATIENT THERAPY    Patient: Georgina Briseno MRN: [id number]  : 1950 72 y.o.  Referring Physician: Rocio Latham MD  Date of Visit: 2023      Certification Dates: 05/15/23 through 23    Total Visit Count: 12    Chief Complaints:  Chief Complaint   Patient presents with    Pain    Other     Bladder + Bowel       Precautions:  Existing Precautions/Restrictions: no known precautions/restrictions      TODAY'S VISIT:    Time In Session:  Start Time: 805  Stop Time: 900  Time Calculation (min): 55 min   General Information - 23 0807          Session Details    Document Type discharge evaluation     Mode of Treatment individual therapy        General Information    Referring Physician Dr. Rocio Latham     Patient/Family/Caregiver Comments/Observations Pt is feeling much better overall. Experienced another episode of light bleeding but pt confident related to bowels and pushing/straining with constipation. Pt following up with urogyn at next pessary cleaning. Pt identified heavy carbohydrates such as bagels, rolls as triggering for constipation. Urinary leakage has improved, pt will sometimes have a dry pad. Pt identifies that coffee is still a trigger. Coughing/sneezing/exercise continues to cause leakage, but less. UUI has improved greatly, pt does not need to rush to the bathroom anymore, no issues in stores and with errands anymore. Pt is doing eliptical and strengthening machines at the gym as well as swimming. Was able to prepare meal for graduation party (4 hours standing and cooking) without pelvic pressure discomfort (improved from PLOF). Pt very pleased that she is not noting pelvic pressure discomfort any longer. Has questions about future exercise progression.    Existing Precautions/Restrictions no known precautions/restrictions                    Daily Falls Screen - 23 0807          Daily Falls Assessment    Patient  reported fall since last visit No                    Pain/Vitals - 06/13/23 0807          Pain Assessment    Currently in pain No/Denies        Pain Intervention    Intervention  N/A     Post Intervention Comments N/A                    PT - 06/13/23 0807          Physical Therapy    Physical Therapy Specialty Pelvic Floor Program: Age 14+        PT Plan    PT Duration 8 weeks     PT Custom Frequency and Duration 2 check-ins, spaced one month apart. 2 visits totaly over next 8 weeks.     PT Cert From 05/15/23     PT Cert To 07/14/23     Date PT POC was sent to provider 05/15/23     Signed PT Plan of Care received?  No                    Assessment and Plan - 06/13/23 0807          Assessment    Plan of Care reviewed and patient/family in agreement Yes     Clinical Assessment Pt has made great improvements in UUI and prolapse sxs. NAOMI has improved but pt still experiencing intermittently. Reviewed standing hip strengthening for pt to utilize when at thre gym. Pt is appropriate for discharge at this time due to improvements and independence with progression of HEP.                     OBJECTIVE MEASUREMENTS/DATA:      Outcome Measures          2/14/2023    08:13 3/13/2023    08:10 4/17/2023    09:18 5/15/2023    12:15 6/13/2023    08:07   PT SUBJECTIVE Outcome Measures   Other PFDI 70.8 total PFDI 119.8 total PFDI 41.6 total PFDI - 38.5 total PFDI: 45.9       Today's Treatment:    Education provided:  Yes: See treatment log for details of education provided    Pt is a 72 y.o. F with complaint of pelvic pressure and heaviness, using a pessary current; and complaints of daily UI.  Feels like sitting on a “ball” when not wearing a pessary. Using pessary right now and for last 1 year, with Dr. Mcdermott. Some bloody discharge prior to pessary, only noted one time since pessary in place.  Had recent US which was negative. Pt wants to avoid surgery for now.     Bladder: Comment:            Urination frequency Likely WNL,  thinks about 8x/day   Urgency:  Can be urgent   Incontinence UUI first in the AM sometimes, maybe with running errands away from home    NAOMI with coughing/sneezing; lifting something heavy;   Usually a small amount of leak unless lifting something heavy    No UI stairs, walking, bed mobility   Pads Liner mult/day, will change when damp   Nocturia 5 hours without need to void, only sleeps 5 hours   Pain None   Emptying Complete  Pessary helped with starting the stream   Prolapse symptoms  Minimal with pessary, sometimes a discomfort, feels like she needs a break   Liquid consumption 3-4 quarts water  Decaf coffee in AM   UTI history    Irritants          Bowel: Comment:        Frequency Every other day   Urge WNL   Incontinence Avoids eggplant and food irritants   Emptying Complete, no pushing/straining   [place]ondon stool smooth   Fiber Well rounded, fruits>veggies   Management Apricots   Pain None current    No bleeding current     OBGYN Comment:        Pregnancies 3   Births 3   Birth type Vaginal   Tears/ episiotomies Episiotomy x3   Surgery B ovaries removed due to cyst  2012  appendectomy   Menstruation Menopause    Last baby was 9#               Sexual Activity Comment:        Type None current     Pain None historically   Orgasm    Masturbation    Libido              Pain Comment:        L LBP on occasion 1/10 today   R shoulder/arm pain Has aggravated this with exercises in the past               Other Comment:        Physical activity  Walking 4-5 days/wk  [facility]  Free weights on arms   PLOF    Living environment withhusband   Other osteoporosis   Sleep Fall asleep in recliner  5 hours only  cpap             Systems Review:   Cord questions:  Pins and needles or tingling in both arms and both legs at the same time? denies  Problems with stumbling or falling? Denies     Cauda equina questions:  Problems with bowel and bladder control? Specifically retention? See above  Pins and needles or numbness  in the saddle area? Denies     Review of systems:  General - (chills, night sweats, recent infection, fever, weight loss/gain, unexplained night pain, excessive fatigue): Denies  Do you have a history of cancer? Denies  Gastrointestinal system - (abdominal pain, bowel changes, nausea, vomiting, bloating): Denies  Cardiovascular system - (chest pain, palpitations, orthopnea, other): Denies  Respiratory system - (cough, SOB, sputum production, other): Denies  Musculoskeletal system: osteoporosis, vertebral fracture? Denies  Endocrine - (polyuria, polydipsia, heat or cold intolerance, other): Denies  Neurological - (numbness/tingling, falling/stumbling, HA, dizziness, diplopia, dysphagia/dysarthria, double vision, tinnitus, memory, drop attacks, other): Denies  Any long-term steroid use? Denies      Patient goals - reduce UI    OBJECTIVE FINDINGS:      ASSESSMENT 6/13/23   PELVIS/POSTURE  Min cueing for upright/tall posture required, tendency for increased lordosis              LUMBAR AROM     Flexion     Extension     rotation     Sidebending          HIP ROM     ER     IR     Flexion     Extension     Ankle ROM     Lower Extremity Strength     hip flexion     hip extension     Hip IR     Hip ER     Hip abduction     Knee     Ankle               SPECIAL TESTS     Hamstring length     Hip FADIR     Hip SCOUR     Trendelenburg     Slump test     SLR test     BOZENA               LUMBAR PALPATION     HIP PALPATION     OTHER PALPATION          SI JOINT TESTING      S/L Compression test     Thigh thrust test     Prone sacral thrust test     Gaenslen test          BALANCE  WNL B (L>R)   GAIT MECHANICS     RUNNING MECHANICS     SQUATTING MECHANICS Leans to the L; focus on heel contact throughout    LUNGING MECHANICS     BED MOBILITY     SIT TO STAND     FLOOR TO STAND       Verbal consent give for internal evaluation and treatment. yes    Pelvic Floor MMT and Function INITIAL EVALUATION  2/22/23 pelvic eval F/U ASSESSMENT  "4/17/23 RE-EVAL 5/15/23 DISCHARGE 6/13/23   Consent to eval and treat yes  yes    Assessment position Hooklying, draped with sheet       EXTERNAL PFM EXAM No TTP B      PFM OBSERVATION       Skin integrity Atrophy, minor presence of labia minora      Contract Present, coordinates with breathing      Relax present      Bulge/pelvic drop present      Cough present      Perineal descent none      Vaginal wall laxity Pessary in place - did not assess      Light touch sensation intact      INTERNAL Assessment performed Internal vaginal       Levator Ani PERF         Power 3/5       Endurance 5       Repetitions 2 - well coordinated with breathing       Quick Flicks        Vaginal introitus loose      Preet-urethra No TTP      Levator palpation Min TTP  Dryness sensation  Mod tension      Puborectalis        Obturator Internus        Piriformis        TAILBONE              ABDOMEN       MMT strength        Diastasis Recti 1 finger width for 4\" preet-umbilicus  -    Pain Mild discomfort RA L>R  P! With pressure to that area following ab contraction  Denies diomfort    Breathing pattern WNL WNL  WNL Min cueing for exhale with exertion    Hip flexors No TTP No TTP R TTP, L no TTP    Skin integumentary/incision lines Unremarkable, well healed lap incision lines unremarkable unremarkable            PFDI OUTCOME MEASURE 70.8 total: 12.5 POPDI, 12.5 CRAD, 45.8 DARNELL 41.6 total; 8.3 POPDI, 12.5 CRAD, 20.8 DARNELL 38.5 total; 8.3 POPDI; 9.4 CRAD; 20.8 DARNELL 45.9 total; 4.2 PODI, 12.5 CRAD, 29.2 DARNELL   Adductors        Biofeedback           TREATMENT LOG:  verbal consent for internal intervention: yes  Diagnosis  UI    Precautions       PT INITIAL EVALUATION:   2/14/23     PT PROGRESS NOTE:        Y/N Treatment Details: Time:   MODALITIES  70605   0 mins   Heat       Ice       THER ACT          56056   35 mins   OUTCOME MEASURES Y PFDI    POC discussion Y DISCHARGE, if wanting to return needs a script from MD    Review of " symptoms  Tests/Measures y       Falls Screen, Medication Review, VS, pain assessment y      Education:  h    h            h  h      h  h        Y        y Pelvic floor anatomy/ purpose function    Intro to POP precautions: intro to breath with movements/exercise; no pushing/straining            -Reviewed today as pertains to pt report of pushing and straining over the weekend - reviewed why pushing and straining is not recommended and can worsen/complicate prolapse.     Weight loss goal discussion  Use of vaginal estrogen discussion - consider topical coconut oil vs replens vs reveree    Consider no coffee before walks, just the plain water  Be wary of lemon in water with bladder urgency and frequency    Exercise prescription: 5x/week 30 min cardio moderate intensity, 2-3x/week strengthening core/PFM focused, can utilize core breathing with strengthening machines at gym  Muscle hypertrophy rules and goals of strength training    *Pt verbalized understanding of education and returned demonstration appropriately*    Bladder Education h Urgency suppression strategies, handout provided    Bowel Education      Toilet Posture edu/demo/practice h Reviewed posture and handout was provided.     Discussed proper breathing for emptying         Postural edu h      h  h    h Squatting mechanics - bend from knees and hips; knee valgus; =WB, shift slightly to R; exhale with the lift  15x picking up bolster, 15x in isolation  TRX squats x10  TRX lunge 2x5 reps    Minimize kyphosis to minimize worsening POP and pressure on the bladder    HEP / HEP Review Y  eval: TAC, TAC with add  2/22/23: bridge, hip abd, wall squat  2/27/23: KFO, shoulder row, SKTC str, piriformis str  3/27/23: hip ext<>march 4/5/23: tricep curl in supine  5/9/23: urinary urge suppression strategies  6/13/23: Hamstring curl options for gym; hip ext      THER EX         17166   10 mins   SUPINE THEREX:   h  h    h  h  h  h    h  h TAC with exhale 10x8 sec  TAC  "with bolster squeeze ADD 10x5 sec  TAC with hip abd, gtb 10x3 sec  TAC with bridge 10x3 sec, gtb at knees  TAC with bridge ball squeeze 10x5 sec  TAC with march 5x3 alt reps, ball squeeze bn hands  TAC with march and ball squeeze bn hands and thighs  TAC with KFO    tricep curl - no wt first, 6# MB in B hands 2nd, 10-15 each  Chest press 6# MB 20x    *all with coordinated breathing    SEATED THEREX:   h  h  h  h STS 2x6 with exhale - cued valgus L>R  TAC with bolster squeeze ADD 10x5 sec  TAC with hip abd, gtb 10x3 sec  TAC with KFO    STANDING THEREX:                                                       Y      Y Wall squat 5x slowly with coordinated exhale  Shoulder row 20x5 sec hold, with march  No money in front of mirror 20x5 sec    Shoulder flexion otb 15x  Shoulder abd otb 15x  Bicep curld otb 15x    March 10x with 6# MB, [id number] hold with 2 finger UE touch, 10x without UE support  2x8 lunge onto dynadisc w/ and w/o UE touch      TRX mini squats 2x15 reps - cud knee valgus, full foot contact, \"sit back in chair\"    Squats w/ bolster lift 2x10        Cued lifting mechanics         Cued breathing pattern    Static Lunge B 2x5  Side Lunges B x10    Standing march, otb at feet 20x  Tall plank at counter hip ext<>march 10x B    Hip Extensions otb     Band around ankles     Band tied to bar  Hamstring curls otb    STRETCHING:          Hamstring str in seated 30 sec B  SKTC 3x20 sec  Piriformis str 3x20 sec  Supine HF Stretch (R only) x60s           NEURO RE-ED    94775   0 mins   Diaphragmatic breathing      Postural Re-Edu      Pelvic eval h edu on findings  Cued coordination of PFMC with exhale    Coordination with EMG Biofeedback  Pelvic floor muscle strengthening with [employer] biofeedback unit, external sensors placed at 9 and 3 o'clock of anal opening, grounding electrode placed on ischial tuberosity:   - SUPINE:   - SEATED:  - STANDING:      Biofeedback Objective Findings  SUPINE REST:  SUPINE WORK:  SEATED " REST:  SEATED WORK:  STANDING REST:  STANDING WORK:    MANUAL                 07713   0 mins   Joint Mobilization       Deep Tissue mobilizations External      Deep Tissue Mobilization Internal      IASTM/MFR/TrP Release                PLAN:  -DISCHARGE         Goals Addressed                      This Visit's Progress      <enter goal here> (pt-stated)   On track      Mutually agreed upon pain goal   On track      Mutually agreed upon pain goal: n/a    PATIENT STATED: reduce UI (MET for UUI, IMPROVING for NAOMI)        Pelvic PT Goals   On track      Pt will be I with initial pain management strategies and PFM relaxation/coordination/strengthening exercises to improve pt's bladder function: 3 wks MET    Pt will be able to reduce urinary freq to 3 hours ave, 90% of the time, for improved bladder function and attention to work/errand task: 6 wks IMPROVING    Pt will be able to successfully demonstrate urge suppression strategies to delay urinary urgency for 5 min, 90% of the time, for improved bladder function and attention to errand task: 8 wks MET    Pt will be I with diaphragmatic breathing for reducing tension, stress, pelvic pain complaints: 4 wks MET    Pt will demonstrate the knack with cough/laugh/sneeze, 90% of the time, to reduce risk of urinary incontinence: 8 wks IMPROVING    Pt will report urinary leakage no greater than 2x/day, for improved bladder function: 8 wks MET FOR UUI, IMPROVING FOR NAOMI    Pt will note 50% reduction in pad wetness, indicating improvement in UI: 12 wks MET    Pt will report urinary leakage no greater than 1x/wk, for improved bladder function: 12 wks IMPROVING     PFDI score will improve to below 55.8, indicating improvements in bladder function and QOL (70.8 on IE): 12 wks  MET (38.5 ON 5/15)    Pt will be I with progression of PFM strengthening exercises to maintain/improve strength and bladder function: 12 wks MET              Discharge information for [facility]:

## 2023-08-09 ENCOUNTER — TRANSCRIBE ORDERS (OUTPATIENT)
Dept: SCHEDULING | Age: 73
End: 2023-08-09

## 2023-08-09 DIAGNOSIS — N20.0 CALCULUS OF KIDNEY: Primary | ICD-10-CM

## 2023-09-07 ENCOUNTER — HOSPITAL ENCOUNTER (OUTPATIENT)
Dept: RADIOLOGY | Age: 73
Discharge: HOME | End: 2023-09-07
Attending: UROLOGY
Payer: MEDICARE

## 2023-09-07 DIAGNOSIS — N20.0 CALCULUS OF KIDNEY: ICD-10-CM

## 2023-09-07 PROCEDURE — 76775 US EXAM ABDO BACK WALL LIM: CPT

## 2023-10-13 ENCOUNTER — TRANSCRIBE ORDERS (OUTPATIENT)
Dept: SCHEDULING | Age: 73
End: 2023-10-13

## 2023-10-13 DIAGNOSIS — Z12.31 ENCOUNTER FOR SCREENING MAMMOGRAM FOR MALIGNANT NEOPLASM OF BREAST: Primary | ICD-10-CM

## 2023-10-30 ENCOUNTER — HOSPITAL ENCOUNTER (OUTPATIENT)
Dept: RADIOLOGY | Age: 73
Discharge: HOME | End: 2023-10-30
Attending: OBSTETRICS & GYNECOLOGY
Payer: MEDICARE

## 2023-10-30 DIAGNOSIS — Z12.31 ENCOUNTER FOR SCREENING MAMMOGRAM FOR MALIGNANT NEOPLASM OF BREAST: ICD-10-CM

## 2023-10-30 PROCEDURE — 77067 SCR MAMMO BI INCL CAD: CPT

## 2024-02-20 NOTE — PROGRESS NOTES
Chief Complaint:   Chief Complaint   Patient presents with   • Rectal Bleeding   • Follow-up       HPI     Patient is a 73 y.o. female who presents with the following:      Int bleeding hems:  08/04/21 - sx - some blood on TP every few months - PE mod int hems - plan observe    2-3 x per year - blood on TP - red - not on clothes - no dripping    No pain     One time was after hard BM        Medical History:   Past Medical History:   Diagnosis Date   • Arthritis     balbina. knees   • Bladder prolapse, female, acquired     hx of   • Bronchitis     hx of   • Concussion     hx of   • IBS (irritable bowel syndrome)     hx of   • Kidney stones     hx of   • Lipid disorder    • Murmur, cardiac     hx of   • Osteoporosis    • Sleep apnea        Surgical History:   Past Surgical History:   Procedure Laterality Date   • HERNIA REPAIR  2000    umbilical   • JOINT REPLACEMENT     • KIDNEY SURGERY      stones   • KNEE ARTHROSCOPY Bilateral    • OOPHORECTOMY Bilateral    • SKIN BIOPSY         Social History:   Social History     Socioeconomic History   • Marital status:      Spouse name: Not on file   • Number of children: Not on file   • Years of education: Not on file   • Highest education level: Not on file   Occupational History   • Occupation: part-time organist and    Tobacco Use   • Smoking status: Never   • Smokeless tobacco: Never   Vaping Use   • Vaping Use: Never used   Substance and Sexual Activity   • Alcohol use: Yes     Comment: rare   • Drug use: Never   • Sexual activity: Defer     Comment:    Other Topics Concern   • Not on file   Social History Narrative   • Not on file     Social Determinants of Health     Financial Resource Strain: Not on file   Food Insecurity: Not on file   Transportation Needs: Not on file   Physical Activity: Not on file   Stress: Not on file   Social Connections: Not on file   Intimate Partner Violence: Not on file   Housing Stability: Not on file       Family  History:   Family History   Problem Relation Age of Onset   • Pulmonary fibrosis Biological Mother    • Heart disease Biological Mother    • Transient ischemic attack Biological Father    • Stroke Biological Father    • Heart disease Biological Father    • Heart disease Biological Brother    • Diabetes Biological Brother    • Hyperlipidemia Biological Brother    • Hyperlipidemia Biological Brother    • Hypertension Biological Brother    • Pancreatic cancer Biological Brother    • No Known Problems Biological Brother        Allergies:   Sulfa (sulfonamide antibiotics), Epinephrine, Phenylephrine, Pseudoephedrine, and Triprolidine    Current Medications:      Current Outpatient Medications:   •  ascorbic acid, vitamin C, 500 mg tablet,chewable, Take 1 tablet by mouth daily.  , Disp: , Rfl:   •  cholecalciferol (VITAMIN D3) 50,000 unit tablet, Take 3,000 Units by mouth daily., Disp: , Rfl:   •  glucosamine sulfate 500 mg tablet, Take 1 tablet by mouth daily.  , Disp: , Rfl:   •  multivitamin with minerals tablet, Take 1 tablet by mouth daily.  , Disp: , Rfl:   •  rosuvastatin (CRESTOR) 5 mg tablet, Take 5 mg by mouth daily., Disp: , Rfl:   •  vitB6/mag cit,ox/potassium cit (THERALITH XR ORAL), Take 2 tablets by mouth 2 (two) times a day.  , Disp: , Rfl:   •  zoledronic acid/mannitol-water (RECLAST IV), Infuse into a venous catheter., Disp: , Rfl:   •  calc-D3-magnes-J7-Hl-Uh-park 250 mg-400 unit -40 mg-5 mg tablet, Take 1 tablet by mouth daily.  , Disp: , Rfl:     Review of Systems  All 14 systems reviewed and the findings are not pertinent to the current problem.    Objective     Vital Signs  There were no vitals filed for this visit.  Body mass index is 38.09 kg/m².      Physical Exam  General Appearance:  Well developed.  Well nourished.  In no acute distress.    Anorectal Examination:    No pilonidal sinus was observed.   No pilonidal cyst was observed.   Perianal skin was not erythematous.  No perianal wart was  observed.  No anal fissure was observed.   Anus did not have a fistula.   Anal sphincter tone was normal.   No hemorrhoids were seen.   No external anal skin tags were observed.   Anus had no mass.  Rectum:  No rectal abscess was observed.   Rectal prolapse was not observed.   No rectal fistula was observed.   Rectal exam was not tender.   No rectal fluctuance was observed.  Rectal exam showed no mass.   Normal preet-anal skin with no lesions or dermatitis    Min hems  No mass          Problem List Items Addressed This Visit     Internal hemorrhoid, bleeding     She has very low grade rectal bleeding and on exam has minimal hemorrhoids and no mass.  I am again content to observe her but I suggested she get another colonoscopy before 2027.                Pan Mcclellan MD 2/27/2024 11:29 AM

## 2024-02-21 DIAGNOSIS — M81.0 AGE-RELATED OSTEOPOROSIS WITHOUT CURRENT PATHOLOGICAL FRACTURE: ICD-10-CM

## 2024-02-21 DIAGNOSIS — E55.9 VITAMIN D DEFICIENCY: Primary | ICD-10-CM

## 2024-02-27 ENCOUNTER — OFFICE VISIT (OUTPATIENT)
Dept: SURGERY | Facility: CLINIC | Age: 74
End: 2024-02-27
Payer: MEDICARE

## 2024-02-27 VITALS — BODY MASS INDEX: 38.09 KG/M2 | HEIGHT: 63 IN | WEIGHT: 215 LBS

## 2024-02-27 DIAGNOSIS — K64.8 INTERNAL HEMORRHOID, BLEEDING: ICD-10-CM

## 2024-02-27 PROCEDURE — 99213 OFFICE O/P EST LOW 20 MIN: CPT | Performed by: SURGERY

## 2024-02-27 NOTE — LETTER
February 27, 2024     Don Rand MD  [address] [facility]  [address]  [place] [address]    Patient: Em Briseno  YOB: 1950  Date of Visit: 2/27/2024      Dear Dr. Rand:    Thank you for referring Em Briseno to me for evaluation. Below are my notes for this consultation.    If you have questions, please do not hesitate to call me. I look forward to following your patient along with you.         Sincerely,        Pan Mcclellan MD        CC: No Recipients    Pan Mcclellan MD  2/27/2024 11:29 AM  Sign when Signing Visit      Chief Complaint:   Chief Complaint   Patient presents with   • Rectal Bleeding   • Follow-up       HPI     Patient is a 73 y.o. female who presents with the following:      Int bleeding hems:  08/04/21 - sx - some blood on TP every few months - PE mod int hems - plan observe    2-3 x per year - blood on TP - red - not on clothes - no dripping    No pain     One time was after hard BM        Medical History:   Past Medical History:   Diagnosis Date   • Arthritis     balbina. knees   • Bladder prolapse, female, acquired     hx of   • Bronchitis     hx of   • Concussion     hx of   • IBS (irritable bowel syndrome)     hx of   • Kidney stones     hx of   • Lipid disorder    • Murmur, cardiac     hx of   • Osteoporosis    • Sleep apnea        Surgical History:   Past Surgical History:   Procedure Laterality Date   • HERNIA REPAIR  2000    umbilical   • JOINT REPLACEMENT     • KIDNEY SURGERY      stones   • KNEE ARTHROSCOPY Bilateral    • OOPHORECTOMY Bilateral    • SKIN BIOPSY         Social History:   Social History     Socioeconomic History   • Marital status:      Spouse name: Not on file   • Number of children: Not on file   • Years of education: Not on file   • Highest education level: Not on file   Occupational History   • Occupation: part-time organist and    Tobacco Use   • Smoking status: Never   • Smokeless tobacco: Never   Vaping Use   •  Vaping Use: Never used   Substance and Sexual Activity   • Alcohol use: Yes     Comment: rare   • Drug use: Never   • Sexual activity: Defer     Comment:    Other Topics Concern   • Not on file   Social History Narrative   • Not on file     Social Determinants of Health     Financial Resource Strain: Not on file   Food Insecurity: Not on file   Transportation Needs: Not on file   Physical Activity: Not on file   Stress: Not on file   Social Connections: Not on file   Intimate Partner Violence: Not on file   Housing Stability: Not on file       Family History:   Family History   Problem Relation Age of Onset   • Pulmonary fibrosis Biological Mother    • Heart disease Biological Mother    • Transient ischemic attack Biological Father    • Stroke Biological Father    • Heart disease Biological Father    • Heart disease Biological Brother    • Diabetes Biological Brother    • Hyperlipidemia Biological Brother    • Hyperlipidemia Biological Brother    • Hypertension Biological Brother    • Pancreatic cancer Biological Brother    • No Known Problems Biological Brother        Allergies:   Sulfa (sulfonamide antibiotics), Epinephrine, Phenylephrine, Pseudoephedrine, and Triprolidine    Current Medications:      Current Outpatient Medications:   •  ascorbic acid, vitamin C, 500 mg tablet,chewable, Take 1 tablet by mouth daily.  , Disp: , Rfl:   •  cholecalciferol (VITAMIN D3) 50,000 unit tablet, Take 3,000 Units by mouth daily., Disp: , Rfl:   •  glucosamine sulfate 500 mg tablet, Take 1 tablet by mouth daily.  , Disp: , Rfl:   •  multivitamin with minerals tablet, Take 1 tablet by mouth daily.  , Disp: , Rfl:   •  rosuvastatin (CRESTOR) 5 mg tablet, Take 5 mg by mouth daily., Disp: , Rfl:   •  vitB6/mag cit,ox/potassium cit (THERALITH XR ORAL), Take 2 tablets by mouth 2 (two) times a day.  , Disp: , Rfl:   •  zoledronic acid/mannitol-water (RECLAST IV), Infuse into a venous catheter., Disp: , Rfl:   •   calc-D3-magnes-I4-Qt-Ds-park 250 mg-400 unit -40 mg-5 mg tablet, Take 1 tablet by mouth daily.  , Disp: , Rfl:     Review of Systems  All 14 systems reviewed and the findings are not pertinent to the current problem.    Objective     Vital Signs  There were no vitals filed for this visit.  Body mass index is 38.09 kg/m².      Physical Exam  General Appearance:  Well developed.  Well nourished.  In no acute distress.    Anorectal Examination:    No pilonidal sinus was observed.   No pilonidal cyst was observed.   Perianal skin was not erythematous.  No perianal wart was observed.  No anal fissure was observed.   Anus did not have a fistula.   Anal sphincter tone was normal.   No hemorrhoids were seen.   No external anal skin tags were observed.   Anus had no mass.  Rectum:  No rectal abscess was observed.   Rectal prolapse was not observed.   No rectal fistula was observed.   Rectal exam was not tender.   No rectal fluctuance was observed.  Rectal exam showed no mass.   Normal preet-anal skin with no lesions or dermatitis    Min hems  No mass          Problem List Items Addressed This Visit     Internal hemorrhoid, bleeding     She has very low grade rectal bleeding and on exam has minimal hemorrhoids and no mass.  I am again content to observe her but I suggested she get another colonoscopy before 2027.                Pan Mcclellan MD 2/27/2024 11:29 AM

## 2024-02-27 NOTE — ASSESSMENT & PLAN NOTE
She has very low grade rectal bleeding and on exam has minimal hemorrhoids and no mass.  I am again content to observe her but I suggested she get another colonoscopy before 2027.

## 2024-05-10 ENCOUNTER — APPOINTMENT (OUTPATIENT)
Dept: LAB | Age: 74
End: 2024-05-10
Attending: INTERNAL MEDICINE
Payer: MEDICARE

## 2024-05-10 ENCOUNTER — TRANSCRIBE ORDERS (OUTPATIENT)
Dept: LAB | Age: 74
End: 2024-05-10

## 2024-05-10 DIAGNOSIS — E55.9 VITAMIN D DEFICIENCY, UNSPECIFIED: Primary | ICD-10-CM

## 2024-05-10 DIAGNOSIS — E78.00 PURE HYPERCHOLESTEROLEMIA, UNSPECIFIED: ICD-10-CM

## 2024-05-10 DIAGNOSIS — E55.9 VITAMIN D DEFICIENCY, UNSPECIFIED: ICD-10-CM

## 2024-05-10 LAB
25(OH)D3 SERPL-MCNC: 44 NG/ML (ref 30–100)
ALT SERPL-CCNC: 19 IU/L (ref 7–52)
AST SERPL-CCNC: 20 IU/L (ref 13–39)
CHOLEST SERPL-MCNC: 137 MG/DL
HDLC SERPL-MCNC: 56 MG/DL
HDLC SERPL: 2.4 {RATIO}
LDLC SERPL CALC-MCNC: 66 MG/DL
NONHDLC SERPL-MCNC: 81 MG/DL
TRIGL SERPL-MCNC: 77 MG/DL

## 2024-05-10 PROCEDURE — 84450 TRANSFERASE (AST) (SGOT): CPT

## 2024-05-10 PROCEDURE — 82306 VITAMIN D 25 HYDROXY: CPT

## 2024-05-10 PROCEDURE — 80061 LIPID PANEL: CPT

## 2024-05-10 PROCEDURE — 84460 ALANINE AMINO (ALT) (SGPT): CPT

## 2024-05-10 PROCEDURE — 36415 COLL VENOUS BLD VENIPUNCTURE: CPT

## 2024-08-08 ENCOUNTER — TRANSCRIBE ORDERS (OUTPATIENT)
Dept: RHEUMATOLOGY | Facility: HOSPITAL | Age: 74
End: 2024-08-08

## 2024-08-08 DIAGNOSIS — M81.0 AGE-RELATED OSTEOPOROSIS WITHOUT CURRENT PATHOLOGICAL FRACTURE: Primary | ICD-10-CM

## 2024-08-20 ENCOUNTER — TRANSCRIBE ORDERS (OUTPATIENT)
Dept: SCHEDULING | Age: 74
End: 2024-08-20

## 2024-08-20 DIAGNOSIS — N20.0 CALCULUS OF KIDNEY: Primary | ICD-10-CM

## 2024-09-09 ENCOUNTER — HOSPITAL ENCOUNTER (OUTPATIENT)
Dept: RADIOLOGY | Age: 74
Discharge: HOME | End: 2024-09-09
Attending: UROLOGY
Payer: MEDICARE

## 2024-09-09 DIAGNOSIS — N20.0 CALCULUS OF KIDNEY: ICD-10-CM

## 2024-09-09 PROCEDURE — 74176 CT ABD & PELVIS W/O CONTRAST: CPT

## 2024-09-16 ENCOUNTER — HOSPITAL ENCOUNTER (OUTPATIENT)
Dept: RADIOLOGY | Age: 74
Discharge: HOME | End: 2024-09-16
Attending: INTERNAL MEDICINE
Payer: MEDICARE

## 2024-09-16 DIAGNOSIS — M81.0 AGE-RELATED OSTEOPOROSIS WITHOUT CURRENT PATHOLOGICAL FRACTURE: ICD-10-CM

## 2024-09-16 PROCEDURE — 77080 DXA BONE DENSITY AXIAL: CPT

## 2024-10-04 ENCOUNTER — HOSPITAL ENCOUNTER (OUTPATIENT)
Dept: RADIOLOGY | Age: 74
Discharge: HOME | End: 2024-10-04
Attending: INTERNAL MEDICINE
Payer: MEDICARE

## 2024-10-04 ENCOUNTER — TRANSCRIBE ORDERS (OUTPATIENT)
Dept: RADIOLOGY | Age: 74
End: 2024-10-04

## 2024-10-04 DIAGNOSIS — R05.9 COUGH, UNSPECIFIED: ICD-10-CM

## 2024-10-04 DIAGNOSIS — R05.9 COUGH, UNSPECIFIED: Primary | ICD-10-CM

## 2024-10-04 PROCEDURE — 71046 X-RAY EXAM CHEST 2 VIEWS: CPT

## 2024-11-07 ENCOUNTER — TRANSCRIBE ORDERS (OUTPATIENT)
Dept: SCHEDULING | Age: 74
End: 2024-11-07

## 2024-11-07 DIAGNOSIS — Z12.31 ENCOUNTER FOR SCREENING MAMMOGRAM FOR MALIGNANT NEOPLASM OF BREAST: Primary | ICD-10-CM

## 2024-11-08 ENCOUNTER — HOSPITAL ENCOUNTER (OUTPATIENT)
Dept: RADIOLOGY | Age: 74
Discharge: HOME | End: 2024-11-08
Attending: OBSTETRICS & GYNECOLOGY
Payer: MEDICARE

## 2024-11-08 DIAGNOSIS — Z12.31 ENCOUNTER FOR SCREENING MAMMOGRAM FOR MALIGNANT NEOPLASM OF BREAST: ICD-10-CM

## 2024-11-08 PROCEDURE — 77063 BREAST TOMOSYNTHESIS BI: CPT

## 2025-01-31 ENCOUNTER — APPOINTMENT (OUTPATIENT)
Dept: RADIOLOGY | Age: 75
End: 2025-01-31
Attending: FAMILY MEDICINE
Payer: MEDICARE

## 2025-01-31 ENCOUNTER — HOSPITAL ENCOUNTER (OUTPATIENT)
Facility: CLINIC | Age: 75
Discharge: HOME | End: 2025-01-31
Attending: FAMILY MEDICINE
Payer: MEDICARE

## 2025-01-31 VITALS
SYSTOLIC BLOOD PRESSURE: 110 MMHG | HEART RATE: 74 BPM | WEIGHT: 211 LBS | DIASTOLIC BLOOD PRESSURE: 72 MMHG | TEMPERATURE: 98.2 F | HEIGHT: 63 IN | BODY MASS INDEX: 37.39 KG/M2 | OXYGEN SATURATION: 96 %

## 2025-01-31 DIAGNOSIS — S60.221A CONTUSION OF RIGHT HAND, INITIAL ENCOUNTER: Primary | ICD-10-CM

## 2025-01-31 PROCEDURE — 99202 OFFICE O/P NEW SF 15 MIN: CPT | Performed by: FAMILY MEDICINE

## 2025-01-31 PROCEDURE — 73130 X-RAY EXAM OF HAND: CPT | Mod: RT | Performed by: FAMILY MEDICINE

## 2025-01-31 NOTE — ED PROVIDER NOTES
History  No chief complaint on file.    Pt here with complaint of fall yesterday.  She reports that she landed on dorsal and lateral hand which is the part that hurts.  She has swelling over this area.          Past Medical History:   Diagnosis Date    Arthritis     balbina. knees    Bladder prolapse, female, acquired     hx of    Bronchitis     hx of    Concussion     hx of    IBS (irritable bowel syndrome)     hx of    Kidney stones     hx of    Lipid disorder     Murmur, cardiac     hx of    Osteoporosis     Sleep apnea        Past Surgical History   Procedure Laterality Date    D&C, HYSTEROSCOPY DIAGNOSTIC N/A 2/15/2022    Performed by Adryan Leyva MD at [facility] OR    Hernia repair  2000    umbilical    Joint replacement      Kidney surgery      stones    Knee arthroscopy Bilateral     Oophorectomy Bilateral     Skin biopsy         Family History   Problem Relation Name Age of Onset    Pulmonary fibrosis Biological Mother      Heart disease Biological Mother      Transient ischemic attack Biological Father      Stroke Biological Father      Heart disease Biological Father      Heart disease Biological Brother      Diabetes Biological Brother      Hyperlipidemia Biological Brother      Hyperlipidemia Biological Brother      Hypertension Biological Brother      Pancreatic cancer Biological Brother      No Known Problems Biological Brother         Social History     Tobacco Use    Smoking status: Never    Smokeless tobacco: Never   Vaping Use    Vaping status: Never Used   Substance Use Topics    Alcohol use: Yes     Comment: rare    Drug use: Never       Review of Systems    Physical Exam  ED Triage Vitals   Temp Pulse Resp BP SpO2   -- -- -- -- --      Temp src Heart Rate Source Patient Position BP Location FiO2 (%) (Set)   -- -- -- -- --       Physical Exam  Constitutional:       Appearance: Normal appearance.   Cardiovascular:      Rate and Rhythm: Normal rate and regular rhythm.      Pulses: Normal pulses.       Heart sounds: Normal heart sounds.   Pulmonary:      Effort: Pulmonary effort is normal.      Breath sounds: Normal breath sounds.   Musculoskeletal:      Comments: Swelling and tender on dorsum overlying the 4th and 5th metacarpals   Neurological:      Mental Status: She is alert.           Procedures  Procedures    UC Course       Medical Decision Making  Negative film, simple contusion, ice and nsaid  Follow up in 1 week if not resolving                     Edison Darnell, DO  01/31/25 1057

## 2025-02-21 ENCOUNTER — TRANSCRIBE ORDERS (OUTPATIENT)
Dept: SCHEDULING | Age: 75
End: 2025-02-21

## 2025-02-21 DIAGNOSIS — M24.841: Primary | ICD-10-CM

## 2025-04-17 ENCOUNTER — TRANSCRIBE ORDERS (OUTPATIENT)
Dept: LAB | Age: 75
End: 2025-04-17

## 2025-04-17 ENCOUNTER — APPOINTMENT (OUTPATIENT)
Dept: LAB | Age: 75
End: 2025-04-17
Attending: INTERNAL MEDICINE
Payer: MEDICARE

## 2025-04-17 DIAGNOSIS — R73.09 ELEVATED GLUCOSE: ICD-10-CM

## 2025-04-17 DIAGNOSIS — E78.00 HYPERCHOLESTEROLEMIA: Primary | ICD-10-CM

## 2025-04-17 DIAGNOSIS — E78.00 HYPERCHOLESTEROLEMIA: ICD-10-CM

## 2025-04-17 LAB
ALT SERPL-CCNC: 20 IU/L (ref 7–52)
ANION GAP SERPL CALC-SCNC: 7 MEQ/L (ref 3–15)
AST SERPL-CCNC: 23 IU/L (ref 13–39)
BUN SERPL-MCNC: 16 MG/DL (ref 7–25)
CALCIUM SERPL-MCNC: 9.6 MG/DL (ref 8.6–10.3)
CHLORIDE SERPL-SCNC: 103 MEQ/L (ref 98–107)
CHOLEST SERPL-MCNC: 132 MG/DL
CO2 SERPL-SCNC: 31 MEQ/L (ref 21–31)
CREAT SERPL-MCNC: 0.8 MG/DL (ref 0.6–1.2)
EGFRCR SERPLBLD CKD-EPI 2021: >60 ML/MIN/1.73M*2
EST. AVERAGE GLUCOSE BLD GHB EST-MCNC: 111 MG/DL
GLUCOSE SERPL-MCNC: 97 MG/DL (ref 70–99)
HBA1C MFR BLD: 5.5 %
HDLC SERPL-MCNC: 61 MG/DL
HDLC SERPL: 2.2 {RATIO}
LDLC SERPL CALC-MCNC: 58 MG/DL
NONHDLC SERPL-MCNC: 71 MG/DL
POTASSIUM SERPL-SCNC: 4.3 MEQ/L (ref 3.5–5.1)
SODIUM SERPL-SCNC: 141 MEQ/L (ref 136–145)
TRIGL SERPL-MCNC: 63 MG/DL

## 2025-04-17 PROCEDURE — 84450 TRANSFERASE (AST) (SGOT): CPT

## 2025-04-17 PROCEDURE — 84460 ALANINE AMINO (ALT) (SGPT): CPT

## 2025-04-17 PROCEDURE — 36415 COLL VENOUS BLD VENIPUNCTURE: CPT

## 2025-04-17 PROCEDURE — 83036 HEMOGLOBIN GLYCOSYLATED A1C: CPT

## 2025-04-17 PROCEDURE — 80048 BASIC METABOLIC PNL TOTAL CA: CPT

## 2025-04-17 PROCEDURE — 80061 LIPID PANEL: CPT

## 2025-08-27 ENCOUNTER — HOSPITAL ENCOUNTER (OUTPATIENT)
Dept: RADIOLOGY | Age: 75
Discharge: HOME | End: 2025-08-27
Attending: UROLOGY
Payer: MEDICARE

## 2025-08-27 DIAGNOSIS — N20.0 URIC ACID NEPHROLITHIASIS: ICD-10-CM

## 2025-08-27 PROCEDURE — 76775 US EXAM ABDO BACK WALL LIM: CPT

## (undated) DEVICE — ***USE 57698*** SLEEVE FLOWTRON DVT CALF SINGLE USE

## (undated) DEVICE — DRESSING ABD STER LGE 8X10

## (undated) DEVICE — GOWN SURG X-LARGE MICROCOOL

## (undated) DEVICE — GLOVE SZ 7 LINER PROTEXIS PI BL

## (undated) DEVICE — SOLN IRRIG .9%SOD 3L

## (undated) DEVICE — Device

## (undated) DEVICE — SOLN IRRIG STER WATER 1000ML

## (undated) DEVICE — TUBING CYSTO BLADDER IRRIG

## (undated) DEVICE — SPONGE RAYTEC 8X4 12 PLY

## (undated) DEVICE — GOWN SURGICAL REINFORCED X-LAR

## (undated) DEVICE — GLOVE PROTEXIS PI ORTHO 7.0